# Patient Record
Sex: FEMALE | Race: WHITE | NOT HISPANIC OR LATINO | Employment: PART TIME | URBAN - METROPOLITAN AREA
[De-identification: names, ages, dates, MRNs, and addresses within clinical notes are randomized per-mention and may not be internally consistent; named-entity substitution may affect disease eponyms.]

---
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
## 2020-11-09 ENCOUNTER — HOSPITAL ENCOUNTER (EMERGENCY)
Facility: CLINIC | Age: 20
Discharge: HOME | End: 2020-11-09
Attending: EMERGENCY MEDICINE | Admitting: EMERGENCY MEDICINE
Payer: COMMERCIAL

## 2020-11-09 VITALS
TEMPERATURE: 98.9 F | OXYGEN SATURATION: 97 % | HEART RATE: 106 BPM | DIASTOLIC BLOOD PRESSURE: 85 MMHG | RESPIRATION RATE: 16 BRPM | BODY MASS INDEX: 39.64 KG/M2 | SYSTOLIC BLOOD PRESSURE: 124 MMHG | WEIGHT: 255 LBS

## 2020-11-09 DIAGNOSIS — S81.011A LACERATION OF RIGHT KNEE, INITIAL ENCOUNTER: ICD-10-CM

## 2020-11-09 DIAGNOSIS — Z23 REQUIRES A BOOSTER TETANUS: ICD-10-CM

## 2020-11-09 PROCEDURE — 90471 IMMUNIZATION ADMIN: CPT | Performed by: EMERGENCY MEDICINE

## 2020-11-09 PROCEDURE — 99282 EMERGENCY DEPT VISIT SF MDM: CPT | Mod: 25 | Performed by: EMERGENCY MEDICINE

## 2020-11-09 PROCEDURE — 99282 EMERGENCY DEPT VISIT SF MDM: CPT | Performed by: EMERGENCY MEDICINE

## 2020-11-09 PROCEDURE — 250N000011 HC RX IP 250 OP 636: Performed by: EMERGENCY MEDICINE

## 2020-11-09 PROCEDURE — 90715 TDAP VACCINE 7 YRS/> IM: CPT | Performed by: EMERGENCY MEDICINE

## 2020-11-09 RX ADMIN — CLOSTRIDIUM TETANI TOXOID ANTIGEN (FORMALDEHYDE INACTIVATED), CORYNEBACTERIUM DIPHTHERIAE TOXOID ANTIGEN (FORMALDEHYDE INACTIVATED), BORDETELLA PERTUSSIS TOXOID ANTIGEN (GLUTARALDEHYDE INACTIVATED), BORDETELLA PERTUSSIS FILAMENTOUS HEMAGGLUTININ ANTIGEN (FORMALDEHYDE INACTIVATED), BORDETELLA PERTUSSIS PERTACTIN ANTIGEN, AND BORDETELLA PERTUSSIS FIMBRIAE 2/3 ANTIGEN 0.5 ML: 5; 2; 2.5; 5; 3; 5 INJECTION, SUSPENSION INTRAMUSCULAR at 02:02

## 2020-11-09 ASSESSMENT — ENCOUNTER SYMPTOMS
WEAKNESS: 0
NUMBNESS: 0
COUGH: 0
FEVER: 0
NERVOUS/ANXIOUS: 1
WOUND: 1
SHORTNESS OF BREATH: 0

## 2020-11-09 NOTE — ED PROVIDER NOTES
History     Chief Complaint   Patient presents with     Laceration     fell on rocks- lac to right knee     HPI  Samantha Cody is a 19 year old female with history of obesity presenting for evaluation of injury to her right knee.  Patient reports she is slipped on some rocks for 3 hours before ED arrival landing on her knee.  She reports severe heavy bleeding from the wound.  Bleeding initially did stop but returned again so she came in for evaluation because she was not certain what to do next.  No significant pain at the time of the ED visit.  Patient uncertain when her last tetanus shot was.  Otherwise feeling well recently.    ==================================================================    CHART REVIEW:      Td/Tdap  12/13/2007         END CHART REVIEW  ==================================================================      Allergies:  No Known Allergies    Problem List:    Patient Active Problem List    Diagnosis Date Noted     Thyroid function test abnormal 02/18/2015     Priority: Medium     Multiple joint pain 02/18/2015     Priority: Medium        Past Medical History:    History reviewed. No pertinent past medical history.    Past Surgical History:    History reviewed. No pertinent surgical history.    Family History:    Family History   Problem Relation Age of Onset     Thyroid Disease Paternal Grandmother      Gynecology Other         ovarian cancer age:25 -paternal aunt       Social History:  Marital Status:  Single [1]  Social History     Tobacco Use     Smoking status: Never Smoker     Smokeless tobacco: Never Used   Substance Use Topics     Alcohol use: Not Currently     Drug use: No        Medications:         doxycycline monohydrate (MONODOX) 100 MG capsule          Review of Systems   Constitutional: Negative for fever.   Respiratory: Negative for cough and shortness of breath.    Cardiovascular: Negative for chest pain.   Musculoskeletal:        Mild pain over laceration of right knee    Skin: Positive for wound (Laceration right knee).   Neurological: Negative for weakness and numbness.   Psychiatric/Behavioral: The patient is nervous/anxious.    All other systems reviewed and are negative.      Physical Exam   BP: 119/74  Temp: 98.9  F (37.2  C)  Resp: 16  Weight: 115.7 kg (255 lb)  SpO2: 99 %      Physical Exam  Vitals signs and nursing note reviewed.   Constitutional:       Appearance: She is obese. She is not ill-appearing or diaphoretic.   Cardiovascular:      Rate and Rhythm: Normal rate.   Pulmonary:      Effort: Pulmonary effort is normal.   Musculoskeletal:      Right knee: She exhibits laceration (1 cm laceration just below the right knee overlying the tibial tuberosity). She exhibits normal range of motion and no swelling.   Skin:     General: Skin is warm and dry.      Capillary Refill: Capillary refill takes less than 2 seconds.   Neurological:      Mental Status: She is alert and oriented to person, place, and time.   Psychiatric:      Comments: anxious           Right knee      Right knee    ED [facility]     -Laceration Repair    Date/Time: 11/9/2020 2:04 AM  Performed by: Thomas Jim MD  Authorized by: Thomas Jim MD       ANESTHESIA (see MAR for exact dosages):     Anesthesia method:  None  LACERATION DETAILS     Location:  Leg    Leg location:  R knee    Length (cm):  1    Depth (mm):  2    REPAIR TYPE:     Repair type:  Simple        TREATMENT:     Amount of cleaning:  Standard    Irrigation solution:  Tap water    SKIN REPAIR     Repair method:  Tissue adhesive and Steri-Strips    Number of Steri-Strips:  3    APPROXIMATION     Approximation:  Close    POST-PROCEDURE DETAILS     Dressing:  Open (no dressing)      PROCEDURE   Patient Tolerance:  Patient tolerated the procedure well with no immediate complications                       No results found for this or any previous visit (from the past 24  hour(s)).    Medications   Tdap (tetanus-diphtheria-acell pertussis) (ADACEL) injection 0.5 mL (has no administration in time range)       Assessments & Plan (with Medical Decision Making)  19-year-old female presented for evaluation of injury to her right knee.  Fell directly onto the knee and had trouble controlling bleeding so came in for evaluation.  Bleeding was stopped in the ED and wound cleaned and closed with Steri-Strips and tissue adhesive.  Tetanus updated.     I have reviewed the nursing notes.    I have reviewed the findings, diagnosis, plan and need for follow up with the patient.    New Prescriptions    No medications on file       Final diagnoses:   Laceration of right knee, initial encounter   Requires a booster tetanus - given today       11/9/2020   [facility] EMERGENCY DEPT     Jim, Thomas Quesada MD  11/09/20 [id number]

## 2020-11-09 NOTE — ED AVS SNAPSHOT
[facility] Emergency Dept  [address] [address]  Phone: [phone]  Fax: [phone]                                    Samantha Cody   MRN: [id number]    Department: [facility] Emergency Dept   Date of Visit: 11/9/2020           After Visit Summary Signature Page    I have received my discharge instructions, and my questions have been answered. I have discussed any challenges I see with this plan with the nurse or doctor.    ..........................................................................................................................................  Patient/Patient Representative Signature      ..........................................................................................................................................  Patient Representative Print Name and Relationship to Patient    ..................................................               ................................................  Date                                   Time    ..........................................................................................................................................  Reviewed by Signature/Title    ...................................................              ..............................................  Date                                               Time          22EPIC Rev 08/18

## 2021-11-02 ENCOUNTER — OFFICE VISIT (OUTPATIENT)
Dept: FAMILY MEDICINE | Facility: CLINIC | Age: 21
End: 2021-11-02
Payer: COMMERCIAL

## 2021-11-02 VITALS
BODY MASS INDEX: 43.47 KG/M2 | WEIGHT: 277 LBS | DIASTOLIC BLOOD PRESSURE: 80 MMHG | SYSTOLIC BLOOD PRESSURE: 126 MMHG | TEMPERATURE: 98.3 F | HEIGHT: 67 IN | HEART RATE: 80 BPM

## 2021-11-02 DIAGNOSIS — Z32.00 POSSIBLE PREGNANCY, NOT YET CONFIRMED: Primary | ICD-10-CM

## 2021-11-02 LAB
HCG SERPL QL: NEGATIVE
HCG UR QL: NEGATIVE

## 2021-11-02 PROCEDURE — 36415 COLL VENOUS BLD VENIPUNCTURE: CPT | Performed by: NURSE PRACTITIONER

## 2021-11-02 PROCEDURE — 99213 OFFICE O/P EST LOW 20 MIN: CPT | Performed by: NURSE PRACTITIONER

## 2021-11-02 PROCEDURE — 81025 URINE PREGNANCY TEST: CPT | Performed by: NURSE PRACTITIONER

## 2021-11-02 PROCEDURE — 84703 CHORIONIC GONADOTROPIN ASSAY: CPT | Performed by: NURSE PRACTITIONER

## 2021-11-02 ASSESSMENT — MIFFLIN-ST. JEOR: SCORE: 2063.05

## 2021-11-02 NOTE — PROGRESS NOTES
"  Assessment & Plan     (Z32.00) Possible pregnancy, not yet confirmed  (primary encounter diagnosis)  Comment: negative pregnancy test   Plan: HCG Qual, Urine (VWD0183), HCG qualitative,         Blood ([id number])           BMI:   Estimated body mass index is 43.06 kg/m  as calculated from the following:    Height as of this encounter: 1.708 m (5' 7.25\").    Weight as of this encounter: 125.6 kg (277 lb).   Weight management plan: Discussed healthy diet and exercise guidelines    See Patient Instructions    No follow-ups on file.    Chantale Marquez, APRN CNP  [facility]    Gemma Singh is a 20 year old who presents for the following health issues     HPI     Patient is here for a pregnancy confirmation. She had one positive pregnancy test at home. LMP 10/14/21. She states that her last period was very light.        Review of Systems   Constitutional, HEENT, cardiovascular, pulmonary, gi and gu systems are negative, except as otherwise noted.      Objective    /80 (BP Location: Right arm, Cuff Size: Adult Large)   Pulse 80   Temp 98.3  F (36.8  C) (Tympanic)   Ht 1.708 m (5' 7.25\")   Wt 125.6 kg (277 lb)   LMP 10/14/2021   BMI 43.06 kg/m    There is no height or weight on file to calculate BMI.  Physical Exam   GENERAL: healthy, alert and no distress  EYES: Eyes grossly normal to inspection, PERRL and conjunctivae and sclerae normal  HENT: ear canals and TM's normal, nose and mouth without ulcers or lesions  RESP: lungs clear to auscultation - no rales, rhonchi or wheezes  CV: regular rate and rhythm, normal S1 S2, no S3 or S4, no murmur, click or rub, no peripheral edema and peripheral pulses strong  ABDOMEN: soft, nontender, no hepatosplenomegaly, no masses and bowel sounds normal  MS: no gross musculoskeletal defects noted, no edema  SKIN: no suspicious lesions or rashes  NEURO: Normal strength and tone, mentation intact and speech normal  PSYCH: mentation appears normal, " affect normal/bright    Results for orders placed or performed in visit on 11/02/21   HCG Qual, Urine ([id number])     Status: Normal   Result Value Ref Range    hCG Urine Qualitative Negative Negative   HCG qualitative, Blood ([id number])     Status: Normal   Result Value Ref Range    hCG Serum Qualitative Negative Negative

## 2021-11-02 NOTE — RESULT ENCOUNTER NOTE
Please Notify Samantha  of test results pregnancy serum test was negative    Chantale Marquez CNP

## 2022-06-09 ENCOUNTER — OFFICE VISIT (OUTPATIENT)
Dept: FAMILY MEDICINE | Facility: CLINIC | Age: 22
End: 2022-06-09
Payer: COMMERCIAL

## 2022-06-09 VITALS
SYSTOLIC BLOOD PRESSURE: 128 MMHG | DIASTOLIC BLOOD PRESSURE: 89 MMHG | BODY MASS INDEX: 44.41 KG/M2 | HEART RATE: 98 BPM | OXYGEN SATURATION: 97 % | HEIGHT: 68 IN | WEIGHT: 293 LBS | TEMPERATURE: 97.4 F | RESPIRATION RATE: 16 BRPM

## 2022-06-09 DIAGNOSIS — Z86.39 HISTORY OF HYPOTHYROIDISM: ICD-10-CM

## 2022-06-09 DIAGNOSIS — E03.9 ACQUIRED HYPOTHYROIDISM: Primary | ICD-10-CM

## 2022-06-09 DIAGNOSIS — Z11.4 SCREENING FOR HIV (HUMAN IMMUNODEFICIENCY VIRUS): ICD-10-CM

## 2022-06-09 DIAGNOSIS — Z00.01 ENCOUNTER FOR ROUTINE ADULT PHYSICAL EXAM WITH ABNORMAL FINDINGS: Primary | ICD-10-CM

## 2022-06-09 DIAGNOSIS — Z12.4 CERVICAL CANCER SCREENING: ICD-10-CM

## 2022-06-09 DIAGNOSIS — Z13.220 SCREENING CHOLESTEROL LEVEL: ICD-10-CM

## 2022-06-09 DIAGNOSIS — E66.01 CLASS 3 SEVERE OBESITY DUE TO EXCESS CALORIES WITHOUT SERIOUS COMORBIDITY WITH BODY MASS INDEX (BMI) OF 40.0 TO 44.9 IN ADULT (H): ICD-10-CM

## 2022-06-09 DIAGNOSIS — Z23 NEED FOR HPV VACCINE: ICD-10-CM

## 2022-06-09 DIAGNOSIS — E66.813 CLASS 3 SEVERE OBESITY DUE TO EXCESS CALORIES WITHOUT SERIOUS COMORBIDITY WITH BODY MASS INDEX (BMI) OF 40.0 TO 44.9 IN ADULT (H): ICD-10-CM

## 2022-06-09 DIAGNOSIS — N63.20 LEFT BREAST MASS: ICD-10-CM

## 2022-06-09 DIAGNOSIS — Z11.59 NEED FOR HEPATITIS C SCREENING TEST: ICD-10-CM

## 2022-06-09 LAB
ANION GAP SERPL CALCULATED.3IONS-SCNC: 4 MMOL/L (ref 3–14)
BASOPHILS # BLD AUTO: 0 10E3/UL (ref 0–0.2)
BASOPHILS NFR BLD AUTO: 0 %
BUN SERPL-MCNC: 11 MG/DL (ref 7–30)
CALCIUM SERPL-MCNC: 8.7 MG/DL (ref 8.5–10.1)
CHLORIDE BLD-SCNC: 105 MMOL/L (ref 94–109)
CHOLEST SERPL-MCNC: 177 MG/DL
CO2 SERPL-SCNC: 28 MMOL/L (ref 20–32)
CREAT SERPL-MCNC: 0.79 MG/DL (ref 0.52–1.04)
EOSINOPHIL # BLD AUTO: 0.1 10E3/UL (ref 0–0.7)
EOSINOPHIL NFR BLD AUTO: 3 %
ERYTHROCYTE [DISTWIDTH] IN BLOOD BY AUTOMATED COUNT: 15.8 % (ref 10–15)
FASTING STATUS PATIENT QL REPORTED: YES
GFR SERPL CREATININE-BSD FRML MDRD: >90 ML/MIN/1.73M2
GLUCOSE BLD-MCNC: 111 MG/DL (ref 70–99)
HCT VFR BLD AUTO: 36.1 % (ref 35–47)
HDLC SERPL-MCNC: 42 MG/DL
HGB BLD-MCNC: 11.3 G/DL (ref 11.7–15.7)
IMM GRANULOCYTES # BLD: 0 10E3/UL
IMM GRANULOCYTES NFR BLD: 0 %
LDLC SERPL CALC-MCNC: 94 MG/DL
LYMPHOCYTES # BLD AUTO: 1.7 10E3/UL (ref 0.8–5.3)
LYMPHOCYTES NFR BLD AUTO: 33 %
MCH RBC QN AUTO: 25.2 PG (ref 26.5–33)
MCHC RBC AUTO-ENTMCNC: 31.3 G/DL (ref 31.5–36.5)
MCV RBC AUTO: 81 FL (ref 78–100)
MONOCYTES # BLD AUTO: 0.4 10E3/UL (ref 0–1.3)
MONOCYTES NFR BLD AUTO: 7 %
NEUTROPHILS # BLD AUTO: 2.9 10E3/UL (ref 1.6–8.3)
NEUTROPHILS NFR BLD AUTO: 56 %
NONHDLC SERPL-MCNC: 135 MG/DL
PLATELET # BLD AUTO: 200 10E3/UL (ref 150–450)
POTASSIUM BLD-SCNC: 3.7 MMOL/L (ref 3.4–5.3)
RBC # BLD AUTO: 4.48 10E6/UL (ref 3.8–5.2)
SODIUM SERPL-SCNC: 137 MMOL/L (ref 133–144)
T4 FREE SERPL-MCNC: 0.93 NG/DL (ref 0.76–1.46)
TRIGL SERPL-MCNC: 203 MG/DL
TSH SERPL DL<=0.005 MIU/L-ACNC: 9.55 MU/L (ref 0.4–4)
WBC # BLD AUTO: 5.1 10E3/UL (ref 4–11)

## 2022-06-09 PROCEDURE — 80048 BASIC METABOLIC PNL TOTAL CA: CPT | Performed by: NURSE PRACTITIONER

## 2022-06-09 PROCEDURE — 80061 LIPID PANEL: CPT | Performed by: NURSE PRACTITIONER

## 2022-06-09 PROCEDURE — 99214 OFFICE O/P EST MOD 30 MIN: CPT | Mod: 25 | Performed by: NURSE PRACTITIONER

## 2022-06-09 PROCEDURE — 84443 ASSAY THYROID STIM HORMONE: CPT | Performed by: NURSE PRACTITIONER

## 2022-06-09 PROCEDURE — 36415 COLL VENOUS BLD VENIPUNCTURE: CPT | Performed by: NURSE PRACTITIONER

## 2022-06-09 PROCEDURE — 85025 COMPLETE CBC W/AUTO DIFF WBC: CPT | Performed by: NURSE PRACTITIONER

## 2022-06-09 PROCEDURE — 84439 ASSAY OF FREE THYROXINE: CPT | Performed by: NURSE PRACTITIONER

## 2022-06-09 PROCEDURE — 90471 IMMUNIZATION ADMIN: CPT | Performed by: NURSE PRACTITIONER

## 2022-06-09 PROCEDURE — 99395 PREV VISIT EST AGE 18-39: CPT | Mod: 25 | Performed by: NURSE PRACTITIONER

## 2022-06-09 PROCEDURE — 90651 9VHPV VACCINE 2/3 DOSE IM: CPT | Performed by: NURSE PRACTITIONER

## 2022-06-09 RX ORDER — LEVOTHYROXINE SODIUM 50 UG/1
50 TABLET ORAL DAILY
Qty: 90 TABLET | Refills: 0 | Status: SHIPPED | OUTPATIENT
Start: 2022-06-09 | End: 2022-08-25

## 2022-06-09 ASSESSMENT — ENCOUNTER SYMPTOMS
HEMATURIA: 0
CHILLS: 0
DIZZINESS: 1
SORE THROAT: 0
EYE PAIN: 0
FREQUENCY: 0
PARESTHESIAS: 0
COUGH: 0
DIARRHEA: 0
DYSURIA: 0
HEADACHES: 1
MYALGIAS: 0
ABDOMINAL PAIN: 0
PALPITATIONS: 0
FEVER: 0
JOINT SWELLING: 0
NERVOUS/ANXIOUS: 0
ARTHRALGIAS: 0
WEAKNESS: 0
HEARTBURN: 0
HEMATOCHEZIA: 0
BREAST MASS: 1
CONSTIPATION: 0
SHORTNESS OF BREATH: 1
NAUSEA: 1

## 2022-06-09 ASSESSMENT — PAIN SCALES - GENERAL: PAINLEVEL: NO PAIN (0)

## 2022-06-09 NOTE — PROGRESS NOTES
"ph   SUBJECTIVE:   CC: Samantha Cody is an 21 year old woman who presents for preventive health visit.       Patient has been advised of split billing requirements and indicates understanding: Yes  Healthy Habits:     Getting at least 3 servings of Calcium per day:  NO    Bi-annual eye exam:  NO    Dental care twice a year:  NO    Sleep apnea or symptoms of sleep apnea:  None    Diet:  Breakfast skipped    Frequency of exercise:  None    Taking medications regularly:  Yes    Medication side effects:  None    PHQ-2 Total Score: 0    Additional concerns today:  No      Left breast lump x 3 months - BF noticed 12 oclock 1 inch from nipple  Patient's last menstrual period was 06/06/2022.      HPV vaccine today  On period first Pap declined today will come back for same.   Same partner for > 1 yr  No vaginal symptoms.   Declined hep c and HIV screening    Estimated body mass index is 44.85 kg/m  as calculated from the following:    Height as of this encounter: 1.727 m (5' 8\").    Weight as of this encounter: 133.8 kg (295 lb).      Today's PHQ-2 Score:   PHQ-2 ( 1999 [employer]) 6/9/2022   Q1: Little interest or pleasure in doing things 0   Q2: Feeling down, depressed or hopeless 0   PHQ-2 Score 0   Q1: Little interest or pleasure in doing things Not at all   Q2: Feeling down, depressed or hopeless Not at all   PHQ-2 Score 0       Abuse: Current or Past (Physical, Sexual or Emotional) - No  Do you feel safe in your environment? Yes    Have you ever done Advance Care Planning? (For example, a Health Directive, POLST, or a discussion with a medical provider or your loved ones about your wishes): No, advance care planning information given to patient to review.  Advanced care planning was discussed at today's visit.    Social History     Tobacco Use     Smoking status: Never Smoker     Smokeless tobacco: Never Used   Substance Use Topics     Alcohol use: Not Currently         Alcohol Use 6/9/2022   Prescreen: >3 drinks/day " or >7 drinks/week? Yes   AUDIT SCORE  10   Patient's last menstrual period was 06/06/2022.      Reviewed orders with patient.  Reviewed health maintenance and updated orders accordingly - Yes  BP Readings from Last 3 Encounters:   06/09/22 128/89   11/02/21 126/80   11/09/20 124/85    Wt Readings from Last 3 Encounters:   06/09/22 133.8 kg (295 lb)   11/02/21 125.6 kg (277 lb)   11/09/20 115.7 kg (255 lb) (>99 %, Z= 2.56)*     * Growth percentiles are based on [facility] (Girls, 2-20 Years) data.                  Patient Active Problem List   Diagnosis     Thyroid function test abnormal     Multiple joint pain     Morbid obesity (H)     History reviewed. No pertinent surgical history.    Social History     Tobacco Use     Smoking status: Never Smoker     Smokeless tobacco: Never Used   Substance Use Topics     Alcohol use: Not Currently     Family History   Problem Relation Age of Onset     Thyroid Disease Paternal Grandmother      Gynecology Other         ovarian cancer age:25 -paternal aunt         No current outpatient medications on file.     No Known Allergies    Breast Cancer Screening:        History of abnormal Pap smear: NA never had done. Needs pap. If normal pap - age 21-29 PAP every 3 years recommended     Reviewed and updated as needed this visit by clinical staff   Tobacco  Allergies  Meds   Med Hx  Surg Hx  Fam Hx  Soc Hx          Reviewed and updated as needed this visit by Provider                   History reviewed. No pertinent past medical history.   History reviewed. No pertinent surgical history.  OB History   No obstetric history on file.       Review of Systems   Constitutional: Negative for chills and fever.   HENT: Positive for ear pain. Negative for congestion, hearing loss and sore throat.    Eyes: Negative for pain and visual disturbance.   Respiratory: Positive for shortness of breath. Negative for cough.    Cardiovascular: Negative for chest pain, palpitations and peripheral edema.  "  Gastrointestinal: Positive for nausea. Negative for abdominal pain, constipation, diarrhea, heartburn and hematochezia.   Breasts:  Positive for tenderness and breast mass. Negative for discharge.   Genitourinary: Positive for vaginal discharge. Negative for dysuria, frequency, genital sores, hematuria, pelvic pain, urgency and vaginal bleeding.   Musculoskeletal: Negative for arthralgias, joint swelling and myalgias.   Skin: Negative for rash.   Neurological: Positive for dizziness and headaches. Negative for weakness and paresthesias.   Psychiatric/Behavioral: Negative for mood changes. The patient is not nervous/anxious.           OBJECTIVE:   /89 (BP Location: Right arm)   Pulse 98   Temp 97.4  F (36.3  C) (Tympanic)   Resp 16   Ht 1.727 m (5' 8\")   Wt 133.8 kg (295 lb)   LMP 06/06/2022   SpO2 97%   BMI 44.85 kg/m    Physical Exam  GENERAL: healthy, alert and no distress  EYES: Eyes grossly normal to inspection, PERRL and conjunctivae and sclerae normal  HENT: ear canals and TM's normal, nose and mouth without ulcers or lesions  NECK: no adenopathy, no asymmetry, masses, or scars and thyroid normal to palpation  RESP: lungs clear to auscultation - no rales, rhonchi or wheezes  BREAST: pea size lump left breast 12 oclock non tender. Breast otherwise unremarkable.  CV: regular rate and rhythm, normal S1 S2, no S3 or S4, no murmur, click or rub, no peripheral edema and peripheral pulses strong  ABDOMEN: soft, nontender, no hepatosplenomegaly, no masses and bowel sounds normal  MS: no gross musculoskeletal defects noted, no edema  SKIN: no suspicious lesions or rashes  NEURO: Normal strength and tone, mentation intact and speech normal  PSYCH: mentation appears normal, affect normal/bright  LYMPH: no cervical, supraclavicular, axillary, or inguinal adenopathy    Diagnostic Test Results:  Labs reviewed in Epic  Results for orders placed or performed in visit on 06/09/22   Basic metabolic panel  (Ca, " Cl, CO2, Creat, Gluc, K, Na, BUN)     Status: None (Preliminary result)   Result Value Ref Range    Sodium 137 133 - 144 mmol/L    Potassium 3.7 3.4 - 5.3 mmol/L    Chloride 105 94 - 109 mmol/L    Carbon Dioxide (CO2)      Anion Gap      Urea Nitrogen      Creatinine      Calcium      Glucose      GFR Estimate     CBC with platelets and differential     Status: Abnormal   Result Value Ref Range    WBC Count 5.1 4.0 - 11.0 10e3/uL    RBC Count 4.48 3.80 - 5.20 10e6/uL    Hemoglobin 11.3 (L) 11.7 - 15.7 g/dL    Hematocrit 36.1 35.0 - 47.0 %    MCV 81 78 - 100 fL    MCH 25.2 (L) 26.5 - 33.0 pg    MCHC 31.3 (L) 31.5 - 36.5 g/dL    RDW 15.8 (H) 10.0 - 15.0 %    Platelet Count 200 150 - 450 10e3/uL    % Neutrophils 56 %    % Lymphocytes 33 %    % Monocytes 7 %    % Eosinophils 3 %    % Basophils 0 %    % Immature Granulocytes 0 %    Absolute Neutrophils 2.9 1.6 - 8.3 10e3/uL    Absolute Lymphocytes 1.7 0.8 - 5.3 10e3/uL    Absolute Monocytes 0.4 0.0 - 1.3 10e3/uL    Absolute Eosinophils 0.1 0.0 - 0.7 10e3/uL    Absolute Basophils 0.0 0.0 - 0.2 10e3/uL    Absolute Immature Granulocytes 0.0 <=0.4 10e3/uL   CBC with platelets and differential     Status: Abnormal    Narrative    The following orders were created for panel order CBC with platelets and differential.  Procedure                               Abnormality         Status                     ---------                               -----------         ------                     CBC with platelets and d...[[id number]]  Abnormal            Final result                 Please view results for these tests on the individual orders.       ASSESSMENT/PLAN:   Samantha was seen today for physical.    Diagnoses and all orders for this visit:    Encounter for routine adult physical exam with abnormal findings  -     REVIEW OF HEALTH MAINTENANCE PROTOCOL ORDERS    Left breast mass  Diagnostic mammogram and ultrasound ordered    -     MA Diagnostic Digital Bilateral; Future    Class  "3 severe obesity due to excess calories without serious comorbidity with body mass index (BMI) of 40.0 to 44.9 in adult (H)  Increase physical activity.  Improve nutrition.  Labs done today for monitoring    -     CBC with platelets and differential  -     Basic metabolic panel  (Ca, Cl, CO2, Creat, Gluc, K, Na, BUN)    Cervical cancer screening  Declined today.  Will make follow-up appointment for first When not on her menstrual cycle    History of hypothyroidism  Previous history of hypothyroidism.  Previous use of medications levothyroxine.  Off meds for several years.  Significant weight gain.  Fatigue.  Constipation.  Dry skin  Repeat labs    -     TSH with free T4 reflex    Need for HPV vaccine  -     Human Papilloma Virus Vaccine (Gardasil 9) 3 Dose IM    Screening for HIV (human immunodeficiency virus)  Declined    Need for hepatitis C screening test  Declined    Screening cholesterol level    -     Lipid panel reflex to direct LDL Fasting        Patient has been advised of split billing requirements and indicates understanding: Yes    COUNSELING:  Reviewed preventive health counseling, as reflected in patient instructions    Estimated body mass index is 44.85 kg/m  as calculated from the following:    Height as of this encounter: 1.727 m (5' 8\").    Weight as of this encounter: 133.8 kg (295 lb).    Weight management plan: Discussed healthy diet and exercise guidelines    She reports that she has never smoked. She has never used smokeless tobacco.      Call or return to the clinic with any worsening of symptoms or no resolution. Patient/Parent verbalized understanding and is in agreement. Medication side effects reviewed.   No current outpatient medications on file.     Chart documentation with Dragon Voice recognition Software. Although reviewed after completion, some words and grammatical errors may remain.      RAFY [facility]  "

## 2022-07-07 ENCOUNTER — HOSPITAL ENCOUNTER (OUTPATIENT)
Dept: ULTRASOUND IMAGING | Facility: CLINIC | Age: 22
Discharge: HOME | End: 2022-07-07
Attending: NURSE PRACTITIONER
Payer: COMMERCIAL

## 2022-07-07 DIAGNOSIS — N63.20 LEFT BREAST MASS: ICD-10-CM

## 2022-07-07 PROCEDURE — 76642 ULTRASOUND BREAST LIMITED: CPT | Mod: LT

## 2022-07-12 DIAGNOSIS — N63.0 BREAST MASS IN FEMALE: Primary | ICD-10-CM

## 2022-07-13 ENCOUNTER — PATIENT OUTREACH (OUTPATIENT)
Dept: ONCOLOGY | Facility: CLINIC | Age: 22
End: 2022-07-13

## 2022-07-13 NOTE — PROGRESS NOTES
"Referral reviewed and sent to [employer]PS (new patient scheduling) for scheduling:  \"SCHEDULE - TRELL LEWIS at , 60 min in person, with imaging to follow.\"    "

## 2022-07-29 ENCOUNTER — TELEPHONE (OUTPATIENT)
Dept: FAMILY MEDICINE | Facility: CLINIC | Age: 22
End: 2022-07-29

## 2022-07-29 NOTE — TELEPHONE ENCOUNTER
Patient Quality Outreach    Patient is due for the following:   Cervical Cancer Screening - PAP Needed    NEXT STEPS:   Schedule a office visit for PAP    Type of outreach:    Sent [employer] message.      Questions for provider review:    None     Bailee Kahler

## 2022-07-29 NOTE — LETTER
July 29, 2022      Samantha Cody  [address]  [place] [address]      Your healthcare team cares about your health. To provide you with the best care, we have reviewed your chart and based on our findings, we see that you are due to:     - CERVICAL CANCER SCREENING: Schedule a Cervical Cancer Screening, with Pap and wellness exam.     If you have already completed these items, please contact the clinic via phone or Mychart so your care team can review and update your records.  Thank you for choosing [facility] for your healthcare needs. For any questions, concerns, or to schedule an appointment please contact the clinic.       Healthy Regards,      Your [facility] Care Team

## 2022-08-11 ENCOUNTER — PRENATAL OFFICE VISIT (OUTPATIENT)
Dept: OBGYN | Facility: CLINIC | Age: 22
End: 2022-08-11
Payer: COMMERCIAL

## 2022-08-11 DIAGNOSIS — Z34.00 PRENATAL CARE, FIRST PREGNANCY: ICD-10-CM

## 2022-08-11 PROCEDURE — 99207 PR NO CHARGE NURSE ONLY: CPT | Performed by: OBSTETRICS & GYNECOLOGY

## 2022-08-11 RX ORDER — PRENATAL VIT/IRON FUM/FOLIC AC 27MG-0.8MG
1 TABLET ORAL DAILY
COMMUNITY
Start: 2022-08-03 | End: 2023-04-12

## 2022-08-11 NOTE — PROGRESS NOTES
Lifestyle and nutrition teaching completed   Marleny Thompson OB Intake Nurse    Patient supplied answers from flow sheet for:  Prenatal OB Questionnaire.

## 2022-08-24 LAB
ABO/RH(D): NORMAL
ANTIBODY SCREEN: NEGATIVE
SPECIMEN EXPIRATION DATE: NORMAL

## 2022-08-25 ENCOUNTER — PRENATAL OFFICE VISIT (OUTPATIENT)
Dept: OBGYN | Facility: CLINIC | Age: 22
End: 2022-08-25
Payer: COMMERCIAL

## 2022-08-25 VITALS
RESPIRATION RATE: 18 BRPM | TEMPERATURE: 98.9 F | SYSTOLIC BLOOD PRESSURE: 134 MMHG | WEIGHT: 293 LBS | HEIGHT: 67 IN | BODY MASS INDEX: 45.99 KG/M2 | HEART RATE: 85 BPM | DIASTOLIC BLOOD PRESSURE: 90 MMHG

## 2022-08-25 DIAGNOSIS — Z34.01 ENCOUNTER FOR PRENATAL CARE IN FIRST TRIMESTER OF FIRST PREGNANCY: Primary | ICD-10-CM

## 2022-08-25 DIAGNOSIS — E03.9 ACQUIRED HYPOTHYROIDISM: ICD-10-CM

## 2022-08-25 LAB
ALBUMIN UR-MCNC: NEGATIVE MG/DL
APPEARANCE UR: CLEAR
BACTERIA #/AREA URNS HPF: ABNORMAL /HPF
BILIRUB UR QL STRIP: NEGATIVE
COLOR UR AUTO: YELLOW
ERYTHROCYTE [DISTWIDTH] IN BLOOD BY AUTOMATED COUNT: 15.1 % (ref 10–15)
GLUCOSE 1H P 50 G GLC PO SERPL-MCNC: 92 MG/DL (ref 70–129)
GLUCOSE UR STRIP-MCNC: NEGATIVE MG/DL
HCT VFR BLD AUTO: 37.4 % (ref 35–47)
HGB BLD-MCNC: 11.6 G/DL (ref 11.7–15.7)
HGB UR QL STRIP: NEGATIVE
KETONES UR STRIP-MCNC: NEGATIVE MG/DL
LEUKOCYTE ESTERASE UR QL STRIP: ABNORMAL
MCH RBC QN AUTO: 25.3 PG (ref 26.5–33)
MCHC RBC AUTO-ENTMCNC: 31 G/DL (ref 31.5–36.5)
MCV RBC AUTO: 82 FL (ref 78–100)
NITRATE UR QL: NEGATIVE
PH UR STRIP: 6 [PH] (ref 5–7)
PLATELET # BLD AUTO: 228 10E3/UL (ref 150–450)
RBC # BLD AUTO: 4.58 10E6/UL (ref 3.8–5.2)
RBC #/AREA URNS AUTO: ABNORMAL /HPF
SP GR UR STRIP: <=1.005 (ref 1–1.03)
SQUAMOUS #/AREA URNS AUTO: ABNORMAL /LPF
TSH SERPL DL<=0.005 MIU/L-ACNC: 3.58 MU/L (ref 0.4–4)
UROBILINOGEN UR STRIP-ACNC: 0.2 E.U./DL
WBC # BLD AUTO: 5.8 10E3/UL (ref 4–11)
WBC #/AREA URNS AUTO: ABNORMAL /HPF

## 2022-08-25 PROCEDURE — 85027 COMPLETE CBC AUTOMATED: CPT | Performed by: OBSTETRICS & GYNECOLOGY

## 2022-08-25 PROCEDURE — 86800 THYROGLOBULIN ANTIBODY: CPT | Performed by: NURSE PRACTITIONER

## 2022-08-25 PROCEDURE — 86762 RUBELLA ANTIBODY: CPT | Performed by: OBSTETRICS & GYNECOLOGY

## 2022-08-25 PROCEDURE — 86803 HEPATITIS C AB TEST: CPT | Performed by: OBSTETRICS & GYNECOLOGY

## 2022-08-25 PROCEDURE — 86901 BLOOD TYPING SEROLOGIC RH(D): CPT | Performed by: OBSTETRICS & GYNECOLOGY

## 2022-08-25 PROCEDURE — 84443 ASSAY THYROID STIM HORMONE: CPT | Performed by: OBSTETRICS & GYNECOLOGY

## 2022-08-25 PROCEDURE — 86780 TREPONEMA PALLIDUM: CPT | Performed by: OBSTETRICS & GYNECOLOGY

## 2022-08-25 PROCEDURE — 82950 GLUCOSE TEST: CPT | Performed by: OBSTETRICS & GYNECOLOGY

## 2022-08-25 PROCEDURE — 36415 COLL VENOUS BLD VENIPUNCTURE: CPT | Performed by: NURSE PRACTITIONER

## 2022-08-25 PROCEDURE — 87340 HEPATITIS B SURFACE AG IA: CPT | Performed by: OBSTETRICS & GYNECOLOGY

## 2022-08-25 PROCEDURE — 99207 PR FIRST OB VISIT: CPT | Performed by: OBSTETRICS & GYNECOLOGY

## 2022-08-25 PROCEDURE — 81001 URINALYSIS AUTO W/SCOPE: CPT | Performed by: OBSTETRICS & GYNECOLOGY

## 2022-08-25 PROCEDURE — 87086 URINE CULTURE/COLONY COUNT: CPT | Performed by: OBSTETRICS & GYNECOLOGY

## 2022-08-25 PROCEDURE — 87591 N.GONORRHOEAE DNA AMP PROB: CPT | Performed by: OBSTETRICS & GYNECOLOGY

## 2022-08-25 PROCEDURE — G0145 SCR C/V CYTO,THINLAYER,RESCR: HCPCS | Performed by: OBSTETRICS & GYNECOLOGY

## 2022-08-25 PROCEDURE — 87389 HIV-1 AG W/HIV-1&-2 AB AG IA: CPT | Performed by: OBSTETRICS & GYNECOLOGY

## 2022-08-25 PROCEDURE — 86900 BLOOD TYPING SEROLOGIC ABO: CPT | Performed by: OBSTETRICS & GYNECOLOGY

## 2022-08-25 PROCEDURE — 86850 RBC ANTIBODY SCREEN: CPT | Performed by: OBSTETRICS & GYNECOLOGY

## 2022-08-25 PROCEDURE — 87491 CHLMYD TRACH DNA AMP PROBE: CPT | Performed by: OBSTETRICS & GYNECOLOGY

## 2022-08-25 PROCEDURE — 86376 MICROSOMAL ANTIBODY EACH: CPT | Performed by: NURSE PRACTITIONER

## 2022-08-25 RX ORDER — LEVOTHYROXINE SODIUM 75 UG/1
75 TABLET ORAL DAILY
Qty: 60 TABLET | Refills: 1 | Status: SHIPPED | OUTPATIENT
Start: 2022-08-25 | End: 2023-01-03

## 2022-08-25 NOTE — PROGRESS NOTES
"[facility]    New OB Visit Note    CC: New OB     Subjective:    Samantha is a 21 year old  at 11w3d by LMP of 2022.  She presents for her initial OB visit. This is a unplanned pregnancy and her and her partner Prashant  are excited. She reports feeling well. Denies any uterine cramping, abdominal pain or vaginal bleeding. Denies nausea and vomiting.       Pt reports that she feels safe at home and her mood is good.   Concerns: none    Past OB History: none       OB History    Para Term  AB Living   1 0 0 0 0 0   SAB IAB Ectopic Multiple Live Births   0 0 0 0 0      # Outcome Date GA Lbr Joshua/2nd Weight Sex Delivery Anes PTL Lv   1 Current                Past Medical History:   Diagnosis Date     Hypothyroidism        Past Surgical History:   Procedure Laterality Date     NO HISTORY OF SURGERY         Current Outpatient Medications   Medication Sig Dispense Refill     levothyroxine (SYNTHROID/LEVOTHROID) 50 MCG tablet Take 1 tablet (50 mcg) by mouth daily 90 tablet 0     Prenatal Vit-Fe Fumarate-FA (PRENATAL MULTIVITAMIN W/IRON) 27-0.8 MG tablet Take 1 tablet by mouth daily         Family History   Problem Relation Age of Onset     Hypertension Mother      Hypertension Father      Thyroid Disease Paternal Grandmother      Gynecology Other         ovarian cancer age:25 -paternal aunt       Social History     Tobacco Use     Smoking status: Current Every Day Smoker     Types: Vaping Device     Smokeless tobacco: Never Used     Tobacco comment: cutting down with pregnancy   Substance Use Topics     Alcohol use: Not Currently       ROS: A 10 pt ROS was completed and found to be negative unless mentioned in the HPI.     Objective:    BP (!) 134/90 (BP Location: Right arm, Patient Position: Chair, Cuff Size: Adult Large)   Pulse 85   Temp 98.9  F (37.2  C) (Tympanic)   Resp 18   Ht 1.702 m (5' 7\")   Wt 134.3 kg (296 lb)   LMP 2022   BMI 46.36 kg/m      Estimated " "body mass index is 46.36 kg/m  as calculated from the following:    Height as of this encounter: 1.702 m (5' 7\").    Weight as of this encounter: 134.3 kg (296 lb).    General appearance: well-hydrated, A&O x 3, no apparent distress  Lungs: Equal expansion bilaterally, no accessory muscle use  Heart: No heaves or thrills. No peripheral varicosities  Constitutional: See vitals  Abdomen: Soft, non-tender, non-distended. No rebound, rigidity, or guarding.  Extremities: no edema  Genitourinary:  External genitalia: no erythema, no lesions.   Urethral meatus appropriate location without lesions or prolapse  Urethra: No masses, tenderness, or scarring  Bladder no fullness, masses, or tenderness.  Anus and Perineum: Unremarkable, no visible lesions  Vagina: Normal, healthy pink mucosa without any lesions. Physiologic vaginal discharge.    Cervix: normal appearance, no cervical motion tenderness.   Uterus: gravid  Adnexa: no masses or tenderness bilaterally.    Bedside Ultrasound    Transvaginal ultrasound was performed. A viable intrauterine pregnancy was seen.      CRL= 1.17 cm   FHT= 148 bpm  EGA= 7 weeks 2 days  EDC based on US = 23  EDC by LMP= 3/13/23  FINAL EDC= 23            Assessment and Plan:     Encounter Diagnosis   Name Primary?     Encounter for prenatal care in first trimester of first pregnancy Yes       21 year old  at 7 weeks 2 days by ultrasound today who presents for her initial OB visit.     1) Plan to draw new OB lab today   2) Discussed routine prenatal care, group practice model at [facility], tertiary support and frequency of visits. Also discussed options for genetic screening tests. Patient desires NIPT for genetic testing.   3) Dating/viability US performed today.  4) Concerns: HTN and obesity  5) PMH/OBHx problems: hypothyroid - TSH drawn today and will check q trimester.  Recommend TSH around 1 during pregnancy.  6) Medication review: no changes, continue prenatal vitamin "   7) Reviewed miscarriage precautions (present to ED or call clinic with abdominal pain, vaginal bleeding or fever)   8) Weight gain: discussed weight gain expectations (BMI <18.5: 28-40lbs, BMI 18.5-25: 25-35lbs, BMI 25-30: 15-25lbs, BMI >30: 11-20lbs)   9) PAP smear: done otday  10) Delivery plan: continue to discuss route of delivery, breastfeeding, pp contraception, pain management in labor  11) Immunizations: Tdap at 28wks   12) POS increased risk for gestational diabetes, plan for screen at 28wks (The ADA and ACOG define patients at increased risk of type 2 diabetes based on: body mass index (BMI) ?25 kg/m2 (?23 kg/m2 in  Americans) plus one or more of the following [2,28]:   GDM in a previous pregnancy.   Glycated hemoglobin ?5.7 percent (39 mmol/mol), impaired glucose tolerance, or impaired fasting glucose on previous testing.   First-degree relative with diabetes.   High-risk race/ethnicity (eg, , , ,  American, ).   History of cardiovascular disease.   Hypertension (?140/90 mmHg) or on therapy for hypertension.   High-density lipoprotein cholesterol level <35 mg/dL (0.90 mmol/L) and/or a triglyceride level >250 mg/dL (2.82 mmol/L).   Polycystic ovary syndrome (PCOS).   Physical inactivity.   Other clinical condition associated with insulin resistance (eg, severe obesity, acanthosis nigricans).  -In addition, we would include older age and use age ?35 years as the threshold.  13) POS risk for preeclampsia.  Will start baby ASA between 12-16 weeks and stop between 36-delivery.(The incidence of preeclampsia is estimated to be at least 8 percent for pregnant women with any one of these high risk factors:  ?Previous pregnancy with preeclampsia, especially early onset and with an adverse outcome.  ?Type 1 or 2 diabetes mellitus.  ?Chronic hypertension.  ?Multifetal gestation.  ?Kidney disease.  ?Autoimmune disease with potential vascular  complications (antiphospholipid syndrome, systemic lupus erythematosus).     or two or more of the following moderate risk factors [22]:  ?Nulliparity.  ?Obesity (body mass index >30 kg/m2).  ?Family history of preeclampsia in mother or sister.  ?Age ?35 years.  ?Sociodemographic characteristics (Black persons, lower income level [recognizing that these are not biological factors]).  ?Personal risk factors (eg, previous pregnancy with low birth weight or small for gestational age infant, previous adverse pregnancy outcome [eg, stillbirth], interval >10 years between pregnancies).  ?In vitro conception    Return to clinic in 4 weeks or prn.

## 2022-08-26 LAB
BACTERIA UR CULT: NORMAL
C TRACH DNA SPEC QL PROBE+SIG AMP: NEGATIVE
HBV SURFACE AG SERPL QL IA: NONREACTIVE
HCV AB SERPL QL IA: NONREACTIVE
HIV 1+2 AB+HIV1 P24 AG SERPL QL IA: NONREACTIVE
N GONORRHOEA DNA SPEC QL NAA+PROBE: NEGATIVE
RUBV IGG SERPL QL IA: 3.26 INDEX
RUBV IGG SERPL QL IA: POSITIVE
T PALLIDUM AB SER QL: NONREACTIVE
THYROGLOB AB SERPL IA-ACNC: <20 IU/ML
THYROPEROXIDASE AB SERPL-ACNC: <10 IU/ML

## 2022-08-29 LAB
BKR LAB AP GYN ADEQUACY: NORMAL
BKR LAB AP GYN INTERPRETATION: NORMAL
BKR LAB AP HPV REFLEX: NO
BKR LAB AP PREVIOUS ABNORMAL: NORMAL
PATH REPORT.COMMENTS IMP SPEC: NORMAL
PATH REPORT.COMMENTS IMP SPEC: NORMAL
PATH REPORT.RELEVANT HX SPEC: NORMAL

## 2022-08-29 NOTE — RESULT ENCOUNTER NOTE
Robinson Singh,  I am reviewing test reports for Jane Julien today who is out of the office.     There were 2 tests to check for thyroid antibodies in your system that can cause low thyroid.  This is from a condition called Hashimoto's thyroiditis.  You did not have either of these antibodies at this time.  ELOISA POLLACK MD

## 2022-09-22 ENCOUNTER — PRENATAL OFFICE VISIT (OUTPATIENT)
Dept: OBGYN | Facility: CLINIC | Age: 22
End: 2022-09-22
Payer: COMMERCIAL

## 2022-09-22 VITALS
SYSTOLIC BLOOD PRESSURE: 127 MMHG | TEMPERATURE: 97.5 F | WEIGHT: 284 LBS | DIASTOLIC BLOOD PRESSURE: 80 MMHG | BODY MASS INDEX: 44.48 KG/M2 | HEART RATE: 93 BPM

## 2022-09-22 DIAGNOSIS — Z34.01 ENCOUNTER FOR PRENATAL CARE IN FIRST TRIMESTER OF FIRST PREGNANCY: Primary | ICD-10-CM

## 2022-09-22 PROCEDURE — 99207 PR PRENATAL VISIT: CPT | Performed by: OBSTETRICS & GYNECOLOGY

## 2022-09-22 PROCEDURE — 36415 COLL VENOUS BLD VENIPUNCTURE: CPT | Performed by: OBSTETRICS & GYNECOLOGY

## 2022-09-22 NOTE — NURSING NOTE
"Initial /80 (BP Location: Right arm, Patient Position: Chair, Cuff Size: Adult Large)   Pulse 93   Temp 97.5  F (36.4  C) (Tympanic)   Wt 128.8 kg (284 lb)   LMP 06/06/2022   Breastfeeding No   BMI 44.48 kg/m   Estimated body mass index is 44.48 kg/m  as calculated from the following:    Height as of 8/25/22: 1.702 m (5' 7\").    Weight as of this encounter: 128.8 kg (284 lb). .    Naima Gastelum MA      NIPT with Gender!  Naima Gastelum MA    "

## 2022-09-22 NOTE — PROGRESS NOTES
"[facility]   OB/GYN Clinic    CC: Return OB     Subjective:    Samantha is a 21 year old  at 11w2d who presents for return OB visit. She reports feeling well. Denies uterine cramping, vaginal bleeding or leaking, dysuria.     Objective:  /80 (BP Location: Right arm, Patient Position: Chair, Cuff Size: Adult Large)   Pulse 93   Temp 97.5  F (36.4  C) (Tympanic)   Wt 128.8 kg (284 lb)   LMP 2022   Breastfeeding No   BMI 44.48 kg/m      Estimated body mass index is 44.48 kg/m  as calculated from the following:    Height as of 22: 1.702 m (5' 7\").    Weight as of this encounter: 128.8 kg (284 lb).    Physical Exam:  Gen: Pleasant, talkative female in no apparent distress   Respiratory: breathing comfortably on room air   Cardiac: Warm and well-perfused.   GI: Abd soft and non-tender, gravid  MSK: Grossly normal movement of all four extremities  Psych: mood and affect bright   Lower extremity: edema not present     Fetal dop tones: 150s bpm    Assessment/Plan:   21 year old  at 11w2d who presents for follow-up OB visit.   1) New OB lab; T&S, CBC, HIV, RPR, HepBsAg, Hep B antibody, rubella, GC/Chlam WNL . Plan for 3rd tri lab at 28wks.   2) Genetics testing: NIPT today  3) anatomy scan at 20w  4 PMH/OBHx problems:    ?cHTN- elevated at new OB, will cont to monitor, rec ASA   BMI 46- early GCT WNL   Hypothyroid, WNL at new OB, recheck q trimester  5) Medication review: no changes, continue prenatal vitamin   6) Immunizations: flu shot and covid- declinesTdap at 28wks    Return to clinic in 4 weeks.     Cortney Fonseca MD   2022 1:59 PM     "

## 2022-10-05 LAB — SCANNED LAB RESULT: NORMAL

## 2022-10-19 ENCOUNTER — PRENATAL OFFICE VISIT (OUTPATIENT)
Dept: OBGYN | Facility: CLINIC | Age: 22
End: 2022-10-19
Payer: COMMERCIAL

## 2022-10-19 ENCOUNTER — TRANSCRIBE ORDERS (OUTPATIENT)
Dept: MATERNAL FETAL MEDICINE | Facility: HOSPITAL | Age: 22
End: 2022-10-19

## 2022-10-19 VITALS
BODY MASS INDEX: 41.98 KG/M2 | DIASTOLIC BLOOD PRESSURE: 77 MMHG | RESPIRATION RATE: 18 BRPM | TEMPERATURE: 97.7 F | SYSTOLIC BLOOD PRESSURE: 119 MMHG | HEART RATE: 99 BPM | HEIGHT: 68 IN | WEIGHT: 277 LBS

## 2022-10-19 DIAGNOSIS — E03.9 HYPOTHYROIDISM, UNSPECIFIED TYPE: ICD-10-CM

## 2022-10-19 DIAGNOSIS — Z34.02 ENCOUNTER FOR PRENATAL CARE IN SECOND TRIMESTER OF FIRST PREGNANCY: Primary | ICD-10-CM

## 2022-10-19 DIAGNOSIS — O26.90 PREGNANCY RELATED CONDITION, ANTEPARTUM: Primary | ICD-10-CM

## 2022-10-19 DIAGNOSIS — E66.01 CLASS 3 SEVERE OBESITY WITH SERIOUS COMORBIDITY AND BODY MASS INDEX (BMI) OF 40.0 TO 44.9 IN ADULT, UNSPECIFIED OBESITY TYPE (H): ICD-10-CM

## 2022-10-19 DIAGNOSIS — O10.919 CHRONIC HYPERTENSION AFFECTING PREGNANCY: ICD-10-CM

## 2022-10-19 DIAGNOSIS — E66.813 CLASS 3 SEVERE OBESITY WITH SERIOUS COMORBIDITY AND BODY MASS INDEX (BMI) OF 40.0 TO 44.9 IN ADULT, UNSPECIFIED OBESITY TYPE (H): ICD-10-CM

## 2022-10-19 LAB
ALBUMIN MFR UR ELPH: 8.4 MG/DL
ALT SERPL W P-5'-P-CCNC: 15 U/L (ref 10–35)
AST SERPL W P-5'-P-CCNC: 16 U/L (ref 10–35)
CREAT SERPL-MCNC: 0.57 MG/DL (ref 0.51–0.95)
CREAT UR-MCNC: 104.3 MG/DL
ERYTHROCYTE [DISTWIDTH] IN BLOOD BY AUTOMATED COUNT: 15.4 % (ref 10–15)
GFR SERPL CREATININE-BSD FRML MDRD: >90 ML/MIN/1.73M2
HCT VFR BLD AUTO: 35.7 % (ref 35–47)
HGB BLD-MCNC: 11.5 G/DL (ref 11.7–15.7)
MCH RBC QN AUTO: 25.3 PG (ref 26.5–33)
MCHC RBC AUTO-ENTMCNC: 32.2 G/DL (ref 31.5–36.5)
MCV RBC AUTO: 79 FL (ref 78–100)
PLATELET # BLD AUTO: 181 10E3/UL (ref 150–450)
PROT/CREAT 24H UR: 0.08 MG/MG CR (ref 0–0.2)
RBC # BLD AUTO: 4.54 10E6/UL (ref 3.8–5.2)
TSH SERPL DL<=0.005 MIU/L-ACNC: 3.08 UIU/ML (ref 0.3–4.2)
WBC # BLD AUTO: 7.4 10E3/UL (ref 4–11)

## 2022-10-19 PROCEDURE — 85027 COMPLETE CBC AUTOMATED: CPT | Performed by: STUDENT IN AN ORGANIZED HEALTH CARE EDUCATION/TRAINING PROGRAM

## 2022-10-19 PROCEDURE — 36415 COLL VENOUS BLD VENIPUNCTURE: CPT | Performed by: STUDENT IN AN ORGANIZED HEALTH CARE EDUCATION/TRAINING PROGRAM

## 2022-10-19 PROCEDURE — 84443 ASSAY THYROID STIM HORMONE: CPT | Performed by: STUDENT IN AN ORGANIZED HEALTH CARE EDUCATION/TRAINING PROGRAM

## 2022-10-19 PROCEDURE — 84460 ALANINE AMINO (ALT) (SGPT): CPT | Performed by: STUDENT IN AN ORGANIZED HEALTH CARE EDUCATION/TRAINING PROGRAM

## 2022-10-19 PROCEDURE — 99207 PR PRENATAL VISIT: CPT | Performed by: STUDENT IN AN ORGANIZED HEALTH CARE EDUCATION/TRAINING PROGRAM

## 2022-10-19 PROCEDURE — 84450 TRANSFERASE (AST) (SGOT): CPT | Performed by: STUDENT IN AN ORGANIZED HEALTH CARE EDUCATION/TRAINING PROGRAM

## 2022-10-19 PROCEDURE — 84156 ASSAY OF PROTEIN URINE: CPT | Performed by: STUDENT IN AN ORGANIZED HEALTH CARE EDUCATION/TRAINING PROGRAM

## 2022-10-19 PROCEDURE — 82565 ASSAY OF CREATININE: CPT | Performed by: STUDENT IN AN ORGANIZED HEALTH CARE EDUCATION/TRAINING PROGRAM

## 2022-10-19 NOTE — PROGRESS NOTES
"[facility] Clinic  Return OB Note    Subjective:  Denied cramping abdominal pain or vaginal bleeding.  Has not noted fetal movement yet.  No complaints today.    Objective:  /77 (BP Location: Left arm, Patient Position: Chair, Cuff Size: Adult Large)   Pulse 99   Temp 97.7  F (36.5  C) (Tympanic)   Resp 18   Ht 1.727 m (5' 8\")   Wt 125.6 kg (277 lb)   LMP 2022   BMI 42.12 kg/m      FHT: 130bpm by bedside ultrasound    Assessment/Plan:   Samantha Cody is a 21 year old  at 15w1d by 7w2d ultrasound, here for return OB visit.    Routine prenatal care:  - New OB labs on 2022: O+, antibody negative; Hgb 11.6, plt 228, RPR/HIV/HepB/HepC NR, Rubella immune, chlamydia/gonorrhea neg; PAP smear NILM  - Dating US on 2022 at 7w2d  - Anatomy US is ordered today  - Genetic screen: NIPT -low risk  - Immunizations: Declined influenza and COVID-vaccine.      Chronic hypertension  -After reviewing EMR, patient was found to have blood pressures of 134/84 on 2017 and 134/98 on 2022.  Patient likely have chronic hypertension diagnosis.  -baseline preeclampsia labs are ordered for today: EKG, AST, ALT, creatinine, hemoglobin, platelet, UPC.  -Low dose aspirin for preeclampsia prevention, ideally started between 12-16 weeks  -[facility] referral placed for comprehensive (level II) anatomic ultrasound and ultrasounds for growth monthly after comprehensive ultrasound  - fetal surveillance with weekly BPP starting at 32 weeks     Pregravida BMI 43.84  -First trimester GCT passed.  -Patient will be getting growth ultrasounds and  surveillance for chronic hypertension.    Hypothyroidism  -On levothyroxine 75 mcg daily, which was started during this pregnancy.  -TSH on 2022 was normal.  Repeat TSH is ordered for today.  -Maintain euthyroidism via treatment with levothyroxine, aimed at keeping the TSH at the desired range by trimester.   TSH should be checked q4-6 " weeks during pregnancy and adjusted to trimester specific norms.      First trimester: 0.1 to 2.5 mIU/L    Second trimester: 0.2 to 3 mIU/L    Third trimester: 0.3 to 3 mIU/L    RTC 4 weeks    Erica Maldonado MD  Office phone: [phone]  Obstetrics and Gynecology  [facility]   10/19/2022

## 2022-10-19 NOTE — NURSING NOTE
"Initial /77 (BP Location: Left arm, Patient Position: Chair, Cuff Size: Adult Large)   Pulse 99   Temp 97.7  F (36.5  C) (Tympanic)   Resp 18   Ht 1.727 m (5' 8\")   Wt 125.6 kg (277 lb)   LMP 06/06/2022   BMI 42.12 kg/m   Estimated body mass index is 42.12 kg/m  as calculated from the following:    Height as of this encounter: 1.727 m (5' 8\").    Weight as of this encounter: 125.6 kg (277 lb). .      "

## 2022-11-02 ENCOUNTER — PRE VISIT (OUTPATIENT)
Dept: MATERNAL FETAL MEDICINE | Facility: HOSPITAL | Age: 22
End: 2022-11-02

## 2022-11-09 ENCOUNTER — ANCILLARY PROCEDURE (OUTPATIENT)
Dept: ULTRASOUND IMAGING | Facility: HOSPITAL | Age: 22
End: 2022-11-09
Attending: STUDENT IN AN ORGANIZED HEALTH CARE EDUCATION/TRAINING PROGRAM
Payer: COMMERCIAL

## 2022-11-09 ENCOUNTER — OFFICE VISIT (OUTPATIENT)
Dept: MATERNAL FETAL MEDICINE | Facility: HOSPITAL | Age: 22
End: 2022-11-09
Attending: STUDENT IN AN ORGANIZED HEALTH CARE EDUCATION/TRAINING PROGRAM
Payer: COMMERCIAL

## 2022-11-09 DIAGNOSIS — O99.280 HYPOTHYROID IN PREGNANCY, ANTEPARTUM: ICD-10-CM

## 2022-11-09 DIAGNOSIS — O10.912 MATERNAL CHRONIC HYPERTENSION IN SECOND TRIMESTER: ICD-10-CM

## 2022-11-09 DIAGNOSIS — O99.212 MATERNAL OBESITY SYNDROME IN SECOND TRIMESTER: Primary | ICD-10-CM

## 2022-11-09 DIAGNOSIS — O26.90 PREGNANCY RELATED CONDITION, ANTEPARTUM: ICD-10-CM

## 2022-11-09 DIAGNOSIS — E03.9 HYPOTHYROID IN PREGNANCY, ANTEPARTUM: ICD-10-CM

## 2022-11-09 PROCEDURE — 99203 OFFICE O/P NEW LOW 30 MIN: CPT | Mod: 25 | Performed by: OBSTETRICS & GYNECOLOGY

## 2022-11-09 PROCEDURE — 76811 OB US DETAILED SNGL FETUS: CPT

## 2022-11-09 PROCEDURE — 76811 OB US DETAILED SNGL FETUS: CPT | Mod: 26 | Performed by: OBSTETRICS & GYNECOLOGY

## 2022-11-09 NOTE — PROGRESS NOTES
Samantha presents to [facility] clinic for ultrasound and recommendations due to maternal chronic hypertension and BMI > 40. Pregnancy is also complicated by hypothyroidism. Labs and prenatal records were reviewed.    Impression:  1) Castillo intrauterine pregnancy at 18w 1d gestational age.   2) None of the anomalies commonly detected by ultrasound were evident in the detailed fetal anatomic survey, however some anatomy was seen suboptimally as outlined above.   3) Growth parameters and estimated fetal weight were consistent with established dates.  4) The amniotic fluid volume appeared normal.  5) Normal fetal activity for gestational age.  6) On transabdominal imaging the cervix appears long and closed.    Recommendations  Thank-you for referring your patient for a comprehensive ultrasound.  She had cell-free DNA screening showing the expected amounts of chromosomes 21, 18 & 13.    I discussed the findings on today's ultrasound with the patient. Samantha was diagnosed with chronic hypertension at the beginning of pregnancy. She is not on any antihypertensives, however she is on low dose aspirin which is appropriate. HELLP labs were reviewed and they are within normal limits. Antihypertensives are indicated to keep blood pressures <140 SBP or <90 DBP. Labetalol and nifedipine are first line. Currently, Samantha's blood pressures are well controlled on no medications.    Serial ultrasounds to assess fetal growth are recommended following completion of the detailed anatomic survey starting at 28 weeks. I anticipate these will be done in your office, but can be done here if you prefer. Weekly  testing is recommended to begin at 34 weeks (due to maternal BMI > 40) unless antihypertensives are started, at which point I would begin  testing at 32 weeks. Delivery is recommended at 38-39 weeks.    Follow-up is scheduled here in 4 weeks to reassess anatomy that was suboptimally seen today.     Return to primary  provider for continued prenatal care.    If you have questions regarding today's evaluation or if we can be of further service, please contact the [facility].    **Fetal anomalies may be present but not detected**

## 2022-11-11 ENCOUNTER — PRENATAL OFFICE VISIT (OUTPATIENT)
Dept: OBGYN | Facility: CLINIC | Age: 22
End: 2022-11-11
Payer: COMMERCIAL

## 2022-11-11 VITALS
DIASTOLIC BLOOD PRESSURE: 71 MMHG | BODY MASS INDEX: 41.81 KG/M2 | WEIGHT: 275 LBS | SYSTOLIC BLOOD PRESSURE: 130 MMHG | HEART RATE: 81 BPM | TEMPERATURE: 97.8 F

## 2022-11-11 DIAGNOSIS — Z34.92 PRENATAL CARE, SECOND TRIMESTER: Primary | ICD-10-CM

## 2022-11-11 PROCEDURE — 99207 PR PRENATAL VISIT: CPT | Performed by: OBSTETRICS & GYNECOLOGY

## 2022-11-11 RX ORDER — ASPIRIN 81 MG/1
81 TABLET ORAL DAILY
Status: ON HOLD | COMMUNITY
End: 2023-04-08

## 2022-11-11 NOTE — NURSING NOTE
"Initial /71 (BP Location: Right arm, Patient Position: Chair, Cuff Size: Adult Large)   Pulse 81   Temp 97.8  F (36.6  C) (Tympanic)   Wt 124.7 kg (275 lb)   LMP 06/06/2022   BMI 41.81 kg/m   Estimated body mass index is 41.81 kg/m  as calculated from the following:    Height as of 10/19/22: 1.727 m (5' 8\").    Weight as of this encounter: 124.7 kg (275 lb). .    Naima Gastelum MA    "

## 2022-11-11 NOTE — PROGRESS NOTES
[facility] OB/[facility]N [facility]  Return OB Note     Subjective:  Denied cramping abdominal pain or vaginal bleeding.  +FM. Has had some sciatic back pain.      Objective:/71 (BP Location: Right arm, Patient Position: Chair, Cuff Size: Adult Large)   Pulse 81   Temp 97.8  F (36.6  C) (Tympanic)   Wt 124.7 kg (275 lb)   LMP 2022   BMI 41.81 kg/m        FHT: 150s     Assessment/Plan:   Samantha Cody is a 21 year old  at 18w3d by 7w2d ultrasound, here for return OB visit.     Routine prenatal care:  - New OB labs on 2022: O+, antibody negative; Hgb 11.6, plt 228, RPR/HIV/HepB/HepC NR, Rubella immune, chlamydia/gonorrhea neg; PAP smear NILM  - Dating US on 2022 at 7w2d  - Anatomy US WNL, repeat for missing views pending  - Genetic screen: NIPT -low risk  - Immunizations: Declined influenza and COVID-vaccine.       ?Chronic hypertension  -After reviewing EMR, patient was found to have blood pressures of 134/84 on 2017 and 134/98 on 2022.  Patient likely have chronic hypertension diagnosis.  -baseline preeclampsia labs are ordered for today: EKG, AST, ALT, creatinine, hemoglobin, platelet, UPC.  -Low dose aspirin for preeclampsia prevention, ideally started between 12-16 weeks  -s/p level 2  -Likely  fetal surveillance with weekly BPP starting at 32 weeks      Pregravida BMI 43.84  -First trimester GCT passed.  -Patient will be getting growth ultrasounds and  surveillance for chronic hypertension.     Hypothyroidism  -On levothyroxine 75 mcg daily, which was started during this pregnancy.  -TSH on 2022 was normal.  Repeat TSH with third tri labs  -Maintain euthyroidism via treatment with levothyroxine, aimed at keeping the TSH at the desired range by trimester.   TSH should be checked q4-6 weeks during pregnancy and adjusted to trimester specific norms.      First trimester: 0.1 to 2.5 mIU/L    Second trimester: 0.2 to 3 mIU/L    Third trimester: 0.3  to 3 mIU/L     RTC 4 weeks    Cortney Fonseca MD   11/11/2022 11:10 AM

## 2022-11-20 ENCOUNTER — HEALTH MAINTENANCE LETTER (OUTPATIENT)
Age: 22
End: 2022-11-20

## 2022-11-29 ENCOUNTER — OFFICE VISIT (OUTPATIENT)
Dept: FAMILY MEDICINE | Facility: CLINIC | Age: 22
End: 2022-11-29
Payer: COMMERCIAL

## 2022-11-29 VITALS
HEART RATE: 114 BPM | DIASTOLIC BLOOD PRESSURE: 60 MMHG | TEMPERATURE: 99.8 F | RESPIRATION RATE: 12 BRPM | OXYGEN SATURATION: 97 % | SYSTOLIC BLOOD PRESSURE: 110 MMHG | WEIGHT: 272 LBS | BODY MASS INDEX: 41.36 KG/M2

## 2022-11-29 DIAGNOSIS — E03.9 HYPOTHYROIDISM, UNSPECIFIED TYPE: ICD-10-CM

## 2022-11-29 DIAGNOSIS — O10.919 CHRONIC HYPERTENSION AFFECTING PREGNANCY: Primary | ICD-10-CM

## 2022-11-29 LAB
ERYTHROCYTE [DISTWIDTH] IN BLOOD BY AUTOMATED COUNT: 15.8 % (ref 10–15)
HCT VFR BLD AUTO: 32.6 % (ref 35–47)
HGB BLD-MCNC: 10.5 G/DL (ref 11.7–15.7)
MCH RBC QN AUTO: 25.9 PG (ref 26.5–33)
MCHC RBC AUTO-ENTMCNC: 32.2 G/DL (ref 31.5–36.5)
MCV RBC AUTO: 81 FL (ref 78–100)
PLATELET # BLD AUTO: 148 10E3/UL (ref 150–450)
RBC # BLD AUTO: 4.05 10E6/UL (ref 3.8–5.2)
TSH SERPL DL<=0.005 MIU/L-ACNC: 0.74 UIU/ML (ref 0.3–4.2)
WBC # BLD AUTO: 4.2 10E3/UL (ref 4–11)

## 2022-11-29 PROCEDURE — 99214 OFFICE O/P EST MOD 30 MIN: CPT | Performed by: FAMILY MEDICINE

## 2022-11-29 PROCEDURE — 84443 ASSAY THYROID STIM HORMONE: CPT | Performed by: FAMILY MEDICINE

## 2022-11-29 PROCEDURE — 85027 COMPLETE CBC AUTOMATED: CPT | Performed by: FAMILY MEDICINE

## 2022-11-29 PROCEDURE — 93000 ELECTROCARDIOGRAM COMPLETE: CPT | Performed by: FAMILY MEDICINE

## 2022-11-29 PROCEDURE — 36415 COLL VENOUS BLD VENIPUNCTURE: CPT | Performed by: FAMILY MEDICINE

## 2022-11-29 PROCEDURE — 84439 ASSAY OF FREE THYROXINE: CPT | Performed by: FAMILY MEDICINE

## 2022-11-29 ASSESSMENT — PAIN SCALES - GENERAL: PAINLEVEL: NO PAIN (0)

## 2022-11-29 NOTE — PROGRESS NOTES
"  Assessment & Plan     Chronic hypertension affecting pregnancy  EKG is normal   BP is normal   Tachy heart rate   Will get labs and recheck this after hydration   She did come back to the clinic the following day and had a normal heartrate   - EKG 12-lead complete with read (future)  - CBC with platelets; Future  - CBC with platelets    Hypothyroidism, unspecified type  Adjust meds as indicated by above labs.   - TSH with free T4 reflex; Future  - TSH with free T4 reflex  - T4, free; Future  - T4, free             Nicotine/Tobacco Cessation:  She reports that she has been smoking vaping device. She has never used smokeless tobacco.  Nicotine/Tobacco Cessation Plan:   Information offered: Patient not interested at this time      BMI:   Estimated body mass index is 41.36 kg/m  as calculated from the following:    Height as of 10/19/22: 1.727 m (5' 8\").    Weight as of this encounter: 123.4 kg (272 lb).       Patient Instructions   Hydrate at home     Check thyroid and hemoglobin today     Will let you know lab results and next steps      Return in about 2 weeks (around 12/13/2022).    Ivana West MD  [facility]    Gemma Singh is a 21 year old, presenting for the following health issues:  Heart Problem (Per OB/Gyn)      History of Present Illness       Reason for visit:  Ekg    She eats 0-1 servings of fruits and vegetables daily.She consumes 1 sweetened beverage(s) daily.She exercises with enough effort to increase her heart rate 30 to 60 minutes per day.  She exercises with enough effort to increase her heart rate 3 or less days per week.   She is taking medications regularly.     Pt is 22w5d and here for EKG  Had elevated BP at OB visit and work up for htn in preg started   Has had normal labs and has felt well   She is not checking BP at home but since her first elevated pressure she has had normal readings    No headaches no CP no abd pain and no swelling   Eating " well   She has not however today had anything to eat or drink prior to this visit just woke up efore coming here             Review of Systems   Constitutional, HEENT, cardiovascular, pulmonary, gi and gu systems are negative, except as otherwise noted.      Objective    /60   Pulse 114   Temp 99.8  F (37.7  C) (Tympanic)   Resp 12   Wt 123.4 kg (272 lb)   LMP 06/06/2022   SpO2 97%   BMI 41.36 kg/m    Body mass index is 41.36 kg/m .  Physical Exam   GENERAL APPEARANCE: healthy, alert and no distress  NECK: no adenopathy, no asymmetry, masses, or scars and thyroid normal to palpation  RESP: lungs clear to auscultation - no rales, rhonchi or wheezes  CV: regular  rhythm, normal S1 S2, no S3 or S4 and no murmur, click or rub tachy at 100s  MS: extremities normal- no gross deformities noted  PSYCH: mentation appears normal and affect normal/bright    EKG - Reviewed and interpreted by me appears normal, NSR, normal axis, normal intervals, no acute ST/T changes c/w ischemia, no LVH by voltage criteria, unchanged from previous tracings  Tachy

## 2022-11-30 LAB — T4 FREE SERPL-MCNC: 1.08 NG/DL (ref 0.9–1.7)

## 2022-12-02 ENCOUNTER — ALLIED HEALTH/NURSE VISIT (OUTPATIENT)
Dept: FAMILY MEDICINE | Facility: CLINIC | Age: 22
End: 2022-12-02
Payer: COMMERCIAL

## 2022-12-02 VITALS — SYSTOLIC BLOOD PRESSURE: 112 MMHG | HEART RATE: 88 BPM | DIASTOLIC BLOOD PRESSURE: 80 MMHG

## 2022-12-02 DIAGNOSIS — Z34.01 ENCOUNTER FOR PRENATAL CARE IN FIRST TRIMESTER OF FIRST PREGNANCY: Primary | ICD-10-CM

## 2022-12-02 LAB
ERYTHROCYTE [DISTWIDTH] IN BLOOD BY AUTOMATED COUNT: 15.9 % (ref 10–15)
HCT VFR BLD AUTO: 34.3 % (ref 35–47)
HGB BLD-MCNC: 11.2 G/DL (ref 11.7–15.7)
MCH RBC QN AUTO: 26.1 PG (ref 26.5–33)
MCHC RBC AUTO-ENTMCNC: 32.7 G/DL (ref 31.5–36.5)
MCV RBC AUTO: 80 FL (ref 78–100)
PLATELET # BLD AUTO: 164 10E3/UL (ref 150–450)
RBC # BLD AUTO: 4.29 10E6/UL (ref 3.8–5.2)
WBC # BLD AUTO: 5.6 10E3/UL (ref 4–11)

## 2022-12-02 PROCEDURE — 85027 COMPLETE CBC AUTOMATED: CPT

## 2022-12-02 PROCEDURE — 99207 PR NO CHARGE NURSE ONLY: CPT

## 2022-12-02 PROCEDURE — 36415 COLL VENOUS BLD VENIPUNCTURE: CPT

## 2022-12-02 NOTE — PROGRESS NOTES
Samantha Cody is a 21 year old year old patient who comes in today for a Blood Pressure check because of pulse rate was elevated at last office visit 11/29/22  BP today 112/80 P 88  Patient is taking medication as prescribed  Patient is tolerating medications well.  Patient is not monitoring Blood Pressure at home on a regular basis.Did check yesterday: 113/77 P 76    Patient states she is feeling pretty good  Disposition:  Sent to lab to have repeat CBC drawn.Dr West updated, patient to wait for results.  Linnette COLLAZO RN

## 2022-12-07 ENCOUNTER — ANCILLARY PROCEDURE (OUTPATIENT)
Dept: ULTRASOUND IMAGING | Facility: HOSPITAL | Age: 22
End: 2022-12-07
Attending: OBSTETRICS & GYNECOLOGY
Payer: COMMERCIAL

## 2022-12-07 ENCOUNTER — OFFICE VISIT (OUTPATIENT)
Dept: MATERNAL FETAL MEDICINE | Facility: HOSPITAL | Age: 22
End: 2022-12-07
Attending: OBSTETRICS & GYNECOLOGY
Payer: COMMERCIAL

## 2022-12-07 DIAGNOSIS — O99.212 MATERNAL OBESITY SYNDROME IN SECOND TRIMESTER: ICD-10-CM

## 2022-12-07 DIAGNOSIS — O10.912 MATERNAL CHRONIC HYPERTENSION IN SECOND TRIMESTER: ICD-10-CM

## 2022-12-07 DIAGNOSIS — O99.280 HYPOTHYROID IN PREGNANCY, ANTEPARTUM: ICD-10-CM

## 2022-12-07 DIAGNOSIS — O99.212 MATERNAL OBESITY SYNDROME IN SECOND TRIMESTER: Primary | ICD-10-CM

## 2022-12-07 DIAGNOSIS — E03.9 HYPOTHYROID IN PREGNANCY, ANTEPARTUM: ICD-10-CM

## 2022-12-07 PROCEDURE — 76816 OB US FOLLOW-UP PER FETUS: CPT

## 2022-12-07 PROCEDURE — 76816 OB US FOLLOW-UP PER FETUS: CPT | Mod: 26 | Performed by: OBSTETRICS & GYNECOLOGY

## 2022-12-07 PROCEDURE — 99207 PR NO CHARGE LOS: CPT | Performed by: OBSTETRICS & GYNECOLOGY

## 2022-12-07 NOTE — PROGRESS NOTES
Please see the imaging tab for details of the ultrasound performed today.    Milly Mcgee MD  Specialist in Maternal-Fetal Medicine

## 2022-12-09 ENCOUNTER — PRENATAL OFFICE VISIT (OUTPATIENT)
Dept: OBGYN | Facility: CLINIC | Age: 22
End: 2022-12-09
Payer: COMMERCIAL

## 2022-12-09 VITALS
DIASTOLIC BLOOD PRESSURE: 72 MMHG | WEIGHT: 270 LBS | HEART RATE: 96 BPM | RESPIRATION RATE: 18 BRPM | TEMPERATURE: 97.2 F | SYSTOLIC BLOOD PRESSURE: 117 MMHG | HEIGHT: 67 IN | BODY MASS INDEX: 42.38 KG/M2

## 2022-12-09 DIAGNOSIS — E03.9 HYPOTHYROIDISM, UNSPECIFIED TYPE: ICD-10-CM

## 2022-12-09 DIAGNOSIS — O09.92 HIGH-RISK PREGNANCY IN SECOND TRIMESTER: Primary | ICD-10-CM

## 2022-12-09 DIAGNOSIS — O99.212 OBESITY DURING PREGNANCY IN SECOND TRIMESTER: ICD-10-CM

## 2022-12-09 PROCEDURE — 99207 PR PRENATAL VISIT: CPT | Performed by: ADVANCED PRACTICE MIDWIFE

## 2022-12-09 NOTE — PROGRESS NOTES
"Feeling well.  Baby is active. Denies any leaking of fluid, vaginal bleeding, regular uterine contractions, or headaches or other concerns.  Reviewed wt.  She feels well and is eating \"whatever I want.\"  She thinks that she is losing weight due to restarting the thyroid medication.  Normal TSH and T4 on 11/29. Discussed GCT at next appt.    Reviewed to call for contractions, loss of fluid, vaginal bleeding, decreased fetal movement or any other questions or concerns.    RTC in 4 weeks.  Beckie Moralez, CA, APRN, CNM               "

## 2022-12-09 NOTE — NURSING NOTE
"Initial /72 (BP Location: Left arm, Patient Position: Chair, Cuff Size: Adult Large)   Pulse 96   Temp 97.2  F (36.2  C) (Tympanic)   Resp 18   Ht 1.702 m (5' 7\")   Wt 122.5 kg (270 lb)   LMP 06/06/2022   BMI 42.29 kg/m   Estimated body mass index is 42.29 kg/m  as calculated from the following:    Height as of this encounter: 1.702 m (5' 7\").    Weight as of this encounter: 122.5 kg (270 lb). .      "

## 2022-12-30 DIAGNOSIS — E03.9 ACQUIRED HYPOTHYROIDISM: ICD-10-CM

## 2023-01-03 RX ORDER — LEVOTHYROXINE SODIUM 75 UG/1
75 TABLET ORAL DAILY
Qty: 60 TABLET | Refills: 1 | Status: ON HOLD | OUTPATIENT
Start: 2023-01-03 | End: 2023-04-07

## 2023-01-03 NOTE — TELEPHONE ENCOUNTER
"Routing refill request to provider for review/approval because: patient pregnant.    Requested Prescriptions   Pending Prescriptions Disp Refills    levothyroxine (SYNTHROID/LEVOTHROID) 75 MCG tablet 60 tablet 1     Sig: Take 1 tablet (75 mcg) by mouth daily       Thyroid Protocol Failed - 1/3/2023  1:32 PM        Failed - No active pregnancy on record     If patient is pregnant or has had a positive pregnancy test, please check TSH.          Passed - Patient is 12 years or older        Passed - Recent (12 mo) or future (30 days) visit within the authorizing provider's specialty     Patient has had an office visit with the authorizing provider or a provider within the authorizing providers department within the previous 12 mos or has a future within next 30 days. See \"Patient Info\" tab in inbasket, or \"Choose Columns\" in Meds & Orders section of the refill encounter.              Passed - Medication is active on med list        Passed - Normal TSH on file in past 12 months     Recent Labs   Lab Test 11/29/22  1328   TSH 0.74              Passed - No positive pregnancy test in past 12 months     If patient is pregnant or has had a positive pregnancy test, please check TSH.            Julie Behrendt RN       "

## 2023-01-04 ENCOUNTER — E-VISIT (OUTPATIENT)
Dept: FAMILY MEDICINE | Facility: CLINIC | Age: 23
End: 2023-01-04
Payer: COMMERCIAL

## 2023-01-04 DIAGNOSIS — B35.4 TINEA CORPORIS: Primary | ICD-10-CM

## 2023-01-04 PROCEDURE — 99421 OL DIG E/M SVC 5-10 MIN: CPT | Performed by: FAMILY MEDICINE

## 2023-01-04 RX ORDER — PRENATAL VIT 91/IRON/FOLIC/DHA 28-975-200
COMBINATION PACKAGE (EA) ORAL 2 TIMES DAILY
Qty: 30 G | Refills: 1 | Status: SHIPPED | OUTPATIENT
Start: 2023-01-04 | End: 2023-04-12

## 2023-01-06 ENCOUNTER — PRENATAL OFFICE VISIT (OUTPATIENT)
Dept: OBGYN | Facility: CLINIC | Age: 23
End: 2023-01-06
Payer: COMMERCIAL

## 2023-01-06 VITALS
WEIGHT: 272.5 LBS | RESPIRATION RATE: 14 BRPM | SYSTOLIC BLOOD PRESSURE: 121 MMHG | BODY MASS INDEX: 42.77 KG/M2 | TEMPERATURE: 97.6 F | HEART RATE: 98 BPM | DIASTOLIC BLOOD PRESSURE: 71 MMHG | HEIGHT: 67 IN

## 2023-01-06 DIAGNOSIS — Z34.02 ENCOUNTER FOR PRENATAL CARE IN SECOND TRIMESTER OF FIRST PREGNANCY: Primary | ICD-10-CM

## 2023-01-06 DIAGNOSIS — E03.9 HYPOTHYROIDISM DURING PREGNANCY IN SECOND TRIMESTER: ICD-10-CM

## 2023-01-06 DIAGNOSIS — E66.01 MORBID OBESITY (H): ICD-10-CM

## 2023-01-06 DIAGNOSIS — O99.282 HYPOTHYROIDISM DURING PREGNANCY IN SECOND TRIMESTER: ICD-10-CM

## 2023-01-06 DIAGNOSIS — E66.01 MORBID OBESITY WITH BODY MASS INDEX (BMI) GREATER THAN OR EQUAL TO 50 (H): ICD-10-CM

## 2023-01-06 LAB
ERYTHROCYTE [DISTWIDTH] IN BLOOD BY AUTOMATED COUNT: 14.9 % (ref 10–15)
GLUCOSE 1H P 50 G GLC PO SERPL-MCNC: 78 MG/DL (ref 70–129)
HCT VFR BLD AUTO: 34.3 % (ref 35–47)
HGB BLD-MCNC: 10.9 G/DL (ref 11.7–15.7)
MCH RBC QN AUTO: 26.3 PG (ref 26.5–33)
MCHC RBC AUTO-ENTMCNC: 31.8 G/DL (ref 31.5–36.5)
MCV RBC AUTO: 83 FL (ref 78–100)
PLATELET # BLD AUTO: 197 10E3/UL (ref 150–450)
RBC # BLD AUTO: 4.14 10E6/UL (ref 3.8–5.2)
T4 FREE SERPL-MCNC: 1.17 NG/DL (ref 0.9–1.7)
TSH SERPL DL<=0.005 MIU/L-ACNC: 3.13 UIU/ML (ref 0.3–4.2)
WBC # BLD AUTO: 6.5 10E3/UL (ref 4–11)

## 2023-01-06 PROCEDURE — 86780 TREPONEMA PALLIDUM: CPT | Performed by: OBSTETRICS & GYNECOLOGY

## 2023-01-06 PROCEDURE — 82950 GLUCOSE TEST: CPT | Performed by: OBSTETRICS & GYNECOLOGY

## 2023-01-06 PROCEDURE — 99207 PR PRENATAL VISIT: CPT | Performed by: OBSTETRICS & GYNECOLOGY

## 2023-01-06 PROCEDURE — 84439 ASSAY OF FREE THYROXINE: CPT | Performed by: OBSTETRICS & GYNECOLOGY

## 2023-01-06 PROCEDURE — 36415 COLL VENOUS BLD VENIPUNCTURE: CPT | Performed by: OBSTETRICS & GYNECOLOGY

## 2023-01-06 PROCEDURE — 84443 ASSAY THYROID STIM HORMONE: CPT | Performed by: OBSTETRICS & GYNECOLOGY

## 2023-01-06 PROCEDURE — 85027 COMPLETE CBC AUTOMATED: CPT | Performed by: OBSTETRICS & GYNECOLOGY

## 2023-01-06 NOTE — PROGRESS NOTES
"[facility]  Return OB Note     Subjective:  Denied cramping abdominal pain or vaginal bleeding.  +FM. No VB, LOF or ctx.      Objective: /71 (BP Location: Left arm, Patient Position: Chair, Cuff Size: Adult Large)   Pulse 98   Temp 97.6  F (36.4  C) (Tympanic)   Resp 14   Ht 1.702 m (5' 7\")   Wt 123.6 kg (272 lb 8 oz)   LMP 2022   BMI 42.68 kg/m      See OB flowsheet     Assessment/Plan:   Samantha Cody is a 21 year old  at 26w3d by 7w2d ultrasound, here for return OB visit.     Routine prenatal care:  - New OB labs on 2022: O+, antibody negative; Hgb 11.6, plt 228, RPR/HIV/HepB/HepC NR, Rubella immune, chlamydia/gonorrhea neg; PAP smear NILM  - Dating US on 2022 at 7w2d  - Anatomy US WNL, repeat for missing views pending  - Genetic screen: NIPT -low risk  - Immunizations: Declined influenza and COVID-vaccine.       ?Chronic hypertension  -After reviewing EMR, patient was found to have blood pressures of 134/84 on 2017 and 134/98 on 2022.  Patient likely have chronic hypertension diagnosis.  -baseline preeclampsia labs are ordered for today: EKG, AST, ALT, creatinine, hemoglobin, platelet, UPC.  -Low dose aspirin for preeclampsia prevention, ideally started between 12-16 weeks  -s/p level 2  -Likely  fetal surveillance with weekly BPP starting at 32 weeks      Pregravida BMI 43.84  -First trimester GCT passed.  -Patient will be getting growth ultrasounds and  surveillance for chronic hypertension.   oidism  -On levothyroxine 75 mcg daily, which was started during this pregnancy.  -TSH on 2022 was normal.  Repeat TSH with third tri labs  -Maintain euthyroidism via treatment with levothyroxine, aimed at keeping the TSH at the desired range by trimester.   TSH should be checked q4-6 weeks during pregnancy and adjusted to trimester specific norms.      First trimester: 0.1 to 2.5 mIU/L    Second trimester: 0.2 to 3 " mIU/L    Third trimester: 0.3 to 3 mIU/L     RTC 2 wks, labor and FM precautions reviewed  Angela Steele MD  OB/GYN   [place]

## 2023-01-06 NOTE — PROGRESS NOTES
"Initial /71 (BP Location: Left arm, Patient Position: Chair, Cuff Size: Adult Large)   Pulse 98   Temp 97.6  F (36.4  C) (Tympanic)   Resp 14   Ht 1.702 m (5' 7\")   Wt 123.6 kg (272 lb 8 oz)   LMP 06/06/2022   BMI 42.68 kg/m   Estimated body mass index is 42.68 kg/m  as calculated from the following:    Height as of this encounter: 1.702 m (5' 7\").    Weight as of this encounter: 123.6 kg (272 lb 8 oz). .    Gtt draw at 1140  "

## 2023-01-07 LAB — T PALLIDUM AB SER QL: NONREACTIVE

## 2023-01-16 ENCOUNTER — HOSPITAL ENCOUNTER (EMERGENCY)
Facility: CLINIC | Age: 23
Discharge: HOME | End: 2023-01-16
Attending: FAMILY MEDICINE | Admitting: FAMILY MEDICINE
Payer: COMMERCIAL

## 2023-01-16 VITALS
OXYGEN SATURATION: 99 % | DIASTOLIC BLOOD PRESSURE: 50 MMHG | WEIGHT: 269 LBS | BODY MASS INDEX: 42.22 KG/M2 | HEIGHT: 67 IN | SYSTOLIC BLOOD PRESSURE: 128 MMHG | HEART RATE: 80 BPM

## 2023-01-16 DIAGNOSIS — R07.89 CHEST PAIN, NON-CARDIAC: ICD-10-CM

## 2023-01-16 DIAGNOSIS — Z33.1 PREGNANT STATE, INCIDENTAL: ICD-10-CM

## 2023-01-16 LAB
ALBUMIN SERPL BCG-MCNC: 3.4 G/DL (ref 3.5–5.2)
ALP SERPL-CCNC: 105 U/L (ref 35–104)
ALT SERPL W P-5'-P-CCNC: 12 U/L (ref 10–35)
ANION GAP SERPL CALCULATED.3IONS-SCNC: 10 MMOL/L (ref 7–15)
AST SERPL W P-5'-P-CCNC: 14 U/L (ref 10–35)
BASOPHILS # BLD AUTO: 0 10E3/UL (ref 0–0.2)
BASOPHILS NFR BLD AUTO: 0 %
BILIRUB SERPL-MCNC: 0.2 MG/DL
BUN SERPL-MCNC: 6.8 MG/DL (ref 6–20)
CALCIUM SERPL-MCNC: 9.1 MG/DL (ref 8.6–10)
CHLORIDE SERPL-SCNC: 104 MMOL/L (ref 98–107)
CREAT SERPL-MCNC: 0.59 MG/DL (ref 0.51–0.95)
D DIMER PPP FEU-MCNC: 0.73 UG/ML FEU (ref 0–0.5)
DEPRECATED HCO3 PLAS-SCNC: 24 MMOL/L (ref 22–29)
EOSINOPHIL # BLD AUTO: 0 10E3/UL (ref 0–0.7)
EOSINOPHIL NFR BLD AUTO: 1 %
ERYTHROCYTE [DISTWIDTH] IN BLOOD BY AUTOMATED COUNT: 14.8 % (ref 10–15)
GFR SERPL CREATININE-BSD FRML MDRD: >90 ML/MIN/1.73M2
GLUCOSE SERPL-MCNC: 83 MG/DL (ref 70–99)
HCT VFR BLD AUTO: 33.2 % (ref 35–47)
HGB BLD-MCNC: 10.8 G/DL (ref 11.7–15.7)
IMM GRANULOCYTES # BLD: 0 10E3/UL
IMM GRANULOCYTES NFR BLD: 0 %
LIPASE SERPL-CCNC: 17 U/L (ref 13–60)
LYMPHOCYTES # BLD AUTO: 1.4 10E3/UL (ref 0.8–5.3)
LYMPHOCYTES NFR BLD AUTO: 16 %
MCH RBC QN AUTO: 26.2 PG (ref 26.5–33)
MCHC RBC AUTO-ENTMCNC: 32.5 G/DL (ref 31.5–36.5)
MCV RBC AUTO: 80 FL (ref 78–100)
MONOCYTES # BLD AUTO: 0.4 10E3/UL (ref 0–1.3)
MONOCYTES NFR BLD AUTO: 4 %
NEUTROPHILS # BLD AUTO: 6.5 10E3/UL (ref 1.6–8.3)
NEUTROPHILS NFR BLD AUTO: 79 %
NRBC # BLD AUTO: 0 10E3/UL
NRBC BLD AUTO-RTO: 0 /100
PLATELET # BLD AUTO: 199 10E3/UL (ref 150–450)
POTASSIUM SERPL-SCNC: 4.2 MMOL/L (ref 3.4–5.3)
PROT SERPL-MCNC: 6.5 G/DL (ref 6.4–8.3)
RBC # BLD AUTO: 4.13 10E6/UL (ref 3.8–5.2)
SODIUM SERPL-SCNC: 138 MMOL/L (ref 136–145)
TROPONIN T SERPL HS-MCNC: <6 NG/L
WBC # BLD AUTO: 8.3 10E3/UL (ref 4–11)

## 2023-01-16 PROCEDURE — 76604 US EXAM CHEST: CPT | Mod: 26 | Performed by: FAMILY MEDICINE

## 2023-01-16 PROCEDURE — 99284 EMERGENCY DEPT VISIT MOD MDM: CPT | Mod: 25 | Performed by: FAMILY MEDICINE

## 2023-01-16 PROCEDURE — 93010 ELECTROCARDIOGRAM REPORT: CPT | Mod: 59 | Performed by: FAMILY MEDICINE

## 2023-01-16 PROCEDURE — 76604 US EXAM CHEST: CPT | Performed by: FAMILY MEDICINE

## 2023-01-16 PROCEDURE — 93308 TTE F-UP OR LMTD: CPT | Performed by: FAMILY MEDICINE

## 2023-01-16 PROCEDURE — 93005 ELECTROCARDIOGRAM TRACING: CPT | Mod: 59 | Performed by: FAMILY MEDICINE

## 2023-01-16 PROCEDURE — 84484 ASSAY OF TROPONIN QUANT: CPT | Performed by: FAMILY MEDICINE

## 2023-01-16 PROCEDURE — 85025 COMPLETE CBC W/AUTO DIFF WBC: CPT | Performed by: FAMILY MEDICINE

## 2023-01-16 PROCEDURE — 85379 FIBRIN DEGRADATION QUANT: CPT | Performed by: FAMILY MEDICINE

## 2023-01-16 PROCEDURE — 93308 TTE F-UP OR LMTD: CPT | Mod: 26 | Performed by: FAMILY MEDICINE

## 2023-01-16 PROCEDURE — 83690 ASSAY OF LIPASE: CPT | Performed by: FAMILY MEDICINE

## 2023-01-16 PROCEDURE — 36415 COLL VENOUS BLD VENIPUNCTURE: CPT | Performed by: FAMILY MEDICINE

## 2023-01-16 PROCEDURE — 80053 COMPREHEN METABOLIC PANEL: CPT | Performed by: FAMILY MEDICINE

## 2023-01-16 PROCEDURE — 99285 EMERGENCY DEPT VISIT HI MDM: CPT | Mod: 25 | Performed by: FAMILY MEDICINE

## 2023-01-16 ASSESSMENT — ENCOUNTER SYMPTOMS
FEVER: 0
DYSURIA: 0
SORE THROAT: 0
CHILLS: 0
VOMITING: 0
BLOOD IN STOOL: 0
WHEEZING: 0
COUGH: 0
DIAPHORESIS: 0
PALPITATIONS: 0
NAUSEA: 0
CONSTIPATION: 0
ABDOMINAL PAIN: 0
HEADACHES: 0
FREQUENCY: 0
DIARRHEA: 0
SHORTNESS OF BREATH: 0
SINUS PRESSURE: 0

## 2023-01-16 ASSESSMENT — ACTIVITIES OF DAILY LIVING (ADL): ADLS_ACUITY_SCORE: 35

## 2023-01-16 NOTE — ED TRIAGE NOTES
Pt reports chest pain 2 hours ago , worsening moving left arm with no known injury.  28 weeks pregnant . EKG taken in Nov.      Writer and provider spoke with pt about treatment and diagnostic options in ED vs. UC. Pt agreed to be evaluated in the ED.

## 2023-01-16 NOTE — ED TRIAGE NOTES
CP that started 2.5 hr ago.  everytime she takes a deep breath or leans over.  Ut came on suddenly when she bent over.  Never had this pain before.  It feels stabbing and 6/10 at rest.  Pt is no on hormones, no travel, pt is pregnant: 28 weeks tomorrow.  No issues with pregnancy and is being seen by OB.

## 2023-01-16 NOTE — ED PROVIDER NOTES
History     Chief Complaint   Patient presents with     Chest Pain     HPI  Samantha Cody is a 22 year old female who presents with 28-week pregnancy with anterior chest pain for last 2-1/2 hours onset as she leaned forward.  Possibly as she took a deep breath initially.  Stabbing sensation 6 out of 10 in intensity at rest although resolving now.  She is not on hormonal therapy is on aspirin since early in pregnancy due to elevated blood pressures.  No signs of preeclampsia no history of preeclampsia.  She has had an otherwise uncomplicated pregnancy.  No VTE risk factors otherwise.  No prolonged travel but she does sit for prolonged periods of time.  She is active at work.  No obvious overuse injury.  She denies any exertional chest pain or shortness of breath.  There is no nausea vomiting sweats.  There has been no leg edema.    Allergies:  No Known Allergies    Problem List:    Patient Active Problem List    Diagnosis Date Noted     Prenatal care, first pregnancy 08/11/2022     Priority: Medium     DESTINYB- Prashant Caicedo       Morbid obesity (H) 06/09/2022     Priority: Medium     Multiple joint pain 02/18/2015     Priority: Medium        Past Medical History:    Past Medical History:   Diagnosis Date     Hypothyroidism        Past Surgical History:    Past Surgical History:   Procedure Laterality Date     NO HISTORY OF SURGERY         Family History:    Family History   Problem Relation Age of Onset     Hypertension Mother      Hypertension Father      Thyroid Disease Paternal Grandmother      Gynecology Other         ovarian cancer age:25 -paternal aunt       Social History:  Marital Status:  Single [1]  Social History     Tobacco Use     Smoking status: Every Day     Types: Vaping Device     Smokeless tobacco: Never     Tobacco comments:     cutting down with pregnancy   Vaping Use     Vaping Use: Every day     Substances: Nicotine     Devices: Disposable   Substance Use Topics     Alcohol use: Not  "Currently     Drug use: No        Medications:    aspirin 81 MG EC tablet  levothyroxine (SYNTHROID/LEVOTHROID) 75 MCG tablet  Prenatal Vit-Fe Fumarate-FA (PRENATAL MULTIVITAMIN W/IRON) 27-0.8 MG tablet  terbinafine (LAMISIL) 1 % external cream          Review of Systems   Constitutional: Negative for chills, diaphoresis and fever.   HENT: Negative for ear pain, sinus pressure and sore throat.    Eyes: Negative for visual disturbance.   Respiratory: Negative for cough, shortness of breath and wheezing.    Cardiovascular: Positive for chest pain. Negative for palpitations.   Gastrointestinal: Negative for abdominal pain, blood in stool, constipation, diarrhea, nausea and vomiting.   Genitourinary: Negative for dysuria, frequency and urgency.   Skin: Negative for rash.   Neurological: Negative for headaches.   All other systems reviewed and are negative.      Physical Exam   BP: 127/83  Pulse: 107  Height: 170.2 cm (5' 7\")  Weight: 122 kg (269 lb)  SpO2: 98 %      Physical Exam  Constitutional:       General: She is in acute distress.   HENT:      Head: Atraumatic.   Eyes:      Conjunctiva/sclera: Conjunctivae normal.   Cardiovascular:      Rate and Rhythm: Normal rate and regular rhythm.      Heart sounds: No murmur heard.  Pulmonary:      Effort: Pulmonary effort is normal. No respiratory distress.      Breath sounds: Normal breath sounds. No stridor. No wheezing or rhonchi.   Abdominal:      General: Abdomen is flat. There is distension (gravid).      Palpations: Abdomen is soft. There is no mass.      Tenderness: There is no abdominal tenderness. There is no guarding.   Musculoskeletal:      Cervical back: Neck supple.      Right lower leg: No edema.      Left lower leg: No edema.   Skin:     Coloration: Skin is not pale.      Findings: No rash.   Neurological:      General: No focal deficit present.      Mental Status: She is alert and oriented to person, place, and time.      Cranial Nerves: No cranial nerve " deficit.      Sensory: No sensory deficit.      Motor: No weakness.      Coordination: Coordination normal.         ED Course                 Procedures                EKG Interpretation:      Interpreted by Carlos Islas MD  EKG done at 1558 hrs. demonstrates a sinus rhythm 92 bpm normal axis.  There is no ST change.  There is no T wave changes.  There is U waves present.  There is a normal R progression.  No Q waves.  Normal intervals.  Normal conduction.  No ectopy.  Impression sinus rhythm 92 bpm with possible U waves on EKG.  Await potassium      Critical Care time:  none               Results for orders placed or performed during the hospital encounter of 01/16/23 (from the past 24 hour(s))   CBC with platelets differential    Narrative    The following orders were created for panel order CBC with platelets differential.  Procedure                               Abnormality         Status                     ---------                               -----------         ------                     CBC with platelets and d...[[id number]]  Abnormal            Final result                 Please view results for these tests on the individual orders.   Comprehensive metabolic panel   Result Value Ref Range    Sodium 138 136 - 145 mmol/L    Potassium 4.2 3.4 - 5.3 mmol/L    Chloride 104 98 - 107 mmol/L    Carbon Dioxide (CO2) 24 22 - 29 mmol/L    Anion Gap 10 7 - 15 mmol/L    Urea Nitrogen 6.8 6.0 - 20.0 mg/dL    Creatinine 0.59 0.51 - 0.95 mg/dL    Calcium 9.1 8.6 - 10.0 mg/dL    Glucose 83 70 - 99 mg/dL    Alkaline Phosphatase 105 (H) 35 - 104 U/L    AST 14 10 - 35 U/L    ALT 12 10 - 35 U/L    Protein Total 6.5 6.4 - 8.3 g/dL    Albumin 3.4 (L) 3.5 - 5.2 g/dL    Bilirubin Total 0.2 <=1.2 mg/dL    GFR Estimate >90 >60 mL/min/1.73m2   Lipase   Result Value Ref Range    Lipase 17 13 - 60 U/L   Troponin T, High Sensitivity   Result Value Ref Range    Troponin T, High Sensitivity <6 <=14 ng/L   D dimer quantitative    Result Value Ref Range    D-Dimer Quantitative 0.73 (H) 0.00 - 0.50 ug/mL FEU    Narrative    This D-dimer assay is intended for use in conjunction with a clinical pretest probability assessment model to exclude pulmonary embolism (PE) and deep venous thrombosis (DVT) in outpatients suspected of PE or DVT. The cut-off value is 0.50 ug/mL FEU.   CBC with platelets and differential   Result Value Ref Range    WBC Count 8.3 4.0 - 11.0 10e3/uL    RBC Count 4.13 3.80 - 5.20 10e6/uL    Hemoglobin 10.8 (L) 11.7 - 15.7 g/dL    Hematocrit 33.2 (L) 35.0 - 47.0 %    MCV 80 78 - 100 fL    MCH 26.2 (L) 26.5 - 33.0 pg    MCHC 32.5 31.5 - 36.5 g/dL    RDW 14.8 10.0 - 15.0 %    Platelet Count 199 150 - 450 10e3/uL    % Neutrophils 79 %    % Lymphocytes 16 %    % Monocytes 4 %    % Eosinophils 1 %    % Basophils 0 %    % Immature Granulocytes 0 %    NRBCs per 100 WBC 0 <1 /100    Absolute Neutrophils 6.5 1.6 - 8.3 10e3/uL    Absolute Lymphocytes 1.4 0.8 - 5.3 10e3/uL    Absolute Monocytes 0.4 0.0 - 1.3 10e3/uL    Absolute Eosinophils 0.0 0.0 - 0.7 10e3/uL    Absolute Basophils 0.0 0.0 - 0.2 10e3/uL    Absolute Immature Granulocytes 0.0 <=0.4 10e3/uL    Absolute NRBCs 0.0 10e3/uL   POC US CHEST B-SCAN    Impression    [facility] Procedure Note      Limited Bedside ED Cardiac Ultrasound:    PROCEDURE: PERFORMED BY: Dr. Carlos Islas MD  INDICATIONS/SYMPTOM:  Chest Pain  PROBE: Cardiac phased array probe and High frequency linear probe  BODY LOCATION: Chest  FINDINGS:   The ultrasound was performed utilizing the parasternal long axis, parasternal short axis and apical 4 chamber views.  Cardiac contractility:  Present  Gross estimation of cardiac kinesis: normal  Pericardial Effusion:  None  RV:LV ratio: LV > RV  IVC: unable                                              Collapsibility:  IVC collapses < 50% with inspiration  INTERPRETATION:    Chamber size and motion were grossly normal with LV > RV, normal cardiac kinesis.  No  pericardial effusion was found. not visualized  IMAGE DOCUMENTATION: Images were archived to PACs system.        [facility] Procedure Note      Limited Bedside ED Ultrasound of Thorax:    PROCEDURE: PERFORMED BY: Dr. Carlos Islas MD  INDICATIONS/SYMPTOM:  Chest pain  PROBE: High frequency linear probe  BODY LOCATION: Chest  FINDINGS:  Images of both lung hemithoracies taken in 2D in multiple rib spaces        Right side:  Lung sliding artifact  Present     Comet tail artifacts  Absent   Left side:  Lung sliding artifact  Present     Comet tail artifacts  Absent   Hemothorax: Right side Absent     Left side Absent   Pleural effusion: Right side Absent      Left side Absent    INTERPRETATION: The exam was normal. There was no free fluid identified in the chest cavity. No evidence of pneumothorax, hemothorax or pleural effusion.  IMAGE DOCUMENTATION: Images were archived to PACs system.           Medications - No data to display    Assessments & Plan (with Medical Decision Making)     MDM: Samantha Cody is a 22 year old female who presents with pregnancy at 28 weeks and sits for prolonged periods of time now with anterior chest pain cute onset today leaning forward without associated dyspnea no exertional symptoms but worse with leaning forward and occasionally with taking deep breaths.  Mildly tachycardic although not out of the ordinary for pregnancy 107.  Plan for more broad-based evaluation EKG CBC comforts panel.  We discussed the D-dimer and the potential for CTA and we will go forward with this given pregnancy and some risk.  Also check troponin for pericarditis.  Recommend not pursuing chest x-ray as lungs are clear no cough congestion no symptoms suggestive of pneumonia.  Even that a chest x-ray was not performed I did check bedside ultrasound and demonstrated no obvious pneumothorax, no obvious lung consolidation at the bases and pleural effusions.  No pericardial effusion.  D-dimer was  reassuring for her time in pregnancy.  The cutoff in second trimester is 0.75 and the cutoff in third trimester is 1.0 she met both of these cutoffs.    I do suspect the episode of chest pain that was brought on by her leaning forward likely was from pain radiating into the chest from some diaphragm from the compression.  I see no obvious cardiopulmonary causes on today's evaluation.  We discussed management as below.  Precautions given for return.      I have reviewed the nursing notes.    I have reviewed the findings, diagnosis, plan and need for follow up with the patient.       Medical Decision Making  The patient presented with a problem that is an acute illness with systemic symptoms.    The patient's evaluation involved:  ordering and review of 3+ test(s) (see separate area of note for details)  strong consideration of a test (see separate area of note for details) that was ultimately deferred    The patient's management involved only simple and very low risk treatment.        New Prescriptions    No medications on file       Final diagnoses:   Chest pain, non-cardiac - d-dimer normal for second-third trimester of pregnancy.  no current serious findings on evaluation., normal ekg, bedside ultrasound.  no symptoms to suggest pneumonia.  plan follow-up clinic and return for worsening - or exertional chest pain, shortness of breath   Pregnant state, incidental       1/16/2023   [facility] EMERGENCY DEPT     Carlos Islas MD  01/17/23 0031     solids

## 2023-01-17 NOTE — DISCHARGE INSTRUCTIONS
ICD-10-CM    1. Chest pain, non-cardiac  R07.89     d-dimer normal for second-third trimester of pregnancy.  no current serious findings on evaluation., normal ekg, bedside ultrasound.  no symptoms to suggest pneumonia.  plan follow-up clinic and return for worsening - or exertional chest pain, shortness of breath      2. Pregnant state, incidental  Z33.1

## 2023-01-18 ENCOUNTER — HOSPITAL ENCOUNTER (OUTPATIENT)
Dept: ULTRASOUND IMAGING | Facility: CLINIC | Age: 23
Discharge: HOME | End: 2023-01-18
Attending: NURSE PRACTITIONER | Admitting: NURSE PRACTITIONER
Payer: COMMERCIAL

## 2023-01-18 ENCOUNTER — ANCILLARY PROCEDURE (OUTPATIENT)
Dept: ULTRASOUND IMAGING | Facility: HOSPITAL | Age: 23
End: 2023-01-18
Attending: OBSTETRICS & GYNECOLOGY
Payer: COMMERCIAL

## 2023-01-18 ENCOUNTER — OFFICE VISIT (OUTPATIENT)
Dept: MATERNAL FETAL MEDICINE | Facility: HOSPITAL | Age: 23
End: 2023-01-18
Attending: OBSTETRICS & GYNECOLOGY
Payer: COMMERCIAL

## 2023-01-18 DIAGNOSIS — E03.9 HYPOTHYROID IN PREGNANCY, ANTEPARTUM: ICD-10-CM

## 2023-01-18 DIAGNOSIS — O99.212 MATERNAL OBESITY SYNDROME IN SECOND TRIMESTER: ICD-10-CM

## 2023-01-18 DIAGNOSIS — O99.280 HYPOTHYROID IN PREGNANCY, ANTEPARTUM: ICD-10-CM

## 2023-01-18 DIAGNOSIS — O10.912 MATERNAL CHRONIC HYPERTENSION IN SECOND TRIMESTER: ICD-10-CM

## 2023-01-18 DIAGNOSIS — O10.913 MATERNAL CHRONIC HYPERTENSION IN THIRD TRIMESTER: ICD-10-CM

## 2023-01-18 DIAGNOSIS — R92.8 BI-RADS CATEGORY 3 MAMMOGRAM RESULT: ICD-10-CM

## 2023-01-18 DIAGNOSIS — E66.01 OBESITY, CLASS III, BMI 40-49.9 (MORBID OBESITY) (H): Primary | ICD-10-CM

## 2023-01-18 PROCEDURE — 99207 PR NO CHARGE LOS: CPT | Performed by: STUDENT IN AN ORGANIZED HEALTH CARE EDUCATION/TRAINING PROGRAM

## 2023-01-18 PROCEDURE — 76816 OB US FOLLOW-UP PER FETUS: CPT | Mod: 26 | Performed by: STUDENT IN AN ORGANIZED HEALTH CARE EDUCATION/TRAINING PROGRAM

## 2023-01-18 PROCEDURE — 76816 OB US FOLLOW-UP PER FETUS: CPT

## 2023-01-18 PROCEDURE — 76642 ULTRASOUND BREAST LIMITED: CPT | Mod: LT

## 2023-01-18 NOTE — NURSING NOTE
Samantha Cody is a  at 28w1d who presents to [facility] for a follow up growth ultrasound d/t CHTN and BMI >40. VSS. Pt reports positive fetal movement. Pt denies bldg/lof/change in discharge, contractions, headache, vision changes, chest pain/SOB or edema. SBAR given to Dr. Betancourt, see note in Epic.     Patient asked about recent blood pressure control including any home or clinic blood pressures worth noting. Pt reports no changes or new medications.

## 2023-01-18 NOTE — PROGRESS NOTES
Please see the full imaging report from the [employer] program under the imaging tab.    Margy Betancourt MD  Maternal Fetal Medicine

## 2023-01-20 ENCOUNTER — PRENATAL OFFICE VISIT (OUTPATIENT)
Dept: OBGYN | Facility: CLINIC | Age: 23
End: 2023-01-20
Payer: COMMERCIAL

## 2023-01-20 ENCOUNTER — TELEPHONE (OUTPATIENT)
Dept: OBGYN | Facility: CLINIC | Age: 23
End: 2023-01-20

## 2023-01-20 VITALS
BODY MASS INDEX: 42.22 KG/M2 | RESPIRATION RATE: 18 BRPM | HEIGHT: 67 IN | HEART RATE: 93 BPM | DIASTOLIC BLOOD PRESSURE: 70 MMHG | WEIGHT: 269 LBS | SYSTOLIC BLOOD PRESSURE: 124 MMHG | TEMPERATURE: 98 F

## 2023-01-20 DIAGNOSIS — E66.01 MORBID OBESITY (H): ICD-10-CM

## 2023-01-20 DIAGNOSIS — Z34.93 PRENATAL CARE IN THIRD TRIMESTER: Primary | ICD-10-CM

## 2023-01-20 PROCEDURE — 99207 PR PRENATAL VISIT: CPT | Performed by: ADVANCED PRACTICE MIDWIFE

## 2023-01-20 NOTE — NURSING NOTE
"Initial /70 (BP Location: Left arm, Patient Position: Chair, Cuff Size: Adult Large)   Pulse 93   Temp 98  F (36.7  C) (Tympanic)   Resp 18   Ht 1.702 m (5' 7\")   Wt 122 kg (269 lb)   LMP 06/06/2022   BMI 42.13 kg/m   Estimated body mass index is 42.13 kg/m  as calculated from the following:    Height as of this encounter: 1.702 m (5' 7\").    Weight as of this encounter: 122 kg (269 lb). .      "

## 2023-01-20 NOTE — TELEPHONE ENCOUNTER
[facility]  calling.  She is working with the [facility] to assist this patient through out her pregnancy to support her with education and resources as needed.  Please reach out to  Padmini Arellano (phone number [phone]) if you would like any assistance with resources for this patient.

## 2023-01-20 NOTE — PROGRESS NOTES
Feeling well.  Baby is active. Denies any leaking of fluid, vaginal bleeding, regular uterine contractions, or headaches or other concerns.  She continues to lose weight, but she feels well and is eating to hunger.  She think that it is due to restarting thyroid medication.  I encouraged her to keep taking it after the baby is born.  Discussed TDAP.  She will consider for next visit.    Reviewed to call for contractions, loss of fluid, vaginal bleeding, decreased fetal movement or any other questions or concerns.    RTC in 2 weeks.  Beckie Moralez, DNP, APRN, CNM

## 2023-02-03 ENCOUNTER — PRENATAL OFFICE VISIT (OUTPATIENT)
Dept: OBGYN | Facility: CLINIC | Age: 23
End: 2023-02-03
Payer: COMMERCIAL

## 2023-02-03 VITALS
DIASTOLIC BLOOD PRESSURE: 69 MMHG | RESPIRATION RATE: 18 BRPM | HEIGHT: 67 IN | WEIGHT: 273 LBS | SYSTOLIC BLOOD PRESSURE: 131 MMHG | BODY MASS INDEX: 42.85 KG/M2 | HEART RATE: 90 BPM | TEMPERATURE: 97.4 F

## 2023-02-03 DIAGNOSIS — Z34.03 ENCOUNTER FOR PRENATAL CARE IN THIRD TRIMESTER OF FIRST PREGNANCY: Primary | ICD-10-CM

## 2023-02-03 PROCEDURE — 99207 PR PRENATAL VISIT: CPT | Performed by: OBSTETRICS & GYNECOLOGY

## 2023-02-03 PROCEDURE — 90715 TDAP VACCINE 7 YRS/> IM: CPT | Performed by: OBSTETRICS & GYNECOLOGY

## 2023-02-03 PROCEDURE — 90471 IMMUNIZATION ADMIN: CPT | Performed by: OBSTETRICS & GYNECOLOGY

## 2023-02-03 NOTE — PROGRESS NOTES
"[facility]  Return OB Note     Subjective:  Denied cramping abdominal pain or vaginal bleeding.  +FM. No VB, LOF or ctx.     Pt traveling in three weeks to [place] by car     Objective: /69 (BP Location: Right arm, Patient Position: Chair, Cuff Size: Adult Large)   Pulse 90   Temp 97.4  F (36.3  C) (Tympanic)   Resp 18   Ht 1.702 m (5' 7\")   Wt 123.8 kg (273 lb)   LMP 2022   BMI 42.76 kg/m     FH 30  Doptones: 150s         Assessment/Plan:   Samantha Cody is a 21 year old  at 30w3d  by 7w2d ultrasound, here for return OB visit.     Routine prenatal care:  - New OB labs on 2022: O+, antibody negative; Hgb 11.6, plt 228, RPR/HIV/HepB/HepC NR, Rubella immune, chlamydia/gonorrhea neg; PAP smear NILM  - Dating US on 2022 at 7w2d  - Anatomy US WNL, repeat for missing views WNL  - Genetic screen: NIPT -low risk  - Immunizations: Declined influenza and COVID-vaccine.       ?Chronic hypertension  -After reviewing EMR, patient was found to have blood pressures of 134/84 on 2017 and 134/98 on 2022.  Patient likely have chronic hypertension diagnosis.  -baseline preeclampsia labs are ordered for today: EKG, AST, ALT, creatinine, hemoglobin, platelet, UPC.  -Low dose aspirin for preeclampsia prevention, ideally started between 12-16 weeks  -s/p level 2  -  fetal surveillance with weekly BPP starting at 32 weeks, ordered     Pregravida BMI 43.84  -First trimester GCT passed.  -Patient will be getting growth ultrasounds and  surveillance for chronic hypertension.  Hypothyroidism  -On levothyroxine 75 mcg daily, which was started during this pregnancy.  -TSH on 2022 was normal.  Repeat TSH with third tri labs    -Maintain euthyroidism via treatment with levothyroxine, aimed at keeping the TSH at the desired range by trimester.   TSH should be checked q4-6 - plan recheck next visit    First trimester: 0.1 to 2.5 mIU/L    Second trimester: " 0.2 to 3 mIU/L    Third trimester: 0.3 to 3 mIU/L    Upcoming travel- discussed completing BPP/visit right before she leaves again again right after she comes back as she will be gone for one week. Also discussed increased risk for VTE and recommendation for frequent stops and recommended she bring a copy of prenatal records with her on trip.     RTC 2 wks, labor and FM precautions reviewed  Cortney Fonseca MD

## 2023-02-03 NOTE — PROGRESS NOTES
Prior to immunization administration, verified patients identity using patient s name and date of birth. Please see Immunization Activity for additional information.     Screening Questionnaire for Adult Immunization    Are you sick today?   No   Do you have allergies to medications, food, a vaccine component or latex?   No   Have you ever had a serious reaction after receiving a vaccination?   No   Do you have a long-term health problem with heart, lung, kidney, or metabolic disease (e.g., diabetes), asthma, a blood disorder, no spleen, complement component deficiency, a cochlear implant, or a spinal fluid leak?  Are you on long-term aspirin therapy?   No   Do you have cancer, leukemia, HIV/AIDS, or any other immune system problem?   No   Do you have a parent, brother, or sister with an immune system problem?   No   In the past 3 months, have you taken medications that affect  your immune system, such as prednisone, other steroids, or anticancer drugs; drugs for the treatment of rheumatoid arthritis, Crohn s disease, or psoriasis; or have you had radiation treatments?   No   Have you had a seizure, or a brain or other nervous system problem?   No   During the past year, have you received a transfusion of blood or blood    products, or been given immune (gamma) globulin or antiviral drug?   No   For women: Are you pregnant or is there a chance you could become       pregnant during the next month?   yes   Have you received any vaccinations in the past 4 weeks?   No     Immunization questionnaire answers were all negative.        Per orders of Dr. Fonseca, injection of adacel given by Ebonie Lanier CMA. Patient instructed to remain in clinic for 15 minutes afterwards, and to report any adverse reaction to me immediately.       Screening performed by Eobnie Lanier CMA on 2/3/2023 at 10:41 AM.

## 2023-02-03 NOTE — PROGRESS NOTES
"/69 (BP Location: Right arm, Patient Position: Chair, Cuff Size: Adult Large)   Pulse 90   Temp 97.4  F (36.3  C) (Tympanic)   Resp 18   Ht 1.702 m (5' 7\")   Wt 123.8 kg (273 lb)   LMP 06/06/2022   BMI 42.76 kg/m     "

## 2023-02-03 NOTE — NURSING NOTE
"Initial /69 (BP Location: Right arm, Patient Position: Chair, Cuff Size: Adult Large)   Pulse 90   Temp 97.4  F (36.3  C) (Tympanic)   Resp 18   Ht 1.702 m (5' 7\")   Wt 123.8 kg (273 lb)   LMP 06/06/2022   BMI 42.76 kg/m   Estimated body mass index is 42.76 kg/m  as calculated from the following:    Height as of this encounter: 1.702 m (5' 7\").    Weight as of this encounter: 123.8 kg (273 lb). .      "

## 2023-02-15 ENCOUNTER — HOSPITAL ENCOUNTER (OUTPATIENT)
Dept: ULTRASOUND IMAGING | Facility: CLINIC | Age: 23
Discharge: HOME | End: 2023-02-15
Attending: OBSTETRICS & GYNECOLOGY | Admitting: OBSTETRICS & GYNECOLOGY
Payer: COMMERCIAL

## 2023-02-15 DIAGNOSIS — Z34.03 ENCOUNTER FOR PRENATAL CARE IN THIRD TRIMESTER OF FIRST PREGNANCY: ICD-10-CM

## 2023-02-15 PROCEDURE — 76819 FETAL BIOPHYS PROFIL W/O NST: CPT

## 2023-02-17 ENCOUNTER — PRENATAL OFFICE VISIT (OUTPATIENT)
Dept: OBGYN | Facility: CLINIC | Age: 23
End: 2023-02-17
Payer: COMMERCIAL

## 2023-02-17 VITALS
TEMPERATURE: 98.2 F | HEART RATE: 80 BPM | WEIGHT: 272 LBS | DIASTOLIC BLOOD PRESSURE: 69 MMHG | RESPIRATION RATE: 16 BRPM | SYSTOLIC BLOOD PRESSURE: 126 MMHG | BODY MASS INDEX: 42.69 KG/M2 | HEIGHT: 67 IN

## 2023-02-17 DIAGNOSIS — E03.9 HYPOTHYROIDISM, UNSPECIFIED TYPE: ICD-10-CM

## 2023-02-17 DIAGNOSIS — Z3A.32 PREGNANCY WITH 32 COMPLETED WEEKS GESTATION: Primary | ICD-10-CM

## 2023-02-17 DIAGNOSIS — O99.013 ANEMIA DURING PREGNANCY IN THIRD TRIMESTER: ICD-10-CM

## 2023-02-17 LAB
FERRITIN SERPL-MCNC: 6 NG/ML (ref 6–175)
IRON BINDING CAPACITY (ROCHE): 468 UG/DL (ref 240–430)
IRON SATN MFR SERPL: 9 % (ref 15–46)
IRON SERPL-MCNC: 41 UG/DL (ref 37–145)
TSH SERPL DL<=0.005 MIU/L-ACNC: 2.33 UIU/ML (ref 0.3–4.2)

## 2023-02-17 PROCEDURE — 84443 ASSAY THYROID STIM HORMONE: CPT | Performed by: OBSTETRICS & GYNECOLOGY

## 2023-02-17 PROCEDURE — 82728 ASSAY OF FERRITIN: CPT | Performed by: OBSTETRICS & GYNECOLOGY

## 2023-02-17 PROCEDURE — 36415 COLL VENOUS BLD VENIPUNCTURE: CPT | Performed by: OBSTETRICS & GYNECOLOGY

## 2023-02-17 PROCEDURE — 99207 PR PRENATAL VISIT: CPT | Performed by: OBSTETRICS & GYNECOLOGY

## 2023-02-17 PROCEDURE — 83550 IRON BINDING TEST: CPT | Performed by: OBSTETRICS & GYNECOLOGY

## 2023-02-17 PROCEDURE — 83540 ASSAY OF IRON: CPT | Performed by: OBSTETRICS & GYNECOLOGY

## 2023-02-17 RX ORDER — FERROUS SULFATE 325(65) MG
325 TABLET ORAL
Qty: 30 TABLET | Refills: 3 | Status: SHIPPED | OUTPATIENT
Start: 2023-02-17 | End: 2023-07-07

## 2023-02-17 RX ORDER — ASCORBIC ACID 500 MG
500 TABLET ORAL DAILY
Qty: 60 TABLET | Refills: 3 | Status: SHIPPED | OUTPATIENT
Start: 2023-02-17 | End: 2023-07-07

## 2023-02-17 RX ORDER — MAGNESIUM 200 MG
1000 TABLET ORAL DAILY
Qty: 90 TABLET | Refills: 1 | Status: SHIPPED | OUTPATIENT
Start: 2023-02-17 | End: 2023-03-22

## 2023-02-17 NOTE — PROGRESS NOTES
"[facility]    Return OB Note    CC: Return OB     Subjective:  Samantha is a 22 year old  at 32w3d   Denies vaginal bleeding, loss of fluid, or regular contractions. Pos fetal movement. No headache, visual disturbance or RUQ pain.  Complaints today: had difficulty scheduling weekly ultrasounds.      Objective:  /69 (BP Location: Right arm, Patient Position: Chair, Cuff Size: Adult Regular)   Pulse 80   Temp 98.2  F (36.8  C) (Tympanic)   Resp 16   Ht 1.702 m (5' 7\")   Wt 123.4 kg (272 lb)   LMP 2022   BMI 42.60 kg/m      See flowsheet      Assessment/Plan:       22 year old year old  female with IUP at 32w3d  Encounter Diagnoses   Name Primary?     Hypothyroidism, unspecified type      Pregnancy with 32 completed weeks gestation Yes     Anemia during pregnancy in third trimester        Routine prenatal care:  - New OB labs on 2022: O+, antibody negative; Hgb 11.6, plt 228, RPR/HIV/HepB/HepC NR, Rubella immune, chlamydia/gonorrhea neg; PAP smear NILM  - Dating US on 2022 at 7w2d  - Anatomy US WNL, repeat for missing views WNL  - Genetic screen: NIPT -low risk  - Immunizations: Declined influenza and COVID-vaccine.       ?Chronic hypertension  -After reviewing EMR, patient was found to have blood pressures of 134/84 on 2017 and 134/98 on 2022.  Patient likely have chronic hypertension diagnosis.  -baseline preeclampsia labs are ordered for today: EKG, AST, ALT, creatinine, hemoglobin, platelet, UPC.  -Low dose aspirin for preeclampsia prevention, ideally started between 12-16 weeks  -s/p level 2  -  fetal surveillance with weekly BPP starting at 32 weeks, ordered     Pregravida BMI 43.84  -First trimester GCT passed.  -Patient will be getting growth ultrasounds and  surveillance for chronic hypertension.  Hypothyroidism  -On levothyroxine 75 mcg daily, which was started during this pregnancy.  -TSH on 2022 was " normal.  Repeat TSH was slightly elevated. Repeat TSH today.    -Maintain euthyroidism via treatment with levothyroxine, aimed at keeping the TSH at the desired range by trimester.   TSH should be checked q4-6 - plan recheck next visit    First trimester: 0.1 to 2.5 mIU/L    Second trimester: 0.2 to 3 mIU/L    Third trimester: 0.3 to 3 mIU/L    Upcoming travel- discussed completing BPP/visit right before she leaves again again right after she comes back as she will be gone for one week. Also discussed increased risk for VTE and recommendation for frequent stops and recommended she bring a copy of prenatal records with her on trip.    -BPP- 2/15/23 normal growth, , reviewed and weekly ultrasound orders are in the system.  Her growth scan was moved to 4 weeks, but she will need to schedule weekly BPPs.       -Mild anemia, will start iron, B12 and vitamin C.  Recheck 4 weeks.    Return precautions given  Discussed  labor and preeclampsia precautions.  Discussed fetal movement precautions.    RTC 2 weeks    Nadege Floyd MD

## 2023-02-17 NOTE — NURSING NOTE
"Initial /69 (BP Location: Right arm, Patient Position: Chair, Cuff Size: Adult Regular)   Pulse 80   Temp 98.2  F (36.8  C) (Tympanic)   Resp 16   Ht 1.702 m (5' 7\")   Wt 123.4 kg (272 lb)   LMP 06/06/2022   BMI 42.60 kg/m   Estimated body mass index is 42.6 kg/m  as calculated from the following:    Height as of this encounter: 1.702 m (5' 7\").    Weight as of this encounter: 123.4 kg (272 lb). .    Jo Mendez, [facility]    "

## 2023-02-23 DIAGNOSIS — O99.019 ANEMIA DURING PREGNANCY: Primary | ICD-10-CM

## 2023-02-23 RX ORDER — LANOLIN ALCOHOL/MO/W.PET/CERES
1000 CREAM (GRAM) TOPICAL DAILY
Qty: 90 TABLET | Refills: 1 | Status: SHIPPED | OUTPATIENT
Start: 2023-02-23 | End: 2023-07-07

## 2023-02-23 NOTE — TELEPHONE ENCOUNTER
Pharmacy calling.  Insurance will not cover ODT Vitamin B12 prescription     They are requesting regular tablets that patient can swallow.  New med in order section if OK to change.    Please advise in Dr. Alonso abebe.    Thank you.    Chantale Walker   Ob/Gyn Clinic  RN

## 2023-02-24 ENCOUNTER — HOSPITAL ENCOUNTER (OUTPATIENT)
Dept: ULTRASOUND IMAGING | Facility: CLINIC | Age: 23
Discharge: HOME | End: 2023-02-24
Attending: OBSTETRICS & GYNECOLOGY | Admitting: OBSTETRICS & GYNECOLOGY
Payer: COMMERCIAL

## 2023-02-24 DIAGNOSIS — Z34.03 ENCOUNTER FOR PRENATAL CARE IN THIRD TRIMESTER OF FIRST PREGNANCY: ICD-10-CM

## 2023-02-24 PROCEDURE — 76819 FETAL BIOPHYS PROFIL W/O NST: CPT

## 2023-03-02 ENCOUNTER — ALLIED HEALTH/NURSE VISIT (OUTPATIENT)
Dept: OBGYN | Facility: CLINIC | Age: 23
End: 2023-03-02
Payer: COMMERCIAL

## 2023-03-02 ENCOUNTER — HOSPITAL ENCOUNTER (OUTPATIENT)
Dept: ULTRASOUND IMAGING | Facility: CLINIC | Age: 23
Discharge: HOME | End: 2023-03-02
Attending: OBSTETRICS & GYNECOLOGY | Admitting: OBSTETRICS & GYNECOLOGY
Payer: COMMERCIAL

## 2023-03-02 DIAGNOSIS — O10.919 HTN IN PREGNANCY, CHRONIC: Primary | ICD-10-CM

## 2023-03-02 DIAGNOSIS — Z34.03 ENCOUNTER FOR PRENATAL CARE IN THIRD TRIMESTER OF FIRST PREGNANCY: ICD-10-CM

## 2023-03-02 PROCEDURE — 76819 FETAL BIOPHYS PROFIL W/O NST: CPT

## 2023-03-02 PROCEDURE — 59025 FETAL NON-STRESS TEST: CPT

## 2023-03-02 NOTE — PROGRESS NOTES
Patient came into clinic for a Nurse Only NST appointment. Patient was placed on NST Monitor for 20 min.     Strip removed from machine and shown to      Verbal approval to take patient off NST and send patient home.     NST strip placed  in basket to be reviewed and documented. Routed epic chart to Dr. Davidson to document.     Ebonie Lanier cma

## 2023-03-03 ENCOUNTER — PRENATAL OFFICE VISIT (OUTPATIENT)
Dept: OBGYN | Facility: CLINIC | Age: 23
End: 2023-03-03
Payer: COMMERCIAL

## 2023-03-03 VITALS
DIASTOLIC BLOOD PRESSURE: 71 MMHG | BODY MASS INDEX: 43.95 KG/M2 | RESPIRATION RATE: 18 BRPM | TEMPERATURE: 97.1 F | WEIGHT: 280 LBS | SYSTOLIC BLOOD PRESSURE: 112 MMHG | HEART RATE: 105 BPM | HEIGHT: 67 IN

## 2023-03-03 DIAGNOSIS — O99.013 ANEMIA DURING PREGNANCY IN THIRD TRIMESTER: ICD-10-CM

## 2023-03-03 DIAGNOSIS — Z34.03 ENCOUNTER FOR PRENATAL CARE IN THIRD TRIMESTER OF FIRST PREGNANCY: Primary | ICD-10-CM

## 2023-03-03 DIAGNOSIS — O99.019 ANEMIA DURING PREGNANCY: ICD-10-CM

## 2023-03-03 DIAGNOSIS — O10.919 HTN IN PREGNANCY, CHRONIC: ICD-10-CM

## 2023-03-03 DIAGNOSIS — E03.9 HYPOTHYROIDISM, UNSPECIFIED TYPE: ICD-10-CM

## 2023-03-03 DIAGNOSIS — E66.01 MORBID OBESITY (H): ICD-10-CM

## 2023-03-03 PROCEDURE — 99207 PR PRENATAL VISIT: CPT | Performed by: STUDENT IN AN ORGANIZED HEALTH CARE EDUCATION/TRAINING PROGRAM

## 2023-03-03 NOTE — PROGRESS NOTES
NST:   Indication: Chronic hypertension, 6/8 BPP  Baseline: 130 bpm  Variability: moderate  Accelerations: present, up to 160, lasting at least 30 seconds  Decelerations: none  TOCO: none  Impression: Reactive NST    Plan routine follow up for scheduled BPPs and prenatal appointments.    Milka Davidson DO

## 2023-03-03 NOTE — NURSING NOTE
"Initial /71 (BP Location: Left arm, Patient Position: Chair, Cuff Size: Adult Large)   Pulse 105   Temp 97.1  F (36.2  C) (Tympanic)   Resp 18   Ht 1.702 m (5' 7\")   Wt 127 kg (280 lb)   LMP 06/06/2022   BMI 43.85 kg/m   Estimated body mass index is 43.85 kg/m  as calculated from the following:    Height as of this encounter: 1.702 m (5' 7\").    Weight as of this encounter: 127 kg (280 lb). .      "

## 2023-03-03 NOTE — PROGRESS NOTES
"[facility]  Return OB Note    Subjective:  Denies vaginal bleeding, loss of fluid, or regular contractions. Noted normal fetal movement.    Objective:  /71 (BP Location: Left arm, Patient Position: Chair, Cuff Size: Adult Large)   Pulse 105   Temp 97.1  F (36.2  C) (Tympanic)   Resp 18   Ht 1.702 m (5' 7\")   Wt 127 kg (280 lb)   LMP 2022   BMI 43.85 kg/m      Fundal height: 34cm  FHT: 120bpm    Assessment/Plan:   Samantha Cody is a 22 year old  at 34w3d by 7w2d US, here for return OB visit.    -NOB labs nl. Midtrimester labs noted for anemia.  -Genetic screen: NIPT -low risk  -Anatomy US WNL, repeat for missing views WNL  -S/p Tdap vaccine. Declined influenza and COVID-vaccine.    -cHTN: baseline preE labs nl. Continue ASA. L2 US nl. Continue Q4wk growth US and weekly BPP. IOL 76w3c-51p1m pending on BPs.  -Pregravid BMI 43: s/p nl L2 US.  -Hypothyroidism: continue levothyroxine 75 mcg daily. TSH on  nl. Plan to recheck with L&D admission labs.  -Mild iron deficiency anemia: continue iron supplement, vitamin C, and vitamin B12    RTC 2 weeks    Erica Maldonado MD  Obstetrics and Gynecology  [facility]   2023           "

## 2023-03-03 NOTE — PATIENT INSTRUCTIONS
36 weeks:   - 70-80g of Dates at 36 weeks  37 weeks  ? EVENING PRIMROSE  mg po TID until delivery  ? 38 weeks  ? Breast stimulation 15-20 minutes 3 times a day  ? Acupuncture with electro stim  ? Exercise 30 minutes, 3 times a week  ? Membrane sweep weekly /twice weekly/ q 48 hours  ? 39 weeks and beyond  ? Castor Oil 60ml in 200ml warm water (60 ml single dose in 200 ml warm water or orange juice)

## 2023-03-08 ENCOUNTER — HOSPITAL ENCOUNTER (OUTPATIENT)
Dept: ULTRASOUND IMAGING | Facility: CLINIC | Age: 23
Discharge: HOME | End: 2023-03-08
Attending: OBSTETRICS & GYNECOLOGY | Admitting: OBSTETRICS & GYNECOLOGY
Payer: COMMERCIAL

## 2023-03-08 DIAGNOSIS — Z34.03 ENCOUNTER FOR PRENATAL CARE IN THIRD TRIMESTER OF FIRST PREGNANCY: ICD-10-CM

## 2023-03-08 PROCEDURE — 76819 FETAL BIOPHYS PROFIL W/O NST: CPT

## 2023-03-13 ENCOUNTER — HOSPITAL ENCOUNTER (OUTPATIENT)
Facility: CLINIC | Age: 23
Discharge: HOME | End: 2023-03-14
Attending: EMERGENCY MEDICINE | Admitting: OBSTETRICS & GYNECOLOGY
Payer: COMMERCIAL
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
## 2023-03-13 DIAGNOSIS — Z3A.36 36 WEEKS GESTATION OF PREGNANCY: ICD-10-CM
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
## 2023-03-13 PROCEDURE — 59025 FETAL NON-STRESS TEST: CPT | Mod: 26 | Performed by: OBSTETRICS & GYNECOLOGY

## 2023-03-14 ENCOUNTER — ANCILLARY PROCEDURE (OUTPATIENT)
Dept: ULTRASOUND IMAGING | Facility: CLINIC | Age: 23
End: 2023-03-14
Attending: EMERGENCY MEDICINE
Payer: COMMERCIAL

## 2023-03-14 VITALS
HEART RATE: 90 BPM | DIASTOLIC BLOOD PRESSURE: 81 MMHG | TEMPERATURE: 97.5 F | HEIGHT: 67 IN | BODY MASS INDEX: 43.95 KG/M2 | RESPIRATION RATE: 16 BRPM | WEIGHT: 280 LBS | SYSTOLIC BLOOD PRESSURE: 126 MMHG | OXYGEN SATURATION: 99 %

## 2023-03-14 PROCEDURE — G0463 HOSPITAL OUTPT CLINIC VISIT: HCPCS | Mod: 25

## 2023-03-14 PROCEDURE — 59025 FETAL NON-STRESS TEST: CPT

## 2023-03-14 PROCEDURE — G0463 HOSPITAL OUTPT CLINIC VISIT: HCPCS

## 2023-03-14 ASSESSMENT — ENCOUNTER SYMPTOMS
ABDOMINAL PAIN: 0
SHORTNESS OF BREATH: 0
WEAKNESS: 0
BACK PAIN: 0
NUMBNESS: 0
LIGHT-HEADEDNESS: 0
APPETITE CHANGE: 0
HEADACHES: 0
WOUND: 0
NECK PAIN: 0

## 2023-03-14 ASSESSMENT — ACTIVITIES OF DAILY LIVING (ADL)
ADLS_ACUITY_SCORE: 35
ADLS_ACUITY_SCORE: 33

## 2023-03-14 NOTE — ED PROVIDER NOTES
History     Chief Complaint   Patient presents with     Fall     HPI  Samantha Cody is a 22 year old female who is approximately 36 weeks pregnant with her first pregnancy presenting for evaluation after a fall.  Patient reports she was walking on the sidewalk and slipped on the ice.  She slipped and fell onto her right knee and then onto her right sided abdomen.  Denies striking her head or loss of conscious.  Had some soreness initially but now reports no pain.  Fall occurred around 11 PM.  Denies any vaginal bleeding or discharge since the fall.  Has been feeling baby move per normal.  Denies any current abdominal pain or nausea.  Otherwise feels well but is anxious about the welfare of her child.    Allergies:  No Known Allergies    Problem List:    Patient Active Problem List    Diagnosis Date Noted     Prenatal care, first pregnancy 08/11/2022     Priority: Medium     FOB- Prash[facility]       Morbid obesity (H) 06/09/2022     Priority: Medium     Multiple joint pain 02/18/2015     Priority: Medium        Past Medical History:    Past Medical History:   Diagnosis Date     Hypothyroidism        Past Surgical History:    Past Surgical History:   Procedure Laterality Date     NO HISTORY OF SURGERY         Family History:    Family History   Problem Relation Age of Onset     Hypertension Mother      Hypertension Father      Thyroid Disease Paternal Grandmother      Gynecology Other         ovarian cancer age:25 -paternal aunt       Social History:  Marital Status:  Single [1]  Social History     Tobacco Use     Smoking status: Every Day     Types: Vaping Device     Smokeless tobacco: Never     Tobacco comments:     cutting down with pregnancy   Vaping Use     Vaping Use: Every day     Substances: Nicotine     Devices: Disposable   Substance Use Topics     Alcohol use: Not Currently     Drug use: No        Medications:    aspirin 81 MG EC tablet  cyanocobalamin (VITAMIN B-12) 1000 MCG tablet  cyanocobalamin  "1000 MCG sublingual tablet  ferrous sulfate (FEROSUL) 325 (65 Fe) MG tablet  levothyroxine (SYNTHROID/LEVOTHROID) 75 MCG tablet  Prenatal Vit-Fe Fumarate-FA (PRENATAL MULTIVITAMIN W/IRON) 27-0.8 MG tablet  terbinafine (LAMISIL) 1 % external cream  vitamin C (ASCORBIC ACID) 500 MG tablet          Review of Systems   Constitutional: Negative for appetite change.   Respiratory: Negative for shortness of breath.    Cardiovascular: Negative for chest pain.   Gastrointestinal: Negative for abdominal pain.   Musculoskeletal: Negative for back pain and neck pain.        Pain in right knee, mild   Skin: Negative for wound.   Neurological: Negative for weakness, light-headedness, numbness and headaches.   All other systems reviewed and are negative.      Physical Exam   BP: (!) 151/88  Pulse: 94  Temp: 97.7  F (36.5  C)  Height: 170.2 cm (5' 7\")  Weight: 127 kg (280 lb)  SpO2: 99 %      Physical Exam  Vitals and nursing note reviewed.   Constitutional:       Appearance: She is obese. She is not ill-appearing or diaphoretic.   HENT:      Head: Atraumatic.   Eyes:      Conjunctiva/sclera: Conjunctivae normal.   Cardiovascular:      Rate and Rhythm: Normal rate.      Pulses: Normal pulses.   Pulmonary:      Effort: Pulmonary effort is normal.   Abdominal:      Palpations: Abdomen is soft.      Tenderness: There is no abdominal tenderness. There is no guarding.      Comments: No visible bruising or outward signs of trauma   Musculoskeletal:         General: Normal range of motion.      Comments: Full range of motion of right knee without pain.   Skin:     General: Skin is warm and dry.      Capillary Refill: Capillary refill takes less than 2 seconds.   Neurological:      Mental Status: She is oriented to person, place, and time.   Psychiatric:         Mood and Affect: Mood normal.         ED Course                 Procedures             Results for orders placed or performed during the hospital encounter of 03/13/23 (from the " past 24 hour(s))   POC US ABDOMEN LIMITED    Impression    [facility] Procedure Note     Limited Bedside ED Pelvic Ultrasound (PREGNANT PATIENT):    PROCEDURE: PERFORMED BY: Dr. Thomas Jim MD  INDICATIONS/SYMPTOM: Fall  PROBE: Low frequency convex probe  Type of US Study: Transabdominal Exam  BODY LOCATION: Pelvis  FINDINGS: Fetal heart rate: Present and counted at 119 bpm.  INTERPRETATION: Normal pelvic ultrasound with intrauterine pregnancy present. FHR was 119 with noted fetal activity.  No pelvic free fluid was present. No adnexal abnormality noted.  IMAGE DOCUMENTATION: Images were archived to PACs system.         Medications - No data to display    Assessments & Plan (with Medical Decision Making)  22-year-old female who is 36 weeks pregnant presenting for evaluation of fall on ice.  Injury occurred about 11 PM.  She slipped onto her right sided knee and then onto her abdomen.  Not having any pain now other than some mild soreness of her right knee.  Has full range of motion the knee without difficulty.  Abdomen is soft and nontender on exam.  Bedside ultrasound showed slightly slowed fetal heart rate and fetal movement.  Patient not having any vaginal bleeding, discharge, or cramps.     I have reviewed the nursing notes.    I have reviewed the findings, diagnosis, plan and need for follow up with the patient.               New Prescriptions    No medications on file       Final diagnoses:   Fall, initial encounter   Fall due to slipping on ice or snow, initial encounter   36 weeks gestation of pregnancy       3/13/2023   [facility] EMERGENCY DEPT     Jim, Thomas Quesada MD  03/14/23 2613

## 2023-03-14 NOTE — PROGRESS NOTES
OB Triage Note  Samantha Cody  MRN: [id number]  Gestational Age: 36w0d      Samantha Cody presents for Fall at 2300 on the ice when walking into her house. She reports she landed on her right side and feels she hit the right side of her abdomen when she landed on the ice and snow.  (sign/symptom/concern).      Denies isabell.  Rates pain at 0/10.  Bleeding: No bleeding reported by pt.  Denies LOF.      Dr. Brian notified of arrival and condition.  Oriented patient to surroundings. Call light within reach.     FHT: 115  NST Start: 0220  NST Stop: 0250  NST: Reactive.  Uterine Assessment:Contractions: frequency q 2-3 minutes of not felt by patient and not palpable quality.    Plan:  -Initial NST, then fetal/uterine monitoring per MD/patient plan.    Verified with second RN: Stella Hernandez RN

## 2023-03-14 NOTE — DISCHARGE INSTRUCTIONS
Discharge Instructions for Undelivered Patients  [facility] Phone # [phone]    Diet:  * Drink 8 to 12 glasses of liquids (milk, juice, water) every day  * You may eat meals and snacks.    Activity:  * Count fetal kicks every day (see handout).  * Call your doctor or nurse midwife if your baby is moving less than usual.    Call your provider if you notice:  * Swelling in your face or increased swelling in your hands or legs.  * Headaches that are not relieved by Tylenol (acetaminophen).  * Changes in your vision (blurring; seeing spots or stars).  * Nausea (sick to your stomach) and vomiting (throwing up).  * Weight gain of 5 pounds per week.  * Heartburn that doesn't go away.  * Signs of bladder infection: Pain when you urinate (use the toilet), needing to go more often or more urgently.  * The bag of carranza (membrane) breaks, or you notice leaking in your underwear.  * Bright red blood in your underwear.  * Abdominal (lower belly) or stomach pain.  * For first baby: Contractions (tightenings) less than 5 minutes apart for one hour or more.  * Second (plus) baby: Contractions (tightenings) less than 10 minutes apart and getting stronger.  * Increase or change in vaginal discharge (note the color and amount).    Schedule follow up appointment with [facility] by calling [phone]

## 2023-03-14 NOTE — PROGRESS NOTES
"Samantha Cody is a 22 year old  and 36w0d patient came in complaining of Fall    Patient Active Problem List   Diagnosis     Multiple joint pain     Morbid obesity (H)     Prenatal care, first pregnancy       Pt discharged to home at 3:12 AM and encouraged to rest and drink plenty of fluids.  Pt told to call/return if bleeding more than spotting, water breaks, contractions 3-5 minutes apart that she has to breath through.     Nursing discharge education provided. Self-management instructions reviewed. AVS given and signed. All questions answered and patient verbalizes understanding.    BP (!) 151/88   Pulse 94   Temp 97.7  F (36.5  C) (Tympanic)   Ht 1.702 m (5' 7\")   Wt 127 kg (280 lb)   LMP 2022   SpO2 99%   BMI 43.85 kg/m      Cervical status: not examined  Fetal Assessment: Reactive    Discharge support:  Family/Friend Support    OB History    Para Term  AB Living   1 0 0 0 0 0   SAB IAB Ectopic Multiple Live Births   0 0 0 0 0      # Outcome Date GA Lbr Joshua/2nd Weight Sex Delivery Anes PTL Lv   1 Current                    "

## 2023-03-14 NOTE — ED TRIAGE NOTES
Patient reports she slipped and fell on ice ~ 30 minutes prior to arrival. Patient is 36 weeks pregnant. Feel onto right abdomen and knee. Feels baby moving. Wants baby checked out.      Triage Assessment     Row Name 03/13/23 2295       Triage Assessment (Adult)    Airway WDL WDL       Respiratory WDL    Respiratory WDL WDL       Skin Circulation/Temperature WDL    Skin Circulation/Temperature WDL WDL       Cardiac WDL    Cardiac WDL WDL       Peripheral/Neurovascular WDL    Peripheral Neurovascular WDL WDL       Cognitive/Neuro/Behavioral WDL    Cognitive/Neuro/Behavioral WDL WDL

## 2023-03-14 NOTE — ED NOTES
OB contacted. Patient needs to be monitored for ~ 4 hours. Request patient up to OB for monitoring after medical clearance.

## 2023-03-15 ENCOUNTER — HOSPITAL ENCOUNTER (OUTPATIENT)
Dept: ULTRASOUND IMAGING | Facility: CLINIC | Age: 23
Discharge: HOME | End: 2023-03-15
Attending: OBSTETRICS & GYNECOLOGY | Admitting: OBSTETRICS & GYNECOLOGY
Payer: COMMERCIAL

## 2023-03-15 ENCOUNTER — PRENATAL OFFICE VISIT (OUTPATIENT)
Dept: OBGYN | Facility: CLINIC | Age: 23
End: 2023-03-15
Payer: COMMERCIAL

## 2023-03-15 VITALS
HEART RATE: 108 BPM | RESPIRATION RATE: 20 BRPM | HEIGHT: 67 IN | DIASTOLIC BLOOD PRESSURE: 70 MMHG | TEMPERATURE: 96.9 F | BODY MASS INDEX: 43.62 KG/M2 | SYSTOLIC BLOOD PRESSURE: 121 MMHG | WEIGHT: 277.9 LBS

## 2023-03-15 DIAGNOSIS — Z34.03 ENCOUNTER FOR PRENATAL CARE IN THIRD TRIMESTER OF FIRST PREGNANCY: Primary | ICD-10-CM

## 2023-03-15 DIAGNOSIS — Z34.03 ENCOUNTER FOR PRENATAL CARE IN THIRD TRIMESTER OF FIRST PREGNANCY: ICD-10-CM

## 2023-03-15 PROCEDURE — 99207 PR PRENATAL VISIT: CPT | Performed by: OBSTETRICS & GYNECOLOGY

## 2023-03-15 PROCEDURE — 87653 STREP B DNA AMP PROBE: CPT | Performed by: OBSTETRICS & GYNECOLOGY

## 2023-03-15 PROCEDURE — 76819 FETAL BIOPHYS PROFIL W/O NST: CPT

## 2023-03-15 NOTE — PROGRESS NOTES
Concerns: BPP 8/8  Interval growth @ 51%ile today  .  Doing well.  No concerns today.  No vaginal bleeding, LOF, contractions.  No HA, RUQ pain, N/V, visual changes.  Cephalic position confirmed by Leopold maneuvers and ultrasound.  Discussed signs of labor and when to call or come in.  Reportable signs and symptoms discussed.  GBS done today.  Labor precautions discussed.  RTC 1 week.  Prenatal flowsheet information is reviewed.    Duke Brian MD

## 2023-03-16 LAB — GP B STREP DNA SPEC QL NAA+PROBE: NEGATIVE

## 2023-03-22 ENCOUNTER — PRENATAL OFFICE VISIT (OUTPATIENT)
Dept: OBGYN | Facility: CLINIC | Age: 23
End: 2023-03-22
Payer: COMMERCIAL

## 2023-03-22 ENCOUNTER — HOSPITAL ENCOUNTER (OUTPATIENT)
Dept: ULTRASOUND IMAGING | Facility: CLINIC | Age: 23
Discharge: HOME | End: 2023-03-22
Attending: OBSTETRICS & GYNECOLOGY | Admitting: OBSTETRICS & GYNECOLOGY
Payer: COMMERCIAL

## 2023-03-22 VITALS
WEIGHT: 281.3 LBS | HEART RATE: 104 BPM | SYSTOLIC BLOOD PRESSURE: 115 MMHG | DIASTOLIC BLOOD PRESSURE: 74 MMHG | RESPIRATION RATE: 16 BRPM | HEIGHT: 67 IN | BODY MASS INDEX: 44.15 KG/M2 | TEMPERATURE: 97 F

## 2023-03-22 DIAGNOSIS — Z34.03 ENCOUNTER FOR PRENATAL CARE IN THIRD TRIMESTER OF FIRST PREGNANCY: ICD-10-CM

## 2023-03-22 DIAGNOSIS — Z34.03 ENCOUNTER FOR PRENATAL CARE IN THIRD TRIMESTER OF FIRST PREGNANCY: Primary | ICD-10-CM

## 2023-03-22 PROCEDURE — 76819 FETAL BIOPHYS PROFIL W/O NST: CPT

## 2023-03-22 PROCEDURE — 99207 PR PRENATAL VISIT: CPT | Performed by: OBSTETRICS & GYNECOLOGY

## 2023-03-22 RX ORDER — MISOPROSTOL 100 UG/1
25 TABLET ORAL
Status: CANCELLED | OUTPATIENT
Start: 2023-03-22

## 2023-03-22 RX ORDER — CITRIC ACID/SODIUM CITRATE 334-500MG
30 SOLUTION, ORAL ORAL
Status: CANCELLED | OUTPATIENT
Start: 2023-03-22

## 2023-03-22 RX ORDER — NALOXONE HYDROCHLORIDE 0.4 MG/ML
0.4 INJECTION, SOLUTION INTRAMUSCULAR; INTRAVENOUS; SUBCUTANEOUS
Status: CANCELLED | OUTPATIENT
Start: 2023-03-22

## 2023-03-22 RX ORDER — TRANEXAMIC ACID 10 MG/ML
1 INJECTION, SOLUTION INTRAVENOUS EVERY 30 MIN PRN
Status: CANCELLED | OUTPATIENT
Start: 2023-03-22

## 2023-03-22 RX ORDER — CARBOPROST TROMETHAMINE 250 UG/ML
250 INJECTION, SOLUTION INTRAMUSCULAR
Status: CANCELLED | OUTPATIENT
Start: 2023-03-22

## 2023-03-22 RX ORDER — OXYTOCIN/0.9 % SODIUM CHLORIDE 30/500 ML
100-340 PLASTIC BAG, INJECTION (ML) INTRAVENOUS CONTINUOUS PRN
Status: CANCELLED | OUTPATIENT
Start: 2023-03-22

## 2023-03-22 RX ORDER — PROCHLORPERAZINE MALEATE 10 MG
10 TABLET ORAL EVERY 6 HOURS PRN
Status: CANCELLED | OUTPATIENT
Start: 2023-03-22

## 2023-03-22 RX ORDER — METOCLOPRAMIDE HYDROCHLORIDE 5 MG/ML
10 INJECTION INTRAMUSCULAR; INTRAVENOUS EVERY 6 HOURS PRN
Status: CANCELLED | OUTPATIENT
Start: 2023-03-22

## 2023-03-22 RX ORDER — PROCHLORPERAZINE 25 MG
25 SUPPOSITORY, RECTAL RECTAL EVERY 12 HOURS PRN
Status: CANCELLED | OUTPATIENT
Start: 2023-03-22

## 2023-03-22 RX ORDER — MISOPROSTOL 200 UG/1
400 TABLET ORAL
Status: CANCELLED | OUTPATIENT
Start: 2023-03-22

## 2023-03-22 RX ORDER — FENTANYL CITRATE 50 UG/ML
100 INJECTION, SOLUTION INTRAMUSCULAR; INTRAVENOUS
Status: CANCELLED | OUTPATIENT
Start: 2023-03-22

## 2023-03-22 RX ORDER — MISOPROSTOL 200 UG/1
800 TABLET ORAL
Status: CANCELLED | OUTPATIENT
Start: 2023-03-22

## 2023-03-22 RX ORDER — KETOROLAC TROMETHAMINE 30 MG/ML
30 INJECTION, SOLUTION INTRAMUSCULAR; INTRAVENOUS
Status: CANCELLED | OUTPATIENT
Start: 2023-03-22 | End: 2023-03-27

## 2023-03-22 RX ORDER — OXYTOCIN 10 [USP'U]/ML
10 INJECTION, SOLUTION INTRAMUSCULAR; INTRAVENOUS
Status: CANCELLED | OUTPATIENT
Start: 2023-03-22

## 2023-03-22 RX ORDER — IBUPROFEN 200 MG
800 TABLET ORAL
Status: CANCELLED | OUTPATIENT
Start: 2023-03-22 | End: 2023-03-27

## 2023-03-22 RX ORDER — ACETAMINOPHEN 325 MG/1
650 TABLET ORAL EVERY 4 HOURS PRN
Status: CANCELLED | OUTPATIENT
Start: 2023-03-22

## 2023-03-22 RX ORDER — METHYLERGONOVINE MALEATE 0.2 MG/ML
200 INJECTION INTRAVENOUS
Status: CANCELLED | OUTPATIENT
Start: 2023-03-22

## 2023-03-22 RX ORDER — ONDANSETRON 2 MG/ML
4 INJECTION INTRAMUSCULAR; INTRAVENOUS EVERY 6 HOURS PRN
Status: CANCELLED | OUTPATIENT
Start: 2023-03-22

## 2023-03-22 RX ORDER — METOCLOPRAMIDE 10 MG/1
10 TABLET ORAL EVERY 6 HOURS PRN
Status: CANCELLED | OUTPATIENT
Start: 2023-03-22

## 2023-03-22 RX ORDER — OXYCODONE HYDROCHLORIDE 5 MG/1
5 TABLET ORAL
Status: CANCELLED | OUTPATIENT
Start: 2023-03-22

## 2023-03-22 RX ORDER — NALOXONE HYDROCHLORIDE 0.4 MG/ML
0.2 INJECTION, SOLUTION INTRAMUSCULAR; INTRAVENOUS; SUBCUTANEOUS
Status: CANCELLED | OUTPATIENT
Start: 2023-03-22

## 2023-03-22 RX ORDER — HYDROXYZINE HYDROCHLORIDE 50 MG/1
50 TABLET, FILM COATED ORAL
Status: CANCELLED | OUTPATIENT
Start: 2023-03-22

## 2023-03-22 RX ORDER — MORPHINE SULFATE 10 MG/ML
10 INJECTION, SOLUTION INTRAMUSCULAR; INTRAVENOUS
Status: CANCELLED | OUTPATIENT
Start: 2023-03-22

## 2023-03-22 RX ORDER — ONDANSETRON 4 MG/1
4 TABLET, ORALLY DISINTEGRATING ORAL EVERY 6 HOURS PRN
Status: CANCELLED | OUTPATIENT
Start: 2023-03-22

## 2023-03-22 RX ORDER — OXYTOCIN/0.9 % SODIUM CHLORIDE 30/500 ML
340 PLASTIC BAG, INJECTION (ML) INTRAVENOUS CONTINUOUS PRN
Status: CANCELLED | OUTPATIENT
Start: 2023-03-22

## 2023-03-22 NOTE — PROGRESS NOTES
"[facility]  Return OB Note     Subjective:  Denies vaginal bleeding, loss of fluid, or regular contractions. Noted normal fetal movement.     Objective:  /74 (BP Location: Left arm, Patient Position: Chair, Cuff Size: Adult Large)   Pulse 104   Temp 97  F (36.1  C) (Tympanic)   Resp 16   Ht 1.702 m (5' 7\")   Wt 127.6 kg (281 lb 4.8 oz)   LMP 2022   BMI 44.06 kg/m      Fundal height: 37cm  FHT: 130bpm     Assessment/Plan:   Samantha Cody is a 22 year old  at 37w1d by 7w2d US, here for return OB visit.     -NOB labs nl. Midtrimester labs noted for anemia.  -Genetic screen: NIPT -low risk  -Anatomy US WNL, repeat for missing views WNL  -S/p Tdap vaccine. Declined influenza and COVID-vaccine.    -cHTN: baseline preE labs nl. Continue ASA. L2 US nl. Continue Q4wk growth US and weekly BPP. IOL [id number] pending on BPs. Wants IOL ASAP.   -Pregravid BMI 43: s/p nl L2 US.  -Hypothyroidism: continue levothyroxine 75 mcg daily. TSH on  nl.   -Mild iron deficiency anemia: continue iron supplement, vitamin C, and vitamin B12  -Discontinue ASA for cHTN. GBS negative. BPP today . Return precautions given. Induction scheduled for 38wks.      Return for induction 3/27 at 7AM. Instructions given. Discussed steps of cervical ripening and then pitocin once favorable.     Lesly Gordon NP Student on 3/22/2023 at 1:38 PM    I agree with the medical student's documentation above and have edited it for accuracy. I was present for and performed the physical exam and interview of the patient myself. All medical decision-making was performed by me. If a procedure was performed, that was performed by me. Additionally, if billing is based on time, I am only using the time that I personally spent with the patient in my time statement.     Angela Steele MD  OB/GYN     "

## 2023-03-22 NOTE — PROGRESS NOTES
"Initial /74 (BP Location: Left arm, Patient Position: Chair, Cuff Size: Adult Large)   Pulse 104   Temp 97  F (36.1  C) (Tympanic)   Resp 16   Ht 1.702 m (5' 7\")   Wt 127.6 kg (281 lb 4.8 oz)   LMP 06/06/2022   BMI 44.06 kg/m   Estimated body mass index is 44.06 kg/m  as calculated from the following:    Height as of this encounter: 1.702 m (5' 7\").    Weight as of this encounter: 127.6 kg (281 lb 4.8 oz). .      "

## 2023-03-27 ENCOUNTER — HOSPITAL ENCOUNTER (INPATIENT)
Facility: CLINIC | Age: 23
LOS: 1 days | Discharge: HOME | DRG: 833 | End: 2023-03-28
Attending: OBSTETRICS & GYNECOLOGY | Admitting: OBSTETRICS & GYNECOLOGY
Payer: COMMERCIAL

## 2023-03-27 PROBLEM — Z34.90 ENCOUNTER FOR PLANNED INDUCTION OF LABOR: Status: ACTIVE | Noted: 2023-03-27

## 2023-03-27 LAB
ABO/RH(D): NORMAL
ALBUMIN SERPL BCG-MCNC: 3.3 G/DL (ref 3.5–5.2)
ALP SERPL-CCNC: 135 U/L (ref 35–104)
ALT SERPL W P-5'-P-CCNC: 12 U/L (ref 10–35)
ANION GAP SERPL CALCULATED.3IONS-SCNC: 11 MMOL/L (ref 7–15)
ANTIBODY SCREEN: NEGATIVE
AST SERPL W P-5'-P-CCNC: 12 U/L (ref 10–35)
BILIRUB SERPL-MCNC: 0.2 MG/DL
BUN SERPL-MCNC: 5.8 MG/DL (ref 6–20)
CALCIUM SERPL-MCNC: 9.2 MG/DL (ref 8.6–10)
CHLORIDE SERPL-SCNC: 103 MMOL/L (ref 98–107)
CREAT SERPL-MCNC: 0.52 MG/DL (ref 0.51–0.95)
DEPRECATED HCO3 PLAS-SCNC: 21 MMOL/L (ref 22–29)
ERYTHROCYTE [DISTWIDTH] IN BLOOD BY AUTOMATED COUNT: 15.9 % (ref 10–15)
GFR SERPL CREATININE-BSD FRML MDRD: >90 ML/MIN/1.73M2
GLUCOSE SERPL-MCNC: 90 MG/DL (ref 70–99)
HCT VFR BLD AUTO: 32.8 % (ref 35–47)
HGB BLD-MCNC: 10.6 G/DL (ref 11.7–15.7)
MCH RBC QN AUTO: 25.7 PG (ref 26.5–33)
MCHC RBC AUTO-ENTMCNC: 32.3 G/DL (ref 31.5–36.5)
MCV RBC AUTO: 80 FL (ref 78–100)
PLATELET # BLD AUTO: 180 10E3/UL (ref 150–450)
POTASSIUM SERPL-SCNC: 3.9 MMOL/L (ref 3.4–5.3)
PROT SERPL-MCNC: 6.5 G/DL (ref 6.4–8.3)
RBC # BLD AUTO: 4.12 10E6/UL (ref 3.8–5.2)
SODIUM SERPL-SCNC: 135 MMOL/L (ref 136–145)
SPECIMEN EXPIRATION DATE: NORMAL
T PALLIDUM AB SER QL: NONREACTIVE
WBC # BLD AUTO: 8.7 10E3/UL (ref 4–11)

## 2023-03-27 PROCEDURE — 86780 TREPONEMA PALLIDUM: CPT | Performed by: OBSTETRICS & GYNECOLOGY

## 2023-03-27 PROCEDURE — 80053 COMPREHEN METABOLIC PANEL: CPT | Performed by: OBSTETRICS & GYNECOLOGY

## 2023-03-27 PROCEDURE — 120N000001 HC R&B MED SURG/OB

## 2023-03-27 PROCEDURE — 3E0P7VZ INTRODUCTION OF HORMONE INTO FEMALE REPRODUCTIVE, VIA NATURAL OR ARTIFICIAL OPENING: ICD-10-PCS | Performed by: OBSTETRICS & GYNECOLOGY

## 2023-03-27 PROCEDURE — 85027 COMPLETE CBC AUTOMATED: CPT | Performed by: OBSTETRICS & GYNECOLOGY

## 2023-03-27 PROCEDURE — 250N000013 HC RX MED GY IP 250 OP 250 PS 637: Performed by: OBSTETRICS & GYNECOLOGY

## 2023-03-27 PROCEDURE — 86850 RBC ANTIBODY SCREEN: CPT | Performed by: OBSTETRICS & GYNECOLOGY

## 2023-03-27 RX ORDER — METOCLOPRAMIDE HYDROCHLORIDE 5 MG/ML
10 INJECTION INTRAMUSCULAR; INTRAVENOUS EVERY 6 HOURS PRN
Status: DISCONTINUED | OUTPATIENT
Start: 2023-03-27 | End: 2023-03-29 | Stop reason: HOSPADM

## 2023-03-27 RX ORDER — NALOXONE HYDROCHLORIDE 0.4 MG/ML
0.2 INJECTION, SOLUTION INTRAMUSCULAR; INTRAVENOUS; SUBCUTANEOUS
Status: DISCONTINUED | OUTPATIENT
Start: 2023-03-27 | End: 2023-03-29 | Stop reason: HOSPADM

## 2023-03-27 RX ORDER — CITRIC ACID/SODIUM CITRATE 334-500MG
30 SOLUTION, ORAL ORAL
Status: DISCONTINUED | OUTPATIENT
Start: 2023-03-27 | End: 2023-03-29 | Stop reason: HOSPADM

## 2023-03-27 RX ORDER — PROCHLORPERAZINE 25 MG
25 SUPPOSITORY, RECTAL RECTAL EVERY 12 HOURS PRN
Status: DISCONTINUED | OUTPATIENT
Start: 2023-03-27 | End: 2023-03-29 | Stop reason: HOSPADM

## 2023-03-27 RX ORDER — ONDANSETRON 4 MG/1
4 TABLET, ORALLY DISINTEGRATING ORAL EVERY 6 HOURS PRN
Status: DISCONTINUED | OUTPATIENT
Start: 2023-03-27 | End: 2023-03-29 | Stop reason: HOSPADM

## 2023-03-27 RX ORDER — NALOXONE HYDROCHLORIDE 0.4 MG/ML
0.4 INJECTION, SOLUTION INTRAMUSCULAR; INTRAVENOUS; SUBCUTANEOUS
Status: DISCONTINUED | OUTPATIENT
Start: 2023-03-27 | End: 2023-03-29 | Stop reason: HOSPADM

## 2023-03-27 RX ORDER — TRANEXAMIC ACID 10 MG/ML
1 INJECTION, SOLUTION INTRAVENOUS EVERY 30 MIN PRN
Status: DISCONTINUED | OUTPATIENT
Start: 2023-03-27 | End: 2023-03-29 | Stop reason: HOSPADM

## 2023-03-27 RX ORDER — FENTANYL CITRATE 50 UG/ML
100 INJECTION, SOLUTION INTRAMUSCULAR; INTRAVENOUS
Status: DISCONTINUED | OUTPATIENT
Start: 2023-03-27 | End: 2023-03-29 | Stop reason: HOSPADM

## 2023-03-27 RX ORDER — HYDROXYZINE HYDROCHLORIDE 50 MG/1
50 TABLET, FILM COATED ORAL
Status: DISCONTINUED | OUTPATIENT
Start: 2023-03-27 | End: 2023-03-29 | Stop reason: HOSPADM

## 2023-03-27 RX ORDER — OXYTOCIN 10 [USP'U]/ML
10 INJECTION, SOLUTION INTRAMUSCULAR; INTRAVENOUS
Status: DISCONTINUED | OUTPATIENT
Start: 2023-03-27 | End: 2023-03-29 | Stop reason: HOSPADM

## 2023-03-27 RX ORDER — PROCHLORPERAZINE MALEATE 10 MG
10 TABLET ORAL EVERY 6 HOURS PRN
Status: DISCONTINUED | OUTPATIENT
Start: 2023-03-27 | End: 2023-03-29 | Stop reason: HOSPADM

## 2023-03-27 RX ORDER — METHYLERGONOVINE MALEATE 0.2 MG/ML
200 INJECTION INTRAVENOUS
Status: DISCONTINUED | OUTPATIENT
Start: 2023-03-27 | End: 2023-03-29 | Stop reason: HOSPADM

## 2023-03-27 RX ORDER — CARBOPROST TROMETHAMINE 250 UG/ML
250 INJECTION, SOLUTION INTRAMUSCULAR
Status: DISCONTINUED | OUTPATIENT
Start: 2023-03-27 | End: 2023-03-29 | Stop reason: HOSPADM

## 2023-03-27 RX ORDER — PRENATAL VIT/IRON FUM/FOLIC AC 27MG-0.8MG
1 TABLET ORAL DAILY
Status: DISCONTINUED | OUTPATIENT
Start: 2023-03-27 | End: 2023-03-29 | Stop reason: HOSPADM

## 2023-03-27 RX ORDER — ACETAMINOPHEN 325 MG/1
650 TABLET ORAL EVERY 4 HOURS PRN
Status: DISCONTINUED | OUTPATIENT
Start: 2023-03-27 | End: 2023-03-29 | Stop reason: HOSPADM

## 2023-03-27 RX ORDER — MISOPROSTOL 100 UG/1
25 TABLET ORAL
Status: COMPLETED | OUTPATIENT
Start: 2023-03-27 | End: 2023-03-28

## 2023-03-27 RX ORDER — ONDANSETRON 2 MG/ML
4 INJECTION INTRAMUSCULAR; INTRAVENOUS EVERY 6 HOURS PRN
Status: DISCONTINUED | OUTPATIENT
Start: 2023-03-27 | End: 2023-03-29 | Stop reason: HOSPADM

## 2023-03-27 RX ORDER — METOCLOPRAMIDE 10 MG/1
10 TABLET ORAL EVERY 6 HOURS PRN
Status: DISCONTINUED | OUTPATIENT
Start: 2023-03-27 | End: 2023-03-29 | Stop reason: HOSPADM

## 2023-03-27 RX ORDER — MISOPROSTOL 200 UG/1
400 TABLET ORAL
Status: DISCONTINUED | OUTPATIENT
Start: 2023-03-27 | End: 2023-03-29 | Stop reason: HOSPADM

## 2023-03-27 RX ORDER — MISOPROSTOL 200 UG/1
800 TABLET ORAL
Status: DISCONTINUED | OUTPATIENT
Start: 2023-03-27 | End: 2023-03-29 | Stop reason: HOSPADM

## 2023-03-27 RX ORDER — MORPHINE SULFATE 10 MG/ML
10 INJECTION, SOLUTION INTRAMUSCULAR; INTRAVENOUS
Status: DISCONTINUED | OUTPATIENT
Start: 2023-03-27 | End: 2023-03-29 | Stop reason: HOSPADM

## 2023-03-27 RX ORDER — LEVOTHYROXINE SODIUM 75 UG/1
75 TABLET ORAL DAILY
Status: DISCONTINUED | OUTPATIENT
Start: 2023-03-27 | End: 2023-03-29 | Stop reason: HOSPADM

## 2023-03-27 RX ORDER — OXYTOCIN/0.9 % SODIUM CHLORIDE 30/500 ML
340 PLASTIC BAG, INJECTION (ML) INTRAVENOUS CONTINUOUS PRN
Status: DISCONTINUED | OUTPATIENT
Start: 2023-03-27 | End: 2023-03-29 | Stop reason: HOSPADM

## 2023-03-27 RX ADMIN — MISOPROSTOL 25 MCG: 100 TABLET ORAL at 16:59

## 2023-03-27 RX ADMIN — MISOPROSTOL 25 MCG: 100 TABLET ORAL at 08:51

## 2023-03-27 RX ADMIN — MISOPROSTOL 25 MCG: 100 TABLET ORAL at 19:00

## 2023-03-27 RX ADMIN — MISOPROSTOL 25 MCG: 100 TABLET ORAL at 10:56

## 2023-03-27 RX ADMIN — MISOPROSTOL 25 MCG: 100 TABLET ORAL at 23:02

## 2023-03-27 RX ADMIN — MISOPROSTOL 25 MCG: 100 TABLET ORAL at 12:56

## 2023-03-27 RX ADMIN — MISOPROSTOL 25 MCG: 100 TABLET ORAL at 21:00

## 2023-03-27 RX ADMIN — MISOPROSTOL 25 MCG: 100 TABLET ORAL at 15:01

## 2023-03-27 ASSESSMENT — ACTIVITIES OF DAILY LIVING (ADL)
ADLS_ACUITY_SCORE: 18
DOING_ERRANDS_INDEPENDENTLY_DIFFICULTY: NO
WALKING_OR_CLIMBING_STAIRS_DIFFICULTY: NO
TOILETING_ISSUES: NO
ADLS_ACUITY_SCORE: 18
DIFFICULTY_EATING/SWALLOWING: NO
CONCENTRATING,_REMEMBERING_OR_MAKING_DECISIONS_DIFFICULTY: NO
FALL_HISTORY_WITHIN_LAST_SIX_MONTHS: YES
ADLS_ACUITY_SCORE: 18
DRESSING/BATHING_DIFFICULTY: NO
ADLS_ACUITY_SCORE: 18
WEAR_GLASSES_OR_BLIND: NO
CHANGE_IN_FUNCTIONAL_STATUS_SINCE_ONSET_OF_CURRENT_ILLNESS/INJURY: NO
NUMBER_OF_TIMES_PATIENT_HAS_FALLEN_WITHIN_LAST_SIX_MONTHS: 1
ADLS_ACUITY_SCORE: 18

## 2023-03-27 NOTE — PROGRESS NOTES
Pt arrived to OB unit for scheduled cervical ripening followed by induction d/t CHN.  Will follow orders per protocol.

## 2023-03-27 NOTE — H&P
[facility] OB/GYN Department    History and Physical Note    Samantha Cody  2000  [id number]    HPI: Samantha Cody is a 22 year old  at 37w6d by 7w2d US, who presents for scheduled induction of labor for chronic hyperension. Reports good fetal movement. No regular contractions, no loss of fluid, no vaginal bleeding.     Pregnancy notable for:  CHTN  Morbid obesity  Hypothyroidism    OBHX:   OB History    Para Term  AB Living   1 0 0 0 0 0   SAB IAB Ectopic Multiple Live Births   0 0 0 0 0      # Outcome Date GA Lbr Joshua/2nd Weight Sex Delivery Anes PTL Lv   1 Current                MedicalHX:   Past Medical History:   Diagnosis Date     Hypothyroidism        SurgicalHX:   Past Surgical History:   Procedure Laterality Date     NO HISTORY OF SURGERY         Medications:   No current facility-administered medications on file prior to encounter.  aspirin 81 MG EC tablet, Take 81 mg by mouth daily  cyanocobalamin (VITAMIN B-12) 1000 MCG tablet, Take 1 tablet (1,000 mcg) by mouth daily  ferrous sulfate (FEROSUL) 325 (65 Fe) MG tablet, Take 1 tablet (325 mg) by mouth daily (with breakfast)  levothyroxine (SYNTHROID/LEVOTHROID) 75 MCG tablet, Take 1 tablet (75 mcg) by mouth daily  Prenatal Vit-Fe Fumarate-FA (PRENATAL MULTIVITAMIN W/IRON) 27-0.8 MG tablet, Take 1 tablet by mouth daily  terbinafine (LAMISIL) 1 % external cream, Apply topically 2 times daily (Patient not taking: Reported on 2/3/2023)  vitamin C (ASCORBIC ACID) 500 MG tablet, Take 1 tablet (500 mg) by mouth daily        Allergies:  No Known Allergies    FamilyHX:  Family History   Problem Relation Age of Onset     Hypertension Mother      Hypertension Father      Thyroid Disease Paternal Grandmother      Gynecology Other         ovarian cancer age:25 -paternal aunt       SocialHX:   Social History     Socioeconomic History     Marital status: Single   Tobacco Use     Smoking status: Every Day     Types: Vaping  Device     Smokeless tobacco: Never     Tobacco comments:     cutting down with pregnancy   Vaping Use     Vaping Use: Every day     Substances: Nicotine     Devices: Disposable   Substance and Sexual Activity     Alcohol use: Not Currently     Drug use: No     Sexual activity: Yes     Partners: Male       ROS: 10-point ROS negative except as in HPI     Physical Exam:  Vitals:    23 0721 23 1100   BP: 128/80 134/76   BP Location: Right arm Right arm   Patient Position: Semi-Spain's Semi-Spain's   Cuff Size: Adult Regular    Pulse: 95 79   Resp: 16 16   Temp: 98.2  F (36.8  C) 98.1  F (36.7  C)   TempSrc: Oral Oral     GEN: AOA x3  CV: No heaves or thrills. No peripheral varicosities  PULM: Equal expansion bilaterally, no accessory muscle use  ABD: soft, gravid, non-tender, non-distended  EXT: minimal edema, non-tender to palpation  SVE: 1.5/30/-3 per RN  Presentation: cephalic by SVE  Membranes: intact    NST:  FHT: baseline 130 bpm, moderate variability, + accelerations, no decelerations   TOCO: rare contraction    Labs:        Lab Results   Component Value Date    AS Negative 2023    HEPBANG Nonreactive 2022    HGB 10.6 (L) 2023       GBS Status: negative        A/P: Samantha Cody is a 22 year old female  at 37w6d, here for IOL for chronic hypertension.    Admit to L&D. Place PIV. Admission labs.   Labor: Will need cervical ripening, orders for Misoprostol. Discussed induction process and cervical ripening as well as timeframe. She is being induced in early induction window due to patient request.   FHT: Category 1 FHT.  Continue EFM and toco.  Pain: Analgesia PRN  GBS:   Negative    Hypothyroid: continue home dose Synthroid  Morbid obesity: encourage ambulation, considered Lovenox while admitted post partum if need for C section  CHTN: no signs of superimposed preeclampsia, no home medications    Milka Davidson DO

## 2023-03-27 NOTE — PLAN OF CARE
Patient receiving miso every 2 hours.  Very mild discomfort with contractions.  Ambulating in room, encouraging ambulation and movement in hallway.  Yesica DYER.

## 2023-03-28 VITALS
DIASTOLIC BLOOD PRESSURE: 82 MMHG | TEMPERATURE: 97.5 F | SYSTOLIC BLOOD PRESSURE: 137 MMHG | HEART RATE: 73 BPM | OXYGEN SATURATION: 95 % | RESPIRATION RATE: 16 BRPM

## 2023-03-28 PROCEDURE — 250N000013 HC RX MED GY IP 250 OP 250 PS 637: Performed by: OBSTETRICS & GYNECOLOGY

## 2023-03-28 RX ADMIN — MISOPROSTOL 25 MCG: 100 TABLET ORAL at 03:03

## 2023-03-28 RX ADMIN — PRENATAL VITAMINS-IRON FUMARATE 27 MG IRON-FOLIC ACID 0.8 MG TABLET 1 TABLET: at 08:55

## 2023-03-28 RX ADMIN — MISOPROSTOL 25 MCG: 100 TABLET ORAL at 05:02

## 2023-03-28 RX ADMIN — MISOPROSTOL 25 MCG: 100 TABLET ORAL at 01:01

## 2023-03-28 RX ADMIN — DINOPROSTONE 10 MG: 10 INSERT VAGINAL at 10:46

## 2023-03-28 RX ADMIN — MISOPROSTOL 25 MCG: 100 TABLET ORAL at 07:14

## 2023-03-28 RX ADMIN — LEVOTHYROXINE SODIUM 75 MCG: 75 TABLET ORAL at 07:14

## 2023-03-28 ASSESSMENT — ACTIVITIES OF DAILY LIVING (ADL)
ADLS_ACUITY_SCORE: 18

## 2023-03-28 NOTE — PROGRESS NOTES
Cervidil placed around 11am.  Vitals stable. Patient c/o mild pain with contractions, 3/10.  Patient up independently.  C/o nausea earlier but declined anti-emetic.  Encouraging patient to ambulate frequently.  Tolerating regular diet without difficulty.    Cervidil to be removed around 2300 tonight.

## 2023-03-28 NOTE — PROGRESS NOTES
"  3/28/2023         S: Pt comfortable, feeling contractions with misoprostol, denies rupture of membranes, no bleeding, baby is active.  No headache, visual disturbance or RUQ pain.    O: Vital signs:  Temp: 97.9  F (36.6  C) Temp src: Oral BP: 122/67 Pulse: 73   Resp: 16 SpO2: 100 % O2 Device: None (Room air)        Estimated body mass index is 44.06 kg/m  as calculated from the following:    Height as of 3/22/23: 1.702 m (5' 7\").    Weight as of 3/22/23: 127.6 kg (281 lb 4.8 oz).         A&O    SVE:/-2    FHTs: 120s with moderate variability, positive accels  [place]: q 1- 3min    A/P 22 year old  at 38w0d  Chronic hypertension, controlled, not on medications.  No signs or symptoms of preeclampsia.    O POS  Category 1 tracing  Induction for chronic hypertension, controlled, not on medication.  S/p misoprostol.  Cardona score of 4.  Discussed options of vaginal misoprostol, cervidil, james ball ripening or holding induction and return in a few days to a week.  After discussion, she decided to try cervidil and if she has no change, will consider delaying induction.    Nadege Floyd MD ....................  3/28/2023    8:48 AM    "

## 2023-03-28 NOTE — PLAN OF CARE
Vaginal exam per physician - nicole score 4.  After discussing options - patient opted to try cervadil, will place at 11am.  Monitors removed so patient may shower/walk outside.  Questions answered.

## 2023-03-28 NOTE — PROGRESS NOTES
VSS, miso q2, rates cxt 3/10 pain, Cat 1 FHT. Pt able to rest well throughout the night.       Report given to RADHA Man. All questions answered at this time.

## 2023-03-29 ENCOUNTER — PATIENT OUTREACH (OUTPATIENT)
Dept: CARE COORDINATION | Facility: CLINIC | Age: 23
End: 2023-03-29
Payer: COMMERCIAL

## 2023-03-29 NOTE — PROGRESS NOTES
1900 - pt ambulating [place]  2000 - RN to room. edu on POC, pt in agreement.   2130 - RN called to room. Pt stated cervidil may have fallen out. SVE performed, 2/30/-2, no cervidil found. MD updated.   2140 - MD to room to discuss POC. Options given, pt decided to go home and reschedule induction.     IV removed, all belongings packed, all questions answered at this time. Unit and triage number given for any further questions.    Pt discharged home, via ambulatory.

## 2023-03-29 NOTE — DISCHARGE SUMMARY
Admit date: 3/27/2023     Discharge date: 3/28/2023     Admit Dx:   - 22 year old y/o   at 38w0d   - history of chronic hypertension, hypothyroid and BMI >40.    Discharge Dx:  - Same as above,       Procedures:  - induction of labor      Admit HPI:  Ms. Samantha Cody is a 22 year old  at 38w0d  who was admitted on 3/27/2023 for induction of labor.  Please see her admit H&P for full details of her PMH, PSH, Meds, Allergies and exam on admit.    Hospital summary:  She was given 24 hours of oral misoprostol followed by 10 hours of cervidil, which fell out after 10 hours.  On fetal monitoring, she had a category 1 strip.  Her Cardnoa score remained unchanged from admit with a score of 4.  She was given options to continue cervical ripening with a james ball, repeat cervidil or misoprostol.  She was also given the option to go home and return in 1 week.  After discussion, she decided to reschedule an induction for 39 weeks.      Physical exam on the day of discharge:  Vitals:    23 0711 23 1045 23 1500 23   BP: 122/67 129/87 139/83 137/82   BP Location: Right arm Right arm Right arm    Patient Position: Semi-Spain's Semi-Spain's Semi-Spain's    Cuff Size: Adult Regular Adult Regular Adult Regular    Pulse: 73      Resp: 16 16 16 16   Temp: 97.9  F (36.6  C) 98.1  F (36.7  C) 97.9  F (36.6  C) 97.5  F (36.4  C)   TempSrc: Oral Oral Oral Oral   SpO2: 100% 98% 95%      General: sitting up, alert and cooperative  Heart: reg rate, well perfused  Lungs: no increased work of breathing  Abd: soft, non-tender, gravid. Extremities: calves nontender, trace edema of lower extremities bilaterally    Lab Results   Component Value Date    HGB 10.6 2023    HGB 10.8 2023    HGB 12.1 02/10/2015    HGB 12.4 2015     Blood type: No results found for: RH    Discharge/Disposition:  Samantha Cody was discharged to home in stable condition with the following  instructions/medications:  1) labor and preeclampsia precautions reviewed.  2) She was instructed to follow-up with her primary OB within the  week for a routine prenatal visit and reschedule her ultrasound.  4) She was discharged home and induction was scheduled for 4/5/23.    Nadege Floyd MD   3/28/2023 10:01 PM

## 2023-03-29 NOTE — PROGRESS NOTES
Clinic Care Coordination Contact  [facility]: Post-Discharge Note  SITUATION                                                      Admission:    Admission Date: 23   Reason for Admission:   at 38w0d   - history of chronic hypertension, hypothyroid and BMI >40.  Discharge:   Discharge Date: 23  Discharge Diagnosis:   at 38w0d   - history of chronic hypertension, hypothyroid and BMI >40.    BACKGROUND                                                    She was given 24 hours of oral misoprostol followed by 10 hours of cervidil, which fell out after 10 hours.  On fetal monitoring, she had a category 1 strip.  Her Cardona score remained unchanged from admit with a score of 4.  She was given options to continue cervical ripening with a james ball, repeat cervidil or misoprostol.  She was also given the option to go home and return in 1 week.  After discussion, she decided to reschedule an induction for 39 weeks.        Physical exam on the day of discharge:  Vitals          Vitals:     23 0711 23 1045 23 1500 23   BP: 122/67 129/87 139/83 137/82   BP Location: Right arm Right arm Right arm     Patient Position: Semi-Spain's Semi-Spain's Semi-Spain's     Cuff Size: Adult Regular Adult Regular Adult Regular     Pulse: 73         Resp: 16 16 16 16   Temp: 97.9  F (36.6  C) 98.1  F (36.7  C) 97.9  F (36.6  C) 97.5  F (36.4  C)   TempSrc: Oral Oral Oral Oral   SpO2: 100% 98% 95%           General: sitting up, alert and cooperative  Heart: reg rate, well perfused  Lungs: no increased work of breathing  Abd: soft, non-tender, gravid. Extremities: calves nontender, trace edema of lower extremities bilaterally       ASSESSMENT           Discharge Assessment  How are you doing now that you are home?: Patient shares that she is doing well. Declines need for resources at this time sharing that they are well prepared for baby. Thanks writer for checking in  How are your  symptoms? (Red Flag symptoms escalate to triage hotline per guidelines): Improved  Do you feel your condition is stable enough to be safe at home until your provider visit?: Yes  Does the patient have questions regarding their discharge instructions? : No  Were you started on any new medications or were there changes to any of your previous medications? : Yes  Does the patient have all of their medications?: Yes  Do you have questions regarding any of your medications? : No  Do you have all of your needed medical supplies or equipment (DME)?  (i.e. oxygen tank, CPAP, cane, etc.): Yes  Discharge follow-up appointment scheduled within 14 calendar days? : Yes  Discharge Follow Up Appointment Date: 04/05/23  Discharge Follow Up Appointment Scheduled with?: Primary Care Provider    Post-op ([facility] Only)  If the patient had a surgery or procedure, do they have any questions for a nurse?: No    Post-op (Clinicians Only)  Did the patient have surgery or a procedure: No  Fever: No  Chills: No        PLAN                                                      Outpatient Plan:  Samantha Cody was discharged to home in stable condition with the following instructions/medications:  1) labor and preeclampsia precautions reviewed.  2) She was instructed to follow-up with her primary OB within the  week for a routine prenatal visit and reschedule her ultrasound.  4) She was discharged home and induction was scheduled for 4/5/23.    Future Appointments   Date Time Provider Department Center   4/5/2023  7:00 AM [facility]         For any urgent concerns, please contact our 24 hour nurse triage line: [phone] ([phone])         TRI Candelaria

## 2023-04-04 ENCOUNTER — TELEPHONE (OUTPATIENT)
Dept: OBGYN | Facility: CLINIC | Age: 23
End: 2023-04-04
Payer: COMMERCIAL

## 2023-04-04 NOTE — PATIENT INSTRUCTIONS
Patient pre-procedure instructions for pitocin induction reviewed, states understanding. Will call in am prior to arrival.

## 2023-04-05 ENCOUNTER — MYC MEDICAL ADVICE (OUTPATIENT)
Dept: OBGYN | Facility: CLINIC | Age: 23
End: 2023-04-05

## 2023-04-05 ENCOUNTER — HOSPITAL ENCOUNTER (OUTPATIENT)
Dept: ULTRASOUND IMAGING | Facility: CLINIC | Age: 23
Discharge: HOME | End: 2023-04-05
Attending: OBSTETRICS & GYNECOLOGY
Payer: COMMERCIAL

## 2023-04-05 ENCOUNTER — NURSE TRIAGE (OUTPATIENT)
Dept: NURSING | Facility: CLINIC | Age: 23
End: 2023-04-05

## 2023-04-05 ENCOUNTER — HOSPITAL ENCOUNTER (INPATIENT)
Facility: CLINIC | Age: 23
LOS: 3 days | Discharge: HOME | End: 2023-04-08
Attending: STUDENT IN AN ORGANIZED HEALTH CARE EDUCATION/TRAINING PROGRAM | Admitting: STUDENT IN AN ORGANIZED HEALTH CARE EDUCATION/TRAINING PROGRAM
Payer: COMMERCIAL

## 2023-04-05 DIAGNOSIS — E03.9 ACQUIRED HYPOTHYROIDISM: ICD-10-CM

## 2023-04-05 DIAGNOSIS — O10.919 CHRONIC HYPERTENSION IN PREGNANCY: ICD-10-CM

## 2023-04-05 DIAGNOSIS — Z34.03 ENCOUNTER FOR PRENATAL CARE IN THIRD TRIMESTER OF FIRST PREGNANCY: ICD-10-CM

## 2023-04-05 PROBLEM — Z34.00 PRENATAL CARE, FIRST PREGNANCY: Status: ACTIVE | Noted: 2022-08-11

## 2023-04-05 LAB
ABO/RH(D): NORMAL
ALBUMIN SERPL BCG-MCNC: 3.1 G/DL (ref 3.5–5.2)
ALP SERPL-CCNC: 151 U/L (ref 35–104)
ALT SERPL W P-5'-P-CCNC: 12 U/L (ref 10–35)
ANION GAP SERPL CALCULATED.3IONS-SCNC: 11 MMOL/L (ref 7–15)
ANTIBODY SCREEN: NEGATIVE
AST SERPL W P-5'-P-CCNC: 13 U/L (ref 10–35)
BILIRUB SERPL-MCNC: <0.2 MG/DL
BUN SERPL-MCNC: 7.3 MG/DL (ref 6–20)
CALCIUM SERPL-MCNC: 8.9 MG/DL (ref 8.6–10)
CHLORIDE SERPL-SCNC: 105 MMOL/L (ref 98–107)
CREAT SERPL-MCNC: 0.61 MG/DL (ref 0.51–0.95)
DEPRECATED HCO3 PLAS-SCNC: 21 MMOL/L (ref 22–29)
ERYTHROCYTE [DISTWIDTH] IN BLOOD BY AUTOMATED COUNT: 16.1 % (ref 10–15)
GFR SERPL CREATININE-BSD FRML MDRD: >90 ML/MIN/1.73M2
GLUCOSE SERPL-MCNC: 115 MG/DL (ref 70–99)
HCT VFR BLD AUTO: 33.4 % (ref 35–47)
HGB BLD-MCNC: 10.9 G/DL (ref 11.7–15.7)
MCH RBC QN AUTO: 26 PG (ref 26.5–33)
MCHC RBC AUTO-ENTMCNC: 32.6 G/DL (ref 31.5–36.5)
MCV RBC AUTO: 80 FL (ref 78–100)
PLATELET # BLD AUTO: 178 10E3/UL (ref 150–450)
POTASSIUM SERPL-SCNC: 4 MMOL/L (ref 3.4–5.3)
PROT SERPL-MCNC: 6.2 G/DL (ref 6.4–8.3)
RBC # BLD AUTO: 4.19 10E6/UL (ref 3.8–5.2)
SODIUM SERPL-SCNC: 137 MMOL/L (ref 136–145)
SPECIMEN EXPIRATION DATE: NORMAL
TSH SERPL DL<=0.005 MIU/L-ACNC: 2.08 UIU/ML (ref 0.3–4.2)
WBC # BLD AUTO: 9.3 10E3/UL (ref 4–11)

## 2023-04-05 PROCEDURE — 85027 COMPLETE CBC AUTOMATED: CPT | Performed by: STUDENT IN AN ORGANIZED HEALTH CARE EDUCATION/TRAINING PROGRAM

## 2023-04-05 PROCEDURE — 76819 FETAL BIOPHYS PROFIL W/O NST: CPT

## 2023-04-05 PROCEDURE — 250N000013 HC RX MED GY IP 250 OP 250 PS 637: Performed by: STUDENT IN AN ORGANIZED HEALTH CARE EDUCATION/TRAINING PROGRAM

## 2023-04-05 PROCEDURE — 120N000001 HC R&B MED SURG/OB

## 2023-04-05 PROCEDURE — 36415 COLL VENOUS BLD VENIPUNCTURE: CPT | Performed by: STUDENT IN AN ORGANIZED HEALTH CARE EDUCATION/TRAINING PROGRAM

## 2023-04-05 PROCEDURE — 86850 RBC ANTIBODY SCREEN: CPT | Performed by: STUDENT IN AN ORGANIZED HEALTH CARE EDUCATION/TRAINING PROGRAM

## 2023-04-05 PROCEDURE — 80053 COMPREHEN METABOLIC PANEL: CPT | Performed by: STUDENT IN AN ORGANIZED HEALTH CARE EDUCATION/TRAINING PROGRAM

## 2023-04-05 PROCEDURE — 86901 BLOOD TYPING SEROLOGIC RH(D): CPT | Performed by: STUDENT IN AN ORGANIZED HEALTH CARE EDUCATION/TRAINING PROGRAM

## 2023-04-05 PROCEDURE — 84443 ASSAY THYROID STIM HORMONE: CPT | Performed by: STUDENT IN AN ORGANIZED HEALTH CARE EDUCATION/TRAINING PROGRAM

## 2023-04-05 PROCEDURE — 86780 TREPONEMA PALLIDUM: CPT | Performed by: STUDENT IN AN ORGANIZED HEALTH CARE EDUCATION/TRAINING PROGRAM

## 2023-04-05 RX ORDER — METHYLERGONOVINE MALEATE 0.2 MG/ML
200 INJECTION INTRAVENOUS
Status: DISCONTINUED | OUTPATIENT
Start: 2023-04-05 | End: 2023-04-06

## 2023-04-05 RX ORDER — OXYTOCIN/0.9 % SODIUM CHLORIDE 30/500 ML
100-340 PLASTIC BAG, INJECTION (ML) INTRAVENOUS CONTINUOUS PRN
Status: DISCONTINUED | OUTPATIENT
Start: 2023-04-05 | End: 2023-04-06

## 2023-04-05 RX ORDER — HYDROXYZINE HYDROCHLORIDE 50 MG/1
50 TABLET, FILM COATED ORAL
Status: DISCONTINUED | OUTPATIENT
Start: 2023-04-05 | End: 2023-04-06

## 2023-04-05 RX ORDER — PROCHLORPERAZINE 25 MG
25 SUPPOSITORY, RECTAL RECTAL EVERY 12 HOURS PRN
Status: DISCONTINUED | OUTPATIENT
Start: 2023-04-05 | End: 2023-04-06

## 2023-04-05 RX ORDER — METOCLOPRAMIDE HYDROCHLORIDE 5 MG/ML
10 INJECTION INTRAMUSCULAR; INTRAVENOUS EVERY 6 HOURS PRN
Status: DISCONTINUED | OUTPATIENT
Start: 2023-04-05 | End: 2023-04-06

## 2023-04-05 RX ORDER — LEVOTHYROXINE SODIUM 75 UG/1
75 TABLET ORAL EVERY MORNING
Status: DISCONTINUED | OUTPATIENT
Start: 2023-04-06 | End: 2023-04-06

## 2023-04-05 RX ORDER — NALOXONE HYDROCHLORIDE 0.4 MG/ML
0.2 INJECTION, SOLUTION INTRAMUSCULAR; INTRAVENOUS; SUBCUTANEOUS
Status: DISCONTINUED | OUTPATIENT
Start: 2023-04-05 | End: 2023-04-06

## 2023-04-05 RX ORDER — NALOXONE HYDROCHLORIDE 0.4 MG/ML
0.4 INJECTION, SOLUTION INTRAMUSCULAR; INTRAVENOUS; SUBCUTANEOUS
Status: DISCONTINUED | OUTPATIENT
Start: 2023-04-05 | End: 2023-04-06

## 2023-04-05 RX ORDER — OXYTOCIN/0.9 % SODIUM CHLORIDE 30/500 ML
340 PLASTIC BAG, INJECTION (ML) INTRAVENOUS CONTINUOUS PRN
Status: DISCONTINUED | OUTPATIENT
Start: 2023-04-05 | End: 2023-04-06

## 2023-04-05 RX ORDER — MISOPROSTOL 100 UG/1
25 TABLET ORAL
Status: DISCONTINUED | OUTPATIENT
Start: 2023-04-05 | End: 2023-04-06

## 2023-04-05 RX ORDER — MISOPROSTOL 200 UG/1
800 TABLET ORAL
Status: DISCONTINUED | OUTPATIENT
Start: 2023-04-05 | End: 2023-04-06

## 2023-04-05 RX ORDER — TERBUTALINE SULFATE 1 MG/ML
0.25 INJECTION, SOLUTION SUBCUTANEOUS
Status: DISCONTINUED | OUTPATIENT
Start: 2023-04-05 | End: 2023-04-06

## 2023-04-05 RX ORDER — CITRIC ACID/SODIUM CITRATE 334-500MG
30 SOLUTION, ORAL ORAL
Status: DISCONTINUED | OUTPATIENT
Start: 2023-04-05 | End: 2023-04-06

## 2023-04-05 RX ORDER — KETOROLAC TROMETHAMINE 30 MG/ML
30 INJECTION, SOLUTION INTRAMUSCULAR; INTRAVENOUS
Status: DISCONTINUED | OUTPATIENT
Start: 2023-04-05 | End: 2023-04-06

## 2023-04-05 RX ORDER — CARBOPROST TROMETHAMINE 250 UG/ML
250 INJECTION, SOLUTION INTRAMUSCULAR
Status: DISCONTINUED | OUTPATIENT
Start: 2023-04-05 | End: 2023-04-06

## 2023-04-05 RX ORDER — ONDANSETRON 2 MG/ML
4 INJECTION INTRAMUSCULAR; INTRAVENOUS EVERY 6 HOURS PRN
Status: DISCONTINUED | OUTPATIENT
Start: 2023-04-05 | End: 2023-04-06

## 2023-04-05 RX ORDER — ONDANSETRON 4 MG/1
4 TABLET, ORALLY DISINTEGRATING ORAL EVERY 6 HOURS PRN
Status: DISCONTINUED | OUTPATIENT
Start: 2023-04-05 | End: 2023-04-06

## 2023-04-05 RX ORDER — METOCLOPRAMIDE 10 MG/1
10 TABLET ORAL EVERY 6 HOURS PRN
Status: DISCONTINUED | OUTPATIENT
Start: 2023-04-05 | End: 2023-04-06

## 2023-04-05 RX ORDER — MISOPROSTOL 200 UG/1
400 TABLET ORAL
Status: DISCONTINUED | OUTPATIENT
Start: 2023-04-05 | End: 2023-04-06

## 2023-04-05 RX ORDER — OXYTOCIN 10 [USP'U]/ML
10 INJECTION, SOLUTION INTRAMUSCULAR; INTRAVENOUS
Status: DISCONTINUED | OUTPATIENT
Start: 2023-04-05 | End: 2023-04-06

## 2023-04-05 RX ORDER — MORPHINE SULFATE 10 MG/ML
10 INJECTION, SOLUTION INTRAMUSCULAR; INTRAVENOUS
Status: DISCONTINUED | OUTPATIENT
Start: 2023-04-05 | End: 2023-04-06

## 2023-04-05 RX ORDER — IBUPROFEN 800 MG/1
800 TABLET, FILM COATED ORAL
Status: DISCONTINUED | OUTPATIENT
Start: 2023-04-05 | End: 2023-04-06

## 2023-04-05 RX ORDER — PROCHLORPERAZINE MALEATE 10 MG
10 TABLET ORAL EVERY 6 HOURS PRN
Status: DISCONTINUED | OUTPATIENT
Start: 2023-04-05 | End: 2023-04-06

## 2023-04-05 RX ORDER — TRANEXAMIC ACID 10 MG/ML
1 INJECTION, SOLUTION INTRAVENOUS EVERY 30 MIN PRN
Status: DISCONTINUED | OUTPATIENT
Start: 2023-04-05 | End: 2023-04-06

## 2023-04-05 RX ORDER — SODIUM CHLORIDE, SODIUM LACTATE, POTASSIUM CHLORIDE, CALCIUM CHLORIDE 600; 310; 30; 20 MG/100ML; MG/100ML; MG/100ML; MG/100ML
INJECTION, SOLUTION INTRAVENOUS CONTINUOUS
Status: DISCONTINUED | OUTPATIENT
Start: 2023-04-05 | End: 2023-04-06

## 2023-04-05 RX ORDER — ACETAMINOPHEN 325 MG/1
650 TABLET ORAL EVERY 4 HOURS PRN
Status: DISCONTINUED | OUTPATIENT
Start: 2023-04-05 | End: 2023-04-06

## 2023-04-05 RX ADMIN — MISOPROSTOL 25 MCG: 100 TABLET ORAL at 22:45

## 2023-04-05 RX ADMIN — MISOPROSTOL 25 MCG: 100 TABLET ORAL at 20:45

## 2023-04-05 ASSESSMENT — ACTIVITIES OF DAILY LIVING (ADL)
NUMBER_OF_TIMES_PATIENT_HAS_FALLEN_WITHIN_LAST_SIX_MONTHS: 0
DRESSING/BATHING_DIFFICULTY: NO
DIFFICULTY_EATING/SWALLOWING: NO
DOING_ERRANDS_INDEPENDENTLY_DIFFICULTY: NO
CHANGE_IN_FUNCTIONAL_STATUS_SINCE_ONSET_OF_CURRENT_ILLNESS/INJURY: NO
HEARING_DIFFICULTY_OR_DEAF: NO
ADLS_ACUITY_SCORE: 35
CONCENTRATING,_REMEMBERING_OR_MAKING_DECISIONS_DIFFICULTY: NO
FALL_HISTORY_WITHIN_LAST_SIX_MONTHS: NO
WEAR_GLASSES_OR_BLIND: NO
TOILETING_ISSUES: NO
ADLS_ACUITY_SCORE: 18
ADLS_ACUITY_SCORE: 18
WALKING_OR_CLIMBING_STAIRS_DIFFICULTY: NO
DIFFICULTY_COMMUNICATING: NO

## 2023-04-05 NOTE — TELEPHONE ENCOUNTER
Patient Samantha is 39 weeks pregnant and due date is 04/11/2023.  First pregnancy.  PCP [facility] Steele.  Hospital is [place].for delivery.  Patient is calling to see if her induction is still on for today at 7:00 am.  FNA transferred Marine through to [facility].        Reason for Disposition    Health Information question, no triage required and triager able to answer question    Additional Information    Negative: RN needs further essential information from caller in order to complete triage    Negative: Requesting regular office appointment    Negative: [1] Caller requesting NON-URGENT health information AND [2] PCP's office is the best resource    Protocols used: INFORMATION ONLY CALL - NO TRIAGE-A-

## 2023-04-05 NOTE — TELEPHONE ENCOUNTER
Called patient and informed her we would like to have her get a BPP today. I was able to schedule patient an appointment with Diagnostic imaging today 4/5/23 @ 11am. Patient stated that she will be able to come in today to do the BPP.    Jo Mendez [facility]

## 2023-04-05 NOTE — TELEPHONE ENCOUNTER
Huddled with Dr. Maldonado   Patient should have BPP today.  Calling US for appointment.    Chantale GARCIA   [facility]

## 2023-04-06 ENCOUNTER — ANESTHESIA EVENT (OUTPATIENT)
Dept: OBGYN | Facility: CLINIC | Age: 23
End: 2023-04-06
Payer: COMMERCIAL

## 2023-04-06 ENCOUNTER — ANESTHESIA (OUTPATIENT)
Dept: OBGYN | Facility: CLINIC | Age: 23
End: 2023-04-06
Payer: COMMERCIAL

## 2023-04-06 PROBLEM — O10.919 CHRONIC HYPERTENSION IN PREGNANCY: Status: ACTIVE | Noted: 2023-04-06

## 2023-04-06 PROBLEM — Z34.00 PRENATAL CARE, FIRST PREGNANCY: Status: RESOLVED | Noted: 2022-08-11 | Resolved: 2023-04-06

## 2023-04-06 PROBLEM — E03.9 ACQUIRED HYPOTHYROIDISM: Status: ACTIVE | Noted: 2023-04-06

## 2023-04-06 PROBLEM — Z34.90 ENCOUNTER FOR PLANNED INDUCTION OF LABOR: Status: RESOLVED | Noted: 2023-03-27 | Resolved: 2023-04-06

## 2023-04-06 PROBLEM — E66.01 MORBID OBESITY (H): Status: ACTIVE | Noted: 2022-06-09

## 2023-04-06 LAB — T PALLIDUM AB SER QL: NONREACTIVE

## 2023-04-06 PROCEDURE — 250N000013 HC RX MED GY IP 250 OP 250 PS 637: Performed by: STUDENT IN AN ORGANIZED HEALTH CARE EDUCATION/TRAINING PROGRAM

## 2023-04-06 PROCEDURE — 250N000009 HC RX 250: Performed by: NURSE ANESTHETIST, CERTIFIED REGISTERED

## 2023-04-06 PROCEDURE — 3E0R3BZ INTRODUCTION OF ANESTHETIC AGENT INTO SPINAL CANAL, PERCUTANEOUS APPROACH: ICD-10-PCS | Performed by: OBSTETRICS & GYNECOLOGY

## 2023-04-06 PROCEDURE — 258N000003 HC RX IP 258 OP 636: Performed by: STUDENT IN AN ORGANIZED HEALTH CARE EDUCATION/TRAINING PROGRAM

## 2023-04-06 PROCEDURE — 59400 OBSTETRICAL CARE: CPT | Performed by: OBSTETRICS & GYNECOLOGY

## 2023-04-06 PROCEDURE — 120N000001 HC R&B MED SURG/OB

## 2023-04-06 PROCEDURE — 722N000001 HC LABOR CARE VAGINAL DELIVERY SINGLE

## 2023-04-06 PROCEDURE — 250N000011 HC RX IP 250 OP 636: Performed by: NURSE ANESTHETIST, CERTIFIED REGISTERED

## 2023-04-06 PROCEDURE — 250N000009 HC RX 250: Performed by: STUDENT IN AN ORGANIZED HEALTH CARE EDUCATION/TRAINING PROGRAM

## 2023-04-06 PROCEDURE — 250N000013 HC RX MED GY IP 250 OP 250 PS 637: Performed by: OBSTETRICS & GYNECOLOGY

## 2023-04-06 PROCEDURE — 250N000011 HC RX IP 250 OP 636: Performed by: OBSTETRICS & GYNECOLOGY

## 2023-04-06 PROCEDURE — 370N000003 HC ANESTHESIA WARD SERVICE: Performed by: NURSE ANESTHETIST, CERTIFIED REGISTERED

## 2023-04-06 PROCEDURE — 0UQMXZZ REPAIR VULVA, EXTERNAL APPROACH: ICD-10-PCS | Performed by: OBSTETRICS & GYNECOLOGY

## 2023-04-06 PROCEDURE — 00HU33Z INSERTION OF INFUSION DEVICE INTO SPINAL CANAL, PERCUTANEOUS APPROACH: ICD-10-PCS | Performed by: OBSTETRICS & GYNECOLOGY

## 2023-04-06 PROCEDURE — 3E033VJ INTRODUCTION OF OTHER HORMONE INTO PERIPHERAL VEIN, PERCUTANEOUS APPROACH: ICD-10-PCS | Performed by: OBSTETRICS & GYNECOLOGY

## 2023-04-06 RX ORDER — CARBOPROST TROMETHAMINE 250 UG/ML
250 INJECTION, SOLUTION INTRAMUSCULAR
Status: DISCONTINUED | OUTPATIENT
Start: 2023-04-06 | End: 2023-04-08 | Stop reason: HOSPADM

## 2023-04-06 RX ORDER — FENTANYL CITRATE-0.9 % NACL/PF 10 MCG/ML
100 PLASTIC BAG, INJECTION (ML) INTRAVENOUS EVERY 5 MIN PRN
Status: DISCONTINUED | OUTPATIENT
Start: 2023-04-06 | End: 2023-04-06

## 2023-04-06 RX ORDER — MISOPROSTOL 200 UG/1
800 TABLET ORAL
Status: DISCONTINUED | OUTPATIENT
Start: 2023-04-06 | End: 2023-04-08 | Stop reason: HOSPADM

## 2023-04-06 RX ORDER — IBUPROFEN 800 MG/1
800 TABLET, FILM COATED ORAL
Status: DISCONTINUED | OUTPATIENT
Start: 2023-04-06 | End: 2023-04-08 | Stop reason: HOSPADM

## 2023-04-06 RX ORDER — METHYLERGONOVINE MALEATE 0.2 MG/ML
200 INJECTION INTRAVENOUS
Status: DISCONTINUED | OUTPATIENT
Start: 2023-04-06 | End: 2023-04-08 | Stop reason: HOSPADM

## 2023-04-06 RX ORDER — ONDANSETRON 4 MG/1
4 TABLET, FILM COATED ORAL EVERY 6 HOURS PRN
Status: DISCONTINUED | OUTPATIENT
Start: 2023-04-06 | End: 2023-04-06

## 2023-04-06 RX ORDER — LIDOCAINE 40 MG/G
CREAM TOPICAL
Status: DISCONTINUED | OUTPATIENT
Start: 2023-04-06 | End: 2023-04-06

## 2023-04-06 RX ORDER — OXYCODONE HYDROCHLORIDE 5 MG/1
5 TABLET ORAL
Status: DISCONTINUED | OUTPATIENT
Start: 2023-04-06 | End: 2023-04-08 | Stop reason: HOSPADM

## 2023-04-06 RX ORDER — TRANEXAMIC ACID 10 MG/ML
1 INJECTION, SOLUTION INTRAVENOUS EVERY 30 MIN PRN
Status: DISCONTINUED | OUTPATIENT
Start: 2023-04-06 | End: 2023-04-08 | Stop reason: HOSPADM

## 2023-04-06 RX ORDER — MODIFIED LANOLIN
OINTMENT (GRAM) TOPICAL
Status: DISCONTINUED | OUTPATIENT
Start: 2023-04-06 | End: 2023-04-08 | Stop reason: HOSPADM

## 2023-04-06 RX ORDER — DOCUSATE SODIUM 100 MG/1
100 CAPSULE, LIQUID FILLED ORAL DAILY
Status: DISCONTINUED | OUTPATIENT
Start: 2023-04-06 | End: 2023-04-08 | Stop reason: HOSPADM

## 2023-04-06 RX ORDER — OXYTOCIN 10 [USP'U]/ML
10 INJECTION, SOLUTION INTRAMUSCULAR; INTRAVENOUS
Status: DISCONTINUED | OUTPATIENT
Start: 2023-04-06 | End: 2023-04-08 | Stop reason: HOSPADM

## 2023-04-06 RX ORDER — ACETAMINOPHEN 325 MG/1
650 TABLET ORAL EVERY 4 HOURS PRN
Status: DISCONTINUED | OUTPATIENT
Start: 2023-04-06 | End: 2023-04-08 | Stop reason: HOSPADM

## 2023-04-06 RX ORDER — ENOXAPARIN SODIUM 100 MG/ML
40 INJECTION SUBCUTANEOUS EVERY 24 HOURS
Status: DISCONTINUED | OUTPATIENT
Start: 2023-04-07 | End: 2023-04-08 | Stop reason: HOSPADM

## 2023-04-06 RX ORDER — LIDOCAINE HYDROCHLORIDE AND EPINEPHRINE 15; 5 MG/ML; UG/ML
INJECTION, SOLUTION EPIDURAL PRN
Status: DISCONTINUED | OUTPATIENT
Start: 2023-04-06 | End: 2023-04-06

## 2023-04-06 RX ORDER — BISACODYL 10 MG
10 SUPPOSITORY, RECTAL RECTAL DAILY PRN
Status: DISCONTINUED | OUTPATIENT
Start: 2023-04-06 | End: 2023-04-08 | Stop reason: HOSPADM

## 2023-04-06 RX ORDER — PRENATAL VIT/IRON FUM/FOLIC AC 27MG-0.8MG
1 TABLET ORAL DAILY
Status: DISCONTINUED | OUTPATIENT
Start: 2023-04-07 | End: 2023-04-08 | Stop reason: HOSPADM

## 2023-04-06 RX ORDER — SODIUM CHLORIDE, SODIUM LACTATE, POTASSIUM CHLORIDE, CALCIUM CHLORIDE 600; 310; 30; 20 MG/100ML; MG/100ML; MG/100ML; MG/100ML
INJECTION, SOLUTION INTRAVENOUS CONTINUOUS PRN
Status: DISCONTINUED | OUTPATIENT
Start: 2023-04-06 | End: 2023-04-06

## 2023-04-06 RX ORDER — NALBUPHINE HYDROCHLORIDE 10 MG/ML
2.5-5 INJECTION, SOLUTION INTRAMUSCULAR; INTRAVENOUS; SUBCUTANEOUS EVERY 6 HOURS PRN
Status: DISCONTINUED | OUTPATIENT
Start: 2023-04-06 | End: 2023-04-06

## 2023-04-06 RX ORDER — KETOROLAC TROMETHAMINE 30 MG/ML
30 INJECTION, SOLUTION INTRAMUSCULAR; INTRAVENOUS
Status: DISCONTINUED | OUTPATIENT
Start: 2023-04-06 | End: 2023-04-08 | Stop reason: HOSPADM

## 2023-04-06 RX ORDER — MISOPROSTOL 200 UG/1
400 TABLET ORAL
Status: DISCONTINUED | OUTPATIENT
Start: 2023-04-06 | End: 2023-04-08 | Stop reason: HOSPADM

## 2023-04-06 RX ORDER — OXYTOCIN/0.9 % SODIUM CHLORIDE 30/500 ML
1-24 PLASTIC BAG, INJECTION (ML) INTRAVENOUS CONTINUOUS
Status: DISCONTINUED | OUTPATIENT
Start: 2023-04-06 | End: 2023-04-06

## 2023-04-06 RX ORDER — ONDANSETRON 4 MG/1
4 TABLET, ORALLY DISINTEGRATING ORAL EVERY 6 HOURS PRN
Status: DISCONTINUED | OUTPATIENT
Start: 2023-04-06 | End: 2023-04-08 | Stop reason: HOSPADM

## 2023-04-06 RX ORDER — OXYTOCIN/0.9 % SODIUM CHLORIDE 30/500 ML
100-340 PLASTIC BAG, INJECTION (ML) INTRAVENOUS CONTINUOUS PRN
Status: DISCONTINUED | OUTPATIENT
Start: 2023-04-06 | End: 2023-04-08 | Stop reason: HOSPADM

## 2023-04-06 RX ORDER — IBUPROFEN 800 MG/1
800 TABLET, FILM COATED ORAL EVERY 6 HOURS PRN
Status: DISCONTINUED | OUTPATIENT
Start: 2023-04-06 | End: 2023-04-08 | Stop reason: HOSPADM

## 2023-04-06 RX ORDER — HYDROCORTISONE 25 MG/G
CREAM TOPICAL 3 TIMES DAILY PRN
Status: DISCONTINUED | OUTPATIENT
Start: 2023-04-06 | End: 2023-04-08 | Stop reason: HOSPADM

## 2023-04-06 RX ORDER — OXYTOCIN/0.9 % SODIUM CHLORIDE 30/500 ML
340 PLASTIC BAG, INJECTION (ML) INTRAVENOUS CONTINUOUS PRN
Status: DISCONTINUED | OUTPATIENT
Start: 2023-04-06 | End: 2023-04-08 | Stop reason: HOSPADM

## 2023-04-06 RX ADMIN — MISOPROSTOL 25 MCG: 100 TABLET ORAL at 01:00

## 2023-04-06 RX ADMIN — ONDANSETRON 4 MG: 4 TABLET, ORALLY DISINTEGRATING ORAL at 14:18

## 2023-04-06 RX ADMIN — DOCUSATE SODIUM 100 MG: 100 CAPSULE, LIQUID FILLED ORAL at 12:16

## 2023-04-06 RX ADMIN — LEVOTHYROXINE SODIUM 75 MCG: 75 TABLET ORAL at 11:17

## 2023-04-06 RX ADMIN — IBUPROFEN 800 MG: 800 TABLET ORAL at 23:28

## 2023-04-06 RX ADMIN — Medication 320 ML/HR: at 10:35

## 2023-04-06 RX ADMIN — Medication: at 03:18

## 2023-04-06 RX ADMIN — ACETAMINOPHEN 650 MG: 325 TABLET, FILM COATED ORAL at 23:28

## 2023-04-06 RX ADMIN — Medication: at 08:58

## 2023-04-06 RX ADMIN — LIDOCAINE HYDROCHLORIDE AND EPINEPHRINE 5 ML: 15; 5 INJECTION, SOLUTION EPIDURAL at 03:15

## 2023-04-06 RX ADMIN — SODIUM CHLORIDE, POTASSIUM CHLORIDE, SODIUM LACTATE AND CALCIUM CHLORIDE: 600; 310; 30; 20 INJECTION, SOLUTION INTRAVENOUS at 01:39

## 2023-04-06 RX ADMIN — Medication 2 MILLI-UNITS/MIN: at 01:39

## 2023-04-06 ASSESSMENT — ACTIVITIES OF DAILY LIVING (ADL)
ADLS_ACUITY_SCORE: 18

## 2023-04-06 ASSESSMENT — LIFESTYLE VARIABLES: TOBACCO_USE: 1

## 2023-04-06 NOTE — PROGRESS NOTES
Assumed care at 0700.    0707: Patient reports feeling pressure. SVE 10/100/0. Provider updated.     Plan to labor down for the next 30-60 minutes then start pushing.

## 2023-04-06 NOTE — PLAN OF CARE
S:Delivery  B:Induced  Labor,39w2d    No results found for: GBS with antibiotic treatment not indicated 4 hours prior to delivery.  A: Patient delivered  with right periurethral tear at 1028 with Dr. JOEY Davidson in attendance and baby placed on mother's abdomen for delayed cord clamping. Baby dried and stimulated. Baby placed on mother's chest, but was brought to the warmer shortly after for assessment. Apgars 9/9. Delivery .  IV infusion of Oxytocin  infused. Placenta removal spontaneous. MD does not want placenta sent to pathology.  See Flowsheet for VS and PP checks. Delivery QBL (mL): 500 mL.  Labor care plan goals met, transition now to postpartum care.  R: Expect routine postpartum care. Anticipate first feeding within the hour or whenever infant displays feeding cues. Continue skin to skin. Prior discussion with mother indicates that feeding plan is Breast feeding . Educated mother on importance of exclusive breastfeeding, expected feeding readiness cues and encouraged her to observe for these cues while rooming in. Informed her that breastfeeding assistance would be provided.

## 2023-04-06 NOTE — ANESTHESIA POSTPROCEDURE EVALUATION
Patient: Samantha Cody    Procedure: * No procedures listed *       Anesthesia Type:  Epidural    Note:  Disposition: Inpatient   Postop Pain Control: Uneventful            Sign Out: Well controlled pain   PONV: No   Neuro/Psych: Uneventful            Sign Out: Acceptable/Baseline neuro status   Airway/Respiratory: Uneventful            Sign Out: Acceptable/Baseline resp. status   CV/Hemodynamics: Uneventful            Sign Out: Acceptable CV status; No obvious hypovolemia; No obvious fluid overload   Other NRE: NONE   DID A NON-ROUTINE EVENT OCCUR? No           Last vitals:  Vitals:    04/06/23 1247 04/06/23 1409 04/06/23 1638   BP: 126/72 123/79 135/86   Pulse:   92   Resp:   18   Temp:   36.4  C (97.5  F)   SpO2:   97%       Electronically Signed By: RAFY Moon CRNA  April 6, 2023  6:34 PM

## 2023-04-06 NOTE — PROGRESS NOTES
Up to BR for pericare. Pt was able to void. Mother and baby transferred to postpartum unit at 1510 via ambulatory and bassinet after completion of immediate recovery period. Patient oriented to room and instructed to call for assistance when up to the bathroom the next time. Mother and baby bonding well and in stable condition upon transfer.

## 2023-04-06 NOTE — PROGRESS NOTES
"[facility] OB/GYN Department    Labor Note    Date of Admission: 2023  Name: Samantha Cody    Subjective:    Late entry due to patient care.    0730 Patient comfortable in bed with epidural     Objective:    Vital signs:  Temp: 98.2  F (36.8  C) Temp src: Oral BP: 123/79 Pulse: 96   Resp: 18 SpO2: 97 % O2 Device: None (Room air)     Weight: 127.1 kg (280 lb 3.3 oz)  Estimated body mass index is 43.89 kg/m  as calculated from the following:    Height as of 3/22/23: 1.702 m (5' 7\").    Weight as of this encounter: 127.1 kg (280 lb 3.3 oz).      FHR:  120s baseline, moderate variability, + accelerations, no decelerations  Uterine Contractions:  q2min  Sterile Vaginal Exam:  10/100/0 per RN    Assessment and Plan:    Assumed care of patient at 0730. Undergoing IOL for chronic hypertension. Started on Misoprostol and cervical balloon placed. SROM around 01:00. Pitocin started after. She has now progressed to complete dilation. She is comfortable and not feeling any pressure. She will labor down and then start pushing. Anticipate .      Milka Davidson DO"

## 2023-04-06 NOTE — PLAN OF CARE
39w2d   IOL    SVE: 7/80-90%/-1 @0500    Labor plan: augmentation with  pitocin rate Dose (tasneem-units/min) Oxytocin: 2 tasneem-units/min at 0140 .    Membrane Status: Intact    Fetal heart tones assessed and Category 1 tracing, moderate variability , accelerations, and early decelerations present.     Uterine activity assessed and normal uterine activity present.     Interventions included flying cowgirl.     Fetal heart tones returned to baseline, moderate variability, and accelerations present.     Dr. Maldonado in department.     Pain mgmt: Epidural placed at 0310. Dosed at 0320          Plan: is to continue to monitor. Patient informed of and agrees with plan of care.      Archana Watkins RN on 2023 at 7:00 AM

## 2023-04-06 NOTE — PROGRESS NOTES
0840: Patient started pushing 3-4 times per contraction in the stir-ups.     Epidural working well.   Continuous EFM. Yesica monitor removed and external monitor applied to see contractions better per MD. Category 1 when able to get tracing.      1028: Delivered.

## 2023-04-06 NOTE — PROGRESS NOTES
[place] [facility] ADMISSION NOTE    Patient admitted to room 2052 at approximately 1905 via ambulation from home for IOL. Patient was accompanied by significant other.     Patient ambulated to bed independently. Patient alert and oriented X 3. The patient is not having any pain.  . Admission vital signs: Blood pressure 134/77, pulse 98, temperature 98  F (36.7  C), temperature source Oral, resp. rate 18, last menstrual period 06/06/2022, SpO2 98 %, not currently breastfeeding. Patient were oriented to plan of care, call light, bed controls, tv, telephone, bathroom and visiting hours.     Risk Assessment    The following safety risks were identified during admission: none. Yellow risk band applied: NO.     Education    Patient has a [place] to Observation order: No  Observation education completed and documented: N/A     Archana Watkins RN on 4/5/2023 at 7:35 PM        Archana Watkins RN

## 2023-04-06 NOTE — ANESTHESIA PREPROCEDURE EVALUATION
Anesthesia Pre-Procedure Evaluation    Patient: Samantha Cody   MRN: [id number] : 2000        Procedure :           Past Medical History:   Diagnosis Date     Hypothyroidism       Past Surgical History:   Procedure Laterality Date     NO HISTORY OF SURGERY        No Known Allergies   Social History     Tobacco Use     Smoking status: Every Day     Types: Vaping Device     Smokeless tobacco: Never     Tobacco comments:     cutting down with pregnancy   Vaping Use     Vaping status: Every Day     Substances: Nicotine     Devices: Disposable   Substance Use Topics     Alcohol use: Not Currently      Wt Readings from Last 1 Encounters:   23 127.1 kg (280 lb 3.3 oz)        Anesthesia Evaluation   Pt has not had prior anesthetic         ROS/MED HX  ENT/Pulmonary:     (+) tobacco use, Current use,     Neurologic:       Cardiovascular:       METS/Exercise Tolerance:     Hematologic:       Musculoskeletal:       GI/Hepatic:       Renal/Genitourinary:       Endo:     (+) thyroid problem, Obesity,     Psychiatric/Substance Use:       Infectious Disease:       Malignancy:       Other:            Physical Exam    Airway  airway exam normal      Mallampati: II   TM distance: > 3 FB   Neck ROM: full   Mouth opening: > 3 cm    Respiratory Devices and Support         Dental       (+) Minor Abnormalities - some fillings, tiny chips      Cardiovascular   cardiovascular exam normal       Rhythm and rate: regular and normal     Pulmonary           breath sounds clear to auscultation           OUTSIDE LABS:  CBC:   Lab Results   Component Value Date    WBC 9.3 2023    WBC 8.7 2023    HGB 10.9 (L) 2023    HGB 10.6 (L) 2023    HCT 33.4 (L) 2023    HCT 32.8 (L) 2023     2023     2023     BMP:   Lab Results   Component Value Date     2023     (L) 2023    POTASSIUM 4.0 2023    POTASSIUM 3.9 2023    CHLORIDE 105 2023     CHLORIDE 103 03/27/2023    CO2 21 (L) 04/05/2023    CO2 21 (L) 03/27/2023    BUN 7.3 04/05/2023    BUN 5.8 (L) 03/27/2023    CR 0.61 04/05/2023    CR 0.52 03/27/2023     (H) 04/05/2023    GLC 90 03/27/2023     COAGS: No results found for: PTT, INR, FIBR  POC:   Lab Results   Component Value Date    HCG Negative 11/02/2021    HCGS Negative 11/02/2021     HEPATIC:   Lab Results   Component Value Date    ALBUMIN 3.1 (L) 04/05/2023    PROTTOTAL 6.2 (L) 04/05/2023    ALT 12 04/05/2023    AST 13 04/05/2023    ALKPHOS 151 (H) 04/05/2023    BILITOTAL <0.2 04/05/2023     OTHER:   Lab Results   Component Value Date    PEDRITO 8.9 04/05/2023    LIPASE 17 01/16/2023    TSH 2.08 04/05/2023    T4 1.17 01/06/2023    CRP <2.9 02/10/2015    SED 7 02/10/2015       Anesthesia Plan    ASA Status:  3      Anesthesia Type: Epidural.              Consents    Anesthesia Plan(s) and associated risks, benefits, and realistic alternatives discussed. Questions answered and patient/representative(s) expressed understanding.     - Discussed: Risks, Benefits and Alternatives for the PROCEDURE were discussed     - Discussed with:  Patient      - Extended Intubation/Ventilatory Support Discussed: No.      - Patient is DNR/DNI Status: No    Use of blood products discussed: No .     Postoperative Care            Comments:                RAFY Whaley CRNA

## 2023-04-06 NOTE — PROGRESS NOTES
1400: Attempted to have patient stand and walk to the bathroom, however patient became nauseous with standing. Patient laid back down in bed. Fundus firm; minimal bleeding. /79. Cool wash cloth applied to forehead. MD notified and zofran was ordered.

## 2023-04-06 NOTE — ANESTHESIA PROCEDURE NOTES
"Epidural catheter Procedure Note    Pre-Procedure   Staff -        CRNA: Richard Lambert APRN CRNA       Performed By: CRNA       Location: OB       Procedure Start/Stop Times: 4/6/2023 2:55 AM and 4/6/2023 3:21 AM       Pre-Anesthestic Checklist: patient identified, IV checked, risks and benefits discussed, informed consent, monitors and equipment checked, pre-op evaluation and at physician/surgeon's request  Timeout:       Correct Patient: Yes        Correct Procedure: Yes        Correct Site: Yes        Correct Position: Yes   Procedure Documentation  Procedure: epidural catheter       Diagnosis: Labor Pain       Patient Position: sitting       Patient Prep/Sterile Barriers: sterile gloves, mask, patient draped       Skin prep: Chloraprep       Local skin infiltrated with 5 mL of 1% lidocaine.        Insertion Site: L3-4. (midline approach).       Technique: LORT air        Needle Type: Cm needle and [place]       Needle Gauge: 17.        Needle Length (Inches): 3.5        Catheter: 19 G.          Catheter threaded easily.           Threaded 15 cm at skin.         # of attempts: 1 and  # of redirects:     Assessment/Narrative         Paresthesias: No.       Test dose of 3 mL lidocaine 1.5% w/ 1:200,000 epinephrine at 03:15 CDT.         Test dose negative, 3 minutes after injection, for signs of intravascular, subdural, or intrathecal injection.       Insertion/Infusion Method: LORT air       Aspiration negative for Heme or CSF via Epidural Catheter.       Sensory Level Left: T6.       Sensory Level Right: T6.    Medication(s) Administered   Medication Administration Time: 4/6/2023 2:55 AM      FOR [facility] ([facility]) ONLY:   Pain Team Contact information: please page the Pain Team Via Click[employer]. Search \"Pain\". During daytime hours, please page the attending first. At night please page the resident first.      "

## 2023-04-06 NOTE — L&D DELIVERY NOTE
"Delivery Summary    Samantha Cody MRN# [id number]   Age: 22 year old YOB: 2000     ASSESSMENT & PLAN:       Samantha Cody is a 22 year old  presented at 39 weeks 1 days who was admitted to L&D for induction of labor for chronic hypertension. Her pregnancy was complicated by CHTN, hypothyroid, morbid obesity. She was started on Misoprostol and Cervical balloon placed for cervical ripening. She was then started on Pitocin after SROM. She progressed through labor until complete dilation. Patient did labor down. She pushed effectively and delivered a viable female infant with Apgars 9/9 over an intact perineum. Weight is 7#10oz and skin-to-skin was performed. Delayed cord clamping was performed. Cord was clamped and cut. Placenta delivered via active management. Trailing membranes were removed manually. Bladder was drained for 500cc which improved uterine tone.  Right periurethral laceration was repaired with 3-0 vicryl in standard fashion. QBL 500ml. Sponge and needle counts correct. Mom and baby were transported to postpartum in stable condition.     \"Rochelle\"       Escobar, Female-Samantha [[id number]]    Labor Event Times    Active labor onset date: 23 Onset time:  1:18 AM CDT   Dilation complete date: 23 Complete time:  7:07 AM   Start pushing date/time: 2023 0840      Labor Length    1st Stage (hrs): 5 (min): 49   2nd Stage (hrs): 3 (min): 21   3rd Stage (hrs): 0 (min): 5      Labor Events     labor?: No   steroids: None  Labor Type: Induction/Cervical ripening  Predominate monitoring during 1st stage: continuous electronic fetal monitoring     Antibiotics received during labor?: No     Rupture identifier: Sac 1  Rupture date/time: 23 0118   Rupture type: Spontaneous Rupture of Membranes  Fluid color: Clear  Fluid odor: Normal     Induction: Misoprostol, Mechanical ripening agent  Mechanical ripening occurred: In hospital  Induction date/time: 23 " "0140    Cervical ripening date/time: 23    Indications for induction: Chronic Hypertension     Augmentation: Oxytocin  Indications for augmentation: Ineffective Contraction Pattern     Delivery/Placenta Date and Time    Delivery Date: 23 Delivery Time: 10:28 AM   Placenta Date/Time: 2023 10:34 AM  Oxytocin given at the time of delivery: after delivery of placenta  Delivering clinician: Milka Davidson,    Other personnel present at delivery:  Provider Role   Milly Lopez RN Delivery Nurse   Bonnie Corey RN Charge Nurse         Vaginal Counts     Initial count performed by 2 team members:  Two Team Members   Hali Magaña RN       [place] Suture Needles Sponges (RETIRED) Instruments   Initial counts 2  5    Added to count  1     Relief counts       Final counts 2 1 5          Placed during labor Accounted for at the end of labor   FSE NA NA   IUPC NA NA   Cervidil NA NA              Final count performed by 2 team members:  Two Team Members   Bonnie Corey Dr.      Final count correct?: Yes     Apgars    Living status: Living   1 Minute 5 Minute 10 Minute 15 Minute 20 Minute   Skin color: 1  1       Heart rate: 2  2       Reflex irritability: 2  2       Muscle tone: 2  2       Respiratory effort: 2  2       Total: 9  9       Apgars assigned by: SAHRA GOMEZ RN     Cord    Vessels: 3 Vessels    Cord Complications: None   Cord Blood Disposition: Lab      Gases Sent?: No      Delayed cord clamping?: Yes      Cord Clamping Delay (seconds): 31-60 seconds      Stem cell collection?: No                     Lima Resuscitation    Methods: None     Lima Measurements    Weight: 7 lb 10 oz Length: 1' 8\"   Head circumference: 33.7 cm       Skin to Skin and Feeding Plan    Skin to skin initiation date/time: 1841    Skin to skin with: Mother  Skin to skin end date/time:     Breastfeeding initiated date/time: 2023 1123     Labor Events and Shoulder Dystocia    Fetal Tracing Prior to " Delivery: Category 1  Shoulder dystocia present?: Neg     Delivery (Maternal) (Provider to Complete) ([id number])    Episiotomy: None  Perineal lacerations: None    Periurethral laceration: right Repaired?: Yes   Repair suture: 3-0 Vicryl  Genital tract inspection done: Pos     Blood Loss  Mother: Samantha Cody #[id number]   Start of Mother's Information    Delivery Blood Loss  04/06/23 0118 - 04/06/23 1447    Delivery QBL (mL) Hospital Encounter 500 mL    Postpartum QBL (mL) Hospital Encounter 57 mL    Total  557 mL         End of Mother's Information  Mother: Samantha Cody #[id number]          Delivery - Provider to Complete (230495)    Delivering clinician: Milka Davidson DO  Delivery Type (Choose the 1 that will go to the Birth History): Vaginal, Spontaneous    Other personnel:  Provider Role   Milly Lopez RN Delivery Nurse   Bonnie Corey RN Charge Nurse                               Placenta    Date/Time: 4/6/2023 10:34 AM  Removal: Spontaneous  Disposition: Hospital disposal           Anesthesia    Method: Epidural  Cervical dilation at placement: 4-7                Presentation and Position    Presentation: Vertex    Position: Middle Occiput Anterior                 Milka Davidson DO

## 2023-04-06 NOTE — H&P
[facility]  OB History and Physical      Name: Samantha Cody MRN#: [id number]   Age: 22 year old YOB: 2000      Assessment and Plan:   Samantha Cody is a 22 year old , at 39w1d by 7w2d US, who presents with induction of labor for chronic hypertension.    Induction of Labor  -We will start with oral misoprostol and intrauterine balloon simultaneously.. Then, IV Pitocin and AROM as able. Intrauterine cook balloon placed with 50mL saline.  -GBS negative  - Labs: CBC, T&S, RPR  - Discussed pain management options, nitrous oxide if COVID negative, IV narcotics and epidural.  Patient desires epidural.  - Diet: Regular in early labor. Clear liquid diet once on pitocin or in active labor    Chronic hypertension  - Serial BP monitoring   - IV Antihypertensives prn for sustained severe range blood pressures (>160/>110)  - Labs: CBC, CMP  - Daily weights, strict I&Os     Hypothyroidism: Repeat TSH obtained today.  Continue home Synthroid 75 mcg    PNC  - Rh positive, Rubella immune, GCT passed, GBS neg  - Other prenatal labs wnl  - Contraception: will discuss after delivery  - Feeding: will discuss after delivery     FWB:   Cat I tracing  - Continuous Fetal Monitoring  - Intrauterine resuscitative measures prn    Erica Maldonado MD  Obstetrics and Gynecology  [facility]   2023    HPI:     Notes normal fetal movement. Denies headache, vision changes, chest pain, SOB, epigastric/RUQ pain, acute worsening in bilateral lower extremities swelling. Denies vaginal bleeding or gush of fluid like rupture of membrane. Denied contractions.         ROS:   Complete 10-point ROS negative except as noted in HPI.       OB History:     Pregnancy Complications:  -cHTN  -Hypothyroidism: on levothyroxine 75mcg in the morning  -Pre-pregnancy BMI 43.84  -Tobacco use disorder via vaping                            Past Medical History:   Denied personal history of  asthma  Past Medical History:   Diagnosis Date     Hypothyroidism           Past Surgical History:   Denied personal history of abdominal surgical history.  Past Surgical History:   Procedure Laterality Date     NO HISTORY OF SURGERY            Social History:     Social History     Tobacco Use     Smoking status: Every Day     Types: Vaping Device     Smokeless tobacco: Never     Tobacco comments:     cutting down with pregnancy   Vaping Use     Vaping status: Every Day     Substances: Nicotine     Devices: Disposable   Substance Use Topics     Alcohol use: Not Currently   Vapes several times per day.  Declined nicotine products here.  Denied alcohol use or other recreational drug use.       Family History:   Denied family history of bleeding/clotting disorder or adverse reaction to anesthesia.  Family History   Problem Relation Age of Onset     Hypertension Mother      Hypertension Father      Thyroid Disease Paternal Grandmother      Gynecology Other         ovarian cancer age:25 -paternal aunt          Immunizations:     Immunization History   Administered Date(s) Administered     Comvax (HIB/HepB) 02/21/2001, 04/17/2001, 04/18/2002     DTAP (<7y) 02/21/2001, 04/17/2001, 06/21/2001, 04/18/2002, 01/20/2006     HEPA 08/29/2014     HPV9 06/09/2022     Influenza (IIV3) PF 10/20/2003     MMR 01/23/2002, 01/20/2006     Meningococcal ACWY (Menactra ) 10/24/2013     Pneumococcal (PCV 7) 02/21/2001, 04/17/2001, 06/21/2001     Poliovirus, inactivated (IPV) 02/21/2001, 04/17/2001, 01/10/2002, 01/20/2006     TDAP (Adacel,Boostrix) 11/09/2020, 02/03/2023     TDAP Vaccine (Adacel) 10/24/2013     Varicella 01/23/2002, 08/01/2008          Allergies:   No Known Allergies       Medications:     Medications Prior to Admission   Medication Sig Dispense Refill Last Dose     aspirin 81 MG EC tablet Take 81 mg by mouth daily   Past Month     cyanocobalamin (VITAMIN B-12) 1000 MCG tablet Take 1 tablet (1,000 mcg) by mouth daily 90  tablet 1 4/4/2023     ferrous sulfate (FEROSUL) 325 (65 Fe) MG tablet Take 1 tablet (325 mg) by mouth daily (with breakfast) 30 tablet 3 4/4/2023     levothyroxine (SYNTHROID/LEVOTHROID) 75 MCG tablet Take 1 tablet (75 mcg) by mouth daily 60 tablet 1 4/5/2023     Prenatal Vit-Fe Fumarate-FA (PRENATAL MULTIVITAMIN W/IRON) 27-0.8 MG tablet Take 1 tablet by mouth daily   4/4/2023     terbinafine (LAMISIL) 1 % external cream Apply topically 2 times daily 30 g 1 Unknown     vitamin C (ASCORBIC ACID) 500 MG tablet Take 1 tablet (500 mg) by mouth daily 60 tablet 3 4/4/2023          PE:   Vit:   Patient Vitals for the past 4 hrs:   BP Temp Temp src Pulse Resp SpO2   04/05/23 1910 134/77 98  F (36.7  C) Oral 98 18 98 %      Gen: Well-appearing, NAD, comfortable   CV: Well-perfused  Pulm: C normal respiratory efforts  Abd: Soft, gravid, non-tender  Ext: Trace LE edema b/l  Cx: 1-2/long/-3 posterior,, medium texture    FHT: Baseline 130, moderate variability, accelerations present, no decelerations   [place]: no contractions in 10 minutes

## 2023-04-07 PROBLEM — O10.919 CHRONIC HYPERTENSION AFFECTING PREGNANCY: Status: ACTIVE | Noted: 2023-04-07

## 2023-04-07 LAB
HGB BLD-MCNC: 9.2 G/DL (ref 11.7–15.7)
HOLD SPECIMEN: NORMAL

## 2023-04-07 PROCEDURE — 36415 COLL VENOUS BLD VENIPUNCTURE: CPT | Performed by: OBSTETRICS & GYNECOLOGY

## 2023-04-07 PROCEDURE — 250N000011 HC RX IP 250 OP 636: Performed by: OBSTETRICS & GYNECOLOGY

## 2023-04-07 PROCEDURE — 250N000013 HC RX MED GY IP 250 OP 250 PS 637: Performed by: OBSTETRICS & GYNECOLOGY

## 2023-04-07 PROCEDURE — 250N000013 HC RX MED GY IP 250 OP 250 PS 637: Performed by: STUDENT IN AN ORGANIZED HEALTH CARE EDUCATION/TRAINING PROGRAM

## 2023-04-07 PROCEDURE — 85018 HEMOGLOBIN: CPT | Performed by: OBSTETRICS & GYNECOLOGY

## 2023-04-07 PROCEDURE — 120N000001 HC R&B MED SURG/OB

## 2023-04-07 RX ORDER — LEVOTHYROXINE SODIUM 25 UG/1
50 TABLET ORAL DAILY
Qty: 50 TABLET | Refills: 3 | Status: SHIPPED | OUTPATIENT
Start: 2023-04-07 | End: 2023-10-17

## 2023-04-07 RX ORDER — LEVOTHYROXINE SODIUM 50 UG/1
50 TABLET ORAL
Status: DISCONTINUED | OUTPATIENT
Start: 2023-04-07 | End: 2023-04-08 | Stop reason: HOSPADM

## 2023-04-07 RX ADMIN — ENOXAPARIN SODIUM 40 MG: 100 INJECTION SUBCUTANEOUS at 08:59

## 2023-04-07 RX ADMIN — PRENATAL VITAMINS-IRON FUMARATE 27 MG IRON-FOLIC ACID 0.8 MG TABLET 1 TABLET: at 08:59

## 2023-04-07 RX ADMIN — IBUPROFEN 800 MG: 800 TABLET ORAL at 08:58

## 2023-04-07 RX ADMIN — ACETAMINOPHEN 650 MG: 325 TABLET, FILM COATED ORAL at 08:58

## 2023-04-07 RX ADMIN — LEVOTHYROXINE SODIUM 50 MCG: 50 TABLET ORAL at 13:05

## 2023-04-07 RX ADMIN — DOCUSATE SODIUM 100 MG: 100 CAPSULE, LIQUID FILLED ORAL at 08:59

## 2023-04-07 ASSESSMENT — ACTIVITIES OF DAILY LIVING (ADL)
ADLS_ACUITY_SCORE: 18

## 2023-04-07 NOTE — PLAN OF CARE
Goal Outcome Evaluation:      Plan of Care Reviewed With: patient, significant other    Overall Patient Progress: improvingOverall Patient Progress: improving     Data: Vital signs within normal limits. Postpartum checks within normal limits - see flow record. Patient eating and drinking normally. Patient able to empty bladder independently and is up ambulating. No apparent signs of infection.  bottom  healing well. Patient performing self cares and is able to care for infant.  Action: Patient medicated during the shift for pain and cramping. See MAR. Patient reassessed within 1 hour after each medication and pain was improved - patient stated she was comfortable. Patient education done about self and NB cares. See flow record.  Response: Positive attachment behaviors observed with infant. Support persons are present.   Plan: Anticipate discharge on 4/8/23.

## 2023-04-07 NOTE — PLAN OF CARE
Data: Vital signs within normal limits. Postpartum checks within normal limits - see flow record. Patient  is tolerating po intake. Patient is able to empty bladder independently. . Patient ambulating independently..   No apparent signs of infection. perineum healing well. Patient is performing self cares and is able to care for infant. Positive attachment behaviors are observed with infant. Support persons are present.    Action:  Pain plan was discussed. Patient will request pain med when she is ready for it. Patient was medicated during the shift for cramping. See MAR.Patient education done about breastfeeding, postpartum cares, pain management/plan, fall risk, and rest. See flow record.    Response:   Patient reassessed within 1 hour after each medication for pain. Patient stated that pain had improved. Patient stated that she was comfortable. .     Plan: Anticipate discharge on 04/08/2023.     Archana Watkins RN on 4/7/2023 at 5:09 AM

## 2023-04-07 NOTE — PROGRESS NOTES
[facility] OB/GYN Postpartum Note    S: Pain well controlled with current pain meds. Lochia minimal.  Eating and drinking without nausea/vomiting.  Ambulating without difficulty.  Voiding without difficulty.      O:  Patient Vitals for the past 24 hrs:   BP Temp Temp src Pulse Resp SpO2   23 0413 115/70 97.7  F (36.5  C) Oral 73 16 97 %   23 0037 -- -- -- -- 16 --   23 2320 122/56 98.2  F (36.8  C) Oral 90 18 --   23 129/86 97.8  F (36.6  C) Oral 83 18 98 %   23 1638 135/86 97.5  F (36.4  C) Oral 92 18 97 %   23 1409 123/79 -- -- -- -- --   23 1247 126/72 -- -- -- -- --   23 1232 129/75 -- -- -- -- --   23 1217 128/63 -- -- -- -- --   23 1202 126/63 -- -- -- -- --   23 1147 115/77 -- -- -- -- --   23 1133 114/82 -- -- -- -- --   23 1117 137/75 -- -- -- -- --   23 1102 135/79 -- -- -- -- --   23 1047 132/79 -- -- -- -- 97 %     Gen:  NAD  CV: Well perfused  Resp: Bilateral chest rise and fall  Abd: soft, nondistended, nontender, fundus firm at 2cm below umbilicus  Ext: non-tender, trace edema, no erythema    Hemoglobin   Date Value Ref Range Status   2023 9.2 (L) 11.7 - 15.7 g/dL Final   2023 10.9 (L) 11.7 - 15.7 g/dL Final   02/10/2015 12.1 11.7 - 15.7 g/dL Final   2015 12.4 11.7 - 15.7 g/dL Final     A/P: 22 year old  PPD#1 s/p , doing well postpartum    Routine postpartum care  - Heme: Acute blood loss anemia from appropriate blood loss during delivery on mild chronic anemia. Will continue prenatal vitamin with iron upon discharge  - Pain: Well controlled on PRN  ibuprofen 800mg q6hr and PRN tylenol 650mg q4hr  - GI: Continue docusate 100mg daily  - : Voiding spontaneously   - Rh positivie, Rubella immune, GBS neg  - PPx: Encourage ambulation    Hypothyroidism: decreased to prepregnancy synthyroid dosage - 50mcg and prescribed more 50mcg Synthroid for discharge.  TSH recheck at  6 weeks postpartum visit.    Chronic hypertension: No preeclampsia symptoms today.  Blood pressure postpartum has been normal.    Disposition: discharge to home on PPD#2    Erica Maldonado MD  Obstetrics and Gynecology  [facility]   04/07/2023

## 2023-04-08 VITALS
OXYGEN SATURATION: 97 % | RESPIRATION RATE: 16 BRPM | TEMPERATURE: 97.6 F | SYSTOLIC BLOOD PRESSURE: 123 MMHG | WEIGHT: 280.2 LBS | DIASTOLIC BLOOD PRESSURE: 73 MMHG | BODY MASS INDEX: 43.89 KG/M2 | HEART RATE: 82 BPM

## 2023-04-08 PROCEDURE — 250N000011 HC RX IP 250 OP 636: Performed by: OBSTETRICS & GYNECOLOGY

## 2023-04-08 PROCEDURE — 250N000013 HC RX MED GY IP 250 OP 250 PS 637: Performed by: STUDENT IN AN ORGANIZED HEALTH CARE EDUCATION/TRAINING PROGRAM

## 2023-04-08 PROCEDURE — 250N000013 HC RX MED GY IP 250 OP 250 PS 637: Performed by: OBSTETRICS & GYNECOLOGY

## 2023-04-08 RX ORDER — IBUPROFEN 600 MG/1
600 TABLET, FILM COATED ORAL EVERY 6 HOURS PRN
Qty: 60 TABLET | Refills: 0 | Status: SHIPPED | OUTPATIENT
Start: 2023-04-08 | End: 2023-07-07

## 2023-04-08 RX ORDER — ACETAMINOPHEN 325 MG/1
650 TABLET ORAL EVERY 6 HOURS PRN
Qty: 100 TABLET | Refills: 0 | Status: SHIPPED | OUTPATIENT
Start: 2023-04-08 | End: 2023-07-07

## 2023-04-08 RX ORDER — AMOXICILLIN 250 MG
1 CAPSULE ORAL DAILY
Qty: 100 TABLET | Refills: 0 | Status: SHIPPED | OUTPATIENT
Start: 2023-04-08 | End: 2023-07-07

## 2023-04-08 RX ADMIN — PRENATAL VITAMINS-IRON FUMARATE 27 MG IRON-FOLIC ACID 0.8 MG TABLET 1 TABLET: at 11:50

## 2023-04-08 RX ADMIN — ENOXAPARIN SODIUM 40 MG: 100 INJECTION SUBCUTANEOUS at 08:57

## 2023-04-08 RX ADMIN — ACETAMINOPHEN 650 MG: 325 TABLET, FILM COATED ORAL at 13:30

## 2023-04-08 RX ADMIN — DOCUSATE SODIUM 100 MG: 100 CAPSULE, LIQUID FILLED ORAL at 08:57

## 2023-04-08 RX ADMIN — ACETAMINOPHEN 650 MG: 325 TABLET, FILM COATED ORAL at 04:52

## 2023-04-08 RX ADMIN — LEVOTHYROXINE SODIUM 50 MCG: 50 TABLET ORAL at 08:57

## 2023-04-08 RX ADMIN — IBUPROFEN 800 MG: 800 TABLET ORAL at 11:50

## 2023-04-08 RX ADMIN — ACETAMINOPHEN 650 MG: 325 TABLET, FILM COATED ORAL at 08:57

## 2023-04-08 RX ADMIN — IBUPROFEN 800 MG: 800 TABLET ORAL at 04:52

## 2023-04-08 ASSESSMENT — ACTIVITIES OF DAILY LIVING (ADL)
ADLS_ACUITY_SCORE: 18

## 2023-04-08 NOTE — CARE PLAN
Data: Vital signs within normal limits. Postpartum checks within normal limits - see flow record. Patient  Is tolerating po intake. Patient is able to empty bladder independently. . Patient ambulating independently..   No apparent signs of infection. perineum healing well. Patient Is performing self cares and Is able to care for infant. Positive attachment behaviors are observed with infant. Support persons are present.  Action:  Pain plan was discussed. Patient will request pain med when she is ready for it. Patient was medicated during the shift for pain. See MAR.Patient education done about breastfeeding,  cares, postpartum cares, pain management/plan,  safety, rest and discharge from hospital. See flow record.  Response:   Patient reassessed within 1 hour after each medication for pain. Patient stated that pain had improved. Patient stated that she was comfortable. .   Plan: Discharge on 23.

## 2023-04-08 NOTE — CARE PLAN
Patient discharged but will remain a border until  discharges.  Patient aware that she (and Prashant, SO) needs to have ROP signed/notarized at 's follow-up appt.

## 2023-04-08 NOTE — PROGRESS NOTES
[facility] OB/GYN Postpartum Note    S: Pain well controlled with current pain meds. However, when she tried stopping the ibuprofen and tylenol, cramping abdominal pain is bothersome. Lochia minimal.  Eating and drinking without nausea/vomiting.  Ambulating without difficulty.  Voiding without difficulty.  However, did have some incomplete bladder emptying sensation that resolved with ibuprofen and tylenol    O:  Patient Vitals for the past 24 hrs:   BP Temp Temp src Pulse Resp   23 2330 126/71 97.6  F (36.4  C) Oral 87 16   23 124/76 97.6  F (36.4  C) Oral 80 16   23 0900 127/70 -- -- 78 16     Gen:  NAD  CV: Well perfused  Resp: Bilateral chest rise and fall  Abd: soft, nondistended, nontender, fundus firm at 2cm below umbilicus  Ext: non-tender, trace edema, no erythema    Hemoglobin   Date Value Ref Range Status   2023 9.2 (L) 11.7 - 15.7 g/dL Final   2023 10.9 (L) 11.7 - 15.7 g/dL Final   02/10/2015 12.1 11.7 - 15.7 g/dL Final   2015 12.4 11.7 - 15.7 g/dL Final     A/P: 22 year old  PPD#2 s/p , doing well postpartum    Routine postpartum care  - Heme: Acute blood loss anemia from appropriate blood loss during delivery on mild chronic anemia. Will continue iron supplement + vitamin C + vitamin B12 upon discharge  - Pain: Well controlled on PRN  ibuprofen 800mg q6hr and PRN tylenol 650mg q4hr. Recommended scheduling ibuprofen and tylenol for the next week and can taper medication if she is feeling great.  - GI: Continue docusate 100mg daily  - : Voiding spontaneously. Will continue to monitor the incomplete bladder emptying sensation  - Rh positivie, Rubella immune, GBS neg  - PPx: Encourage ambulation    Hypothyroidism: decreased to prepregnancy synthyroid dosage - 50mcg and prescribed more 50mcg Synthroid for discharge.  TSH recheck at 6 weeks postpartum visit.    Chronic hypertension: No preeclampsia symptoms today.  Blood pressure postpartum  has been normal. BP check with us in 1 week    Disposition: discharge to home on PPD#2    Erica Maldonado MD  Obstetrics and Gynecology  [facility]   04/08/2023

## 2023-04-08 NOTE — DISCHARGE SUMMARY
[facility] OB/GYN Discharge Summary    Samantha Cody MRN# [id number]   Age: 22 year old YOB: 2000     Date of Admission:  2023  Date of Discharge:  2023  Admitting Physician:  Erica Maldonado MD  Discharge Physician:  Erica Maldonado MD     Admit Dx:   - Intrauterine pregnancy at 39w2d   - -cHTN  -Hypothyroidism: on levothyroxine 75mcg in the morning  -Pre-pregnancy BMI 43.84  -Tobacco use disorder via vaping    Discharge Dx:  - Same as above, s/p   -cHTN  -Hypothyroidism: switched back to prepregnancy and first trimester levothyroxine 50mcg in the morning  -Pre-pregnancy BMI 43.84  -Tobacco use disorder via vaping    Procedures:  - Spontaneous vaginal delivery  - Epidural analgesia    Admit HPI/Labor Course:  Samantha Cody is a 22 year old  presented at 39 weeks 1 days who was admitted to L&D for induction of labor for chronic hypertension. Her pregnancy was complicated by CHTN, hypothyroid, morbid obesity. She was started on Misoprostol and Cervical balloon placed for cervical ripening. She was then started on Pitocin after SROM. She progressed through labor until complete dilation. Patient did labor down. She pushed effectively and delivered a viable female infant with Apgars 9/9 over an intact perineum. Weight is 7#10oz and skin-to-skin was performed. Delayed cord clamping was performed. Cord was clamped and cut. Placenta delivered via active management. Trailing membranes were removed manually. Bladder was drained for 500cc which improved uterine tone.  Right periurethral laceration was repaired with 3-0 vicryl in standard fashion. QBL 500ml. Sponge and needle counts correct. Mom and baby were transported to postpartum in stable condition.   Please see her Admission H&P and Delivery Summary for further details.    Postpartum Course:  Her postpartum course was complicated by acute blood loss anemia from appropriate blood loss during delivery on mild chronic  anemia, which we recommended continuing Acute blood loss anemia from appropriate blood loss during delivery on mild chronic anemia. Her BPs intrapartum and postpartum are normal. On PPD#2, she was meeting all of her postpartum goals and deemed stable for discharge. She was voiding without difficulty, tolerating a regular diet without nausea and vomiting, her pain was well controlled on oral pain medicines and her lochia was appropriate. Her hemoglobin prior to delivery was 10.9 and after delivery was 9.2. Her Rh status was positive, and Rhogam was not indicated. Patient was discharged on Levothyroxine 50mcg daily, which is her prepregnancy dosage.     Discharge Medications:     Review of your medicines      START taking      Dose / Directions   acetaminophen 325 MG tablet  Commonly known as: TYLENOL      Dose: 650 mg  Take 2 tablets (650 mg) by mouth every 6 hours as needed for mild pain Start after Delivery.  Quantity: 100 tablet  Refills: 0     ibuprofen 600 MG tablet  Commonly known as: ADVIL/MOTRIN      Dose: 600 mg  Take 1 tablet (600 mg) by mouth every 6 hours as needed for moderate pain Start after delivery  Quantity: 60 tablet  Refills: 0     senna-docusate 8.6-50 MG tablet  Commonly known as: SENOKOT-S/PERICOLACE      Dose: 1 tablet  Take 1 tablet by mouth daily Start after delivery.  Quantity: 100 tablet  Refills: 0        CONTINUE these medicines which may have CHANGED, or have new prescriptions. If we are uncertain of the size of tablets/capsules you have at home, strength may be listed as something that might have changed.      Dose / Directions   levothyroxine 25 MCG tablet  Commonly known as: SYNTHROID/LEVOTHROID  This may have changed:     medication strength    how much to take      Dose: 50 mcg  Take 2 tablets (50 mcg) by mouth daily  Quantity: 50 tablet  Refills: 3        CONTINUE these medicines which have NOT CHANGED      Dose / Directions   cyanocobalamin 1000 MCG tablet  Commonly known as:  VITAMIN B-12  Used for: Anemia during pregnancy      Dose: 1,000 mcg  Take 1 tablet (1,000 mcg) by mouth daily  Quantity: 90 tablet  Refills: 1     ferrous sulfate 325 (65 Fe) MG tablet  Commonly known as: FEROSUL  Used for: Anemia during pregnancy in third trimester      Dose: 325 mg  Take 1 tablet (325 mg) by mouth daily (with breakfast)  Quantity: 30 tablet  Refills: 3     prenatal multivitamin w/iron 27-0.8 MG tablet      Dose: 1 tablet  Take 1 tablet by mouth daily  Refills: 0     terbinafine 1 % external cream  Commonly known as: lamISIL  Used for: Tinea corporis      Apply topically 2 times daily  Quantity: 30 g  Refills: 1     vitamin C 500 MG tablet  Commonly known as: ASCORBIC ACID  Used for: Anemia during pregnancy in third trimester      Dose: 500 mg  Take 1 tablet (500 mg) by mouth daily  Quantity: 60 tablet  Refills: 3        STOP taking    aspirin 81 MG EC tablet              Where to get your medicines      These medications were sent to [id number] - [place] [address]  [address] [place] [address]    Phone: [phone]     acetaminophen 325 MG tablet    ibuprofen 600 MG tablet    levothyroxine 25 MCG tablet    senna-docusate 8.6-50 MG tablet       Discharge/Disposition:  Samantha Cody was discharged to home in stable condition with the following instructions:  -Call for temperature > 100.4, bright red vaginal bleeding >1 pad an hour x 2 hours, foul smelling vaginal discharge, pain not controlled by usual oral pain meds, persistent nausea and vomiting not controlled on medications  -For feeding she decided to breast feed.  -She was instructed to follow-up with her primary OB in 6 weeks for a routine postpartum visit and 1 week for BP check.    Erica Maldonado MD  Obstetrics and Gynecology  [facility]   04/08/2023

## 2023-04-08 NOTE — DISCHARGE INSTRUCTIONS
Warning Signs after Having a Baby    Keep this paper on your fridge or somewhere else where you can see it.    Call your provider if you have any of these symptoms up to 12 weeks after having your baby.    Thoughts of hurting yourself or your baby  Pain in your chest or trouble breathing  Severe headache not helped by pain medicine  Eyesight concerns (blurry vision, seeing spots or flashes of light, other changes to eyesight)  Fainting, shaking or other signs of a seizure    Call 9-1-1 if you feel that it is an emergency.     The symptoms below can happen to anyone after giving birth. They can be very serious. Call your provider if you have any of these warning signs.    My provider s phone number: _______________________    Losing too much blood (hemorrhage)    Call your provider if you soak through a pad in less than an hour or pass blood clots bigger than a golf ball. These may be signs that you are bleeding too much.    Blood clots in the legs or lungs    After you give birth, your body naturally clots its blood to help prevent blood loss. Sometimes this increased clotting can happen in other areas of the body, like the legs or lungs. This can block your blood flow and be very dangerous.     Call your provider if you:  Have a red, swollen spot on the back of your leg that is warm or painful when you touch it.   Are coughing up blood.     Infection    Call your provider if you have any of these symptoms:  Fever of 100.4 F (38 C) or higher.  Pain or redness around your stitches if you had an incision.   Any yellow, white, or green fluid coming from places where you had stitches or surgery.    Mood Problems (postpartum depression)    Many people feel sad or have mood changes after having a baby. But for some people, these mood swings are worse.     Call your provider right away if you feel so anxious or nervous that you can't care for yourself or your baby.    Preeclampsia (high blood pressure)    Even if you  didn't have high blood pressure when you were pregnant, you are at risk for the high blood pressure disease called preeclampsia. This risk can last up to 12 weeks after giving birth.     Call your provider if you have:   Pain on your right side under your rib cage  Sudden swelling in the hands and face    Remember: You know your body. If something doesn't feel right, get medical help.     For informational purposes only. Not to replace the advice of your health care provider. Copyright 2020 [facility]. All rights reserved. Clinically reviewed by Candi Siddiqui, RNC-OB, MSN. [employer] [id number] - Rev 02/23.

## 2023-04-11 ENCOUNTER — HOSPITAL ENCOUNTER (EMERGENCY)
Facility: CLINIC | Age: 23
Discharge: HOME | End: 2023-04-11
Attending: PHYSICIAN ASSISTANT | Admitting: PHYSICIAN ASSISTANT
Payer: COMMERCIAL

## 2023-04-11 VITALS
OXYGEN SATURATION: 98 % | HEART RATE: 101 BPM | SYSTOLIC BLOOD PRESSURE: 132 MMHG | DIASTOLIC BLOOD PRESSURE: 62 MMHG | TEMPERATURE: 98.2 F | RESPIRATION RATE: 18 BRPM

## 2023-04-11 DIAGNOSIS — R30.0 DYSURIA: ICD-10-CM

## 2023-04-11 LAB
ALBUMIN UR-MCNC: 100 MG/DL
APPEARANCE UR: ABNORMAL
BACTERIA #/AREA URNS HPF: ABNORMAL /HPF
BILIRUB UR QL STRIP: NEGATIVE
COLOR UR AUTO: ABNORMAL
COLOR UR AUTO: ABNORMAL
COLOR UR AUTO: YELLOW
GLUCOSE UR STRIP-MCNC: NEGATIVE MG/DL
HGB UR QL STRIP: ABNORMAL
KETONES UR STRIP-MCNC: NEGATIVE MG/DL
LEUKOCYTE ESTERASE UR QL STRIP: ABNORMAL
MUCOUS THREADS #/AREA URNS LPF: PRESENT /LPF
NITRATE UR QL: NEGATIVE
PH UR STRIP: 6 [PH] (ref 5–7)
RBC URINE: 4 /HPF
RBC URINE: >182 /HPF
RBC URINE: >182 /HPF
RENAL TUB EPI: <1 /HPF
SP GR UR STRIP: 1.01 (ref 1–1.03)
SQUAMOUS EPITHELIAL: 1 /HPF
SQUAMOUS EPITHELIAL: 6 /HPF
SQUAMOUS EPITHELIAL: 6 /HPF
TRANSITIONAL EPI: 2 /HPF
TRANSITIONAL EPI: 2 /HPF
UROBILINOGEN UR STRIP-MCNC: 2 MG/DL
UROBILINOGEN UR STRIP-MCNC: 2 MG/DL
UROBILINOGEN UR STRIP-MCNC: NORMAL MG/DL
WBC CLUMPS #/AREA URNS HPF: PRESENT /HPF
WBC URINE: >182 /HPF

## 2023-04-11 PROCEDURE — 87186 SC STD MICRODIL/AGAR DIL: CPT | Performed by: PHYSICIAN ASSISTANT

## 2023-04-11 PROCEDURE — G0463 HOSPITAL OUTPT CLINIC VISIT: HCPCS | Performed by: PHYSICIAN ASSISTANT

## 2023-04-11 PROCEDURE — 99213 OFFICE O/P EST LOW 20 MIN: CPT | Performed by: PHYSICIAN ASSISTANT

## 2023-04-11 PROCEDURE — 81001 URINALYSIS AUTO W/SCOPE: CPT | Performed by: PHYSICIAN ASSISTANT

## 2023-04-11 RX ORDER — CEFDINIR 300 MG/1
300 CAPSULE ORAL 2 TIMES DAILY
Qty: 14 CAPSULE | Refills: 0 | Status: ON HOLD | OUTPATIENT
Start: 2023-04-11 | End: 2023-04-14

## 2023-04-11 ASSESSMENT — ACTIVITIES OF DAILY LIVING (ADL): ADLS_ACUITY_SCORE: 35

## 2023-04-11 NOTE — ED PROVIDER NOTES
History     Chief Complaint   Patient presents with     Dysuria     HPI  Samantha Cody is a 22 year old female who presents to urgent care with concern over possible urinary tract infection.  Patient had normal spontaneous vaginal delivery 23, discharged from hospital 23.  Since then she has complained of dysuria.  She has also noted some urinary urgency, possible urinary frequency and states that she did have a fever up to 101 with chills after having a warm bath yesterday.  She has been on continuous antipyretics with tylenol/ibuprorfen.  She denies any nasal congestion, cough, dyspnea, wheezing, nausea, vomiting.  She does have some lower abdominal/pelvic pain and vaginal discharge which she states is consistent or better than what she anticipated following delivery.      Allergies:  No Known Allergies    Problem List:    Patient Active Problem List    Diagnosis Date Noted     Chronic hypertension affecting pregnancy 2023     Priority: Medium      (spontaneous vaginal delivery) 2023     Priority: Medium     Chronic hypertension in pregnancy 2023     Priority: Medium     Acquired hypothyroidism 2023     Priority: Medium     Morbid obesity (H) 2022     Priority: Medium     Multiple joint pain 2015     Priority: Medium      Past Medical History:    Past Medical History:   Diagnosis Date     Hypothyroidism      Past Surgical History:    Past Surgical History:   Procedure Laterality Date     NO HISTORY OF SURGERY       Family History:    Family History   Problem Relation Age of Onset     Hypertension Mother      Hypertension Father      Thyroid Disease Paternal Grandmother      Gynecology Other         ovarian cancer age:25 -paternal aunt     Social History:  Marital Status:  Single [1]  Social History     Tobacco Use     Smoking status: Every Day     Types: Vaping Device     Smokeless tobacco: Never     Tobacco comments:     cutting down with pregnancy   Vaping  Use     Vaping status: Every Day     Substances: Nicotine     Devices: Disposable   Substance Use Topics     Alcohol use: Not Currently     Drug use: No      Medications:    levothyroxine (SYNTHROID/LEVOTHROID) 25 MCG tablet  acetaminophen (TYLENOL) 325 MG tablet  cyanocobalamin (VITAMIN B-12) 1000 MCG tablet  ferrous sulfate (FEROSUL) 325 (65 Fe) MG tablet  ibuprofen (ADVIL/MOTRIN) 600 MG tablet  Prenatal Vit-Fe Fumarate-FA (PRENATAL MULTIVITAMIN W/IRON) 27-0.8 MG tablet  senna-docusate (SENOKOT-S/PERICOLACE) 8.6-50 MG tablet  terbinafine (LAMISIL) 1 % external cream  vitamin C (ASCORBIC ACID) 500 MG tablet      Review of Systems  CONSTITUTIONAL:POSITIVE  for fever up to 101, chills, myalgias   INTEGUMENTARY/SKIN: NEGATIVE for worrisome rashes, moles or lesions  EYES: NEGATIVE for vision changes or irritation  ENT/MOUTH: NEGATIVE for ear, mouth and throat problems  RESP:NEGATIVE for significant cough or SOB  GI: POSITIVE for abdominal discomfort and NEGATIVE for nausea, vomiting, diarrhea, melena, hematochezia  :  POSITIVE for dysuria, urinary frequency, urgency, vaginal bleeding  Physical Exam   BP: 132/62  Pulse: 101  Temp: 98.2  F (36.8  C)  Resp: 18  SpO2: 98 %  Physical Exam  GENERAL APPEARANCE: healthy, alert and no distress  RESP: lungs clear to auscultation - no rales, rhonchi or wheezes  CV: regular rates and rhythm, normal S1 S2, no murmur noted  ABDOMEN:  soft, minimal pelvic/lower abdominal tenderness to palpation, no guarding, no masses and bowel sounds normal  BACK: No CVA tenderness  GU_female: deferred  SKIN: no suspicious lesions or rashes  ED Course           Procedures       Critical Care time:  none        Results for orders placed or performed during the hospital encounter of 04/11/23   UA with Microscopic reflex to Culture     Status: Abnormal    Specimen: Urine, Midstream   Result Value Ref Range    Color Urine Jo (A) Colorless, Straw, Light Yellow, Yellow    Appearance Urine Cloudy (A)  Clear    Glucose Urine Negative Negative mg/dL    Bilirubin Urine Negative Negative    Ketones Urine Negative Negative mg/dL    Specific Gravity Urine 1.014 1.003 - 1.035    Blood Urine Large (A) Negative    pH Urine 6.0 5.0 - 7.0    Protein Albumin Urine 100 (A) Negative mg/dL    Urobilinogen Urine 2.0 Normal, 2.0 mg/dL    Nitrite Urine Negative Negative    Leukocyte Esterase Urine Large (A) Negative    Bacteria Urine Many (A) None Seen /HPF    WBC Clumps Urine Present (A) None Seen /HPF    Mucus Urine Present (A) None Seen /LPF    RBC Urine >182 (H) <=2 /HPF    WBC Urine >182 (H) <=5 /HPF    Squamous Epithelials Urine 6 (H) <=1 /HPF    Transitional Epithelials Urine 2 (H) <=1 /HPF    Narrative    Urine Culture ordered based on laboratory criteria   UA reflex to Microscopic     Status: Abnormal   Result Value Ref Range    Color Urine Jo (A) Colorless, Straw, Light Yellow, Yellow    Appearance Urine Cloudy (A) Clear    Glucose Urine Negative Negative mg/dL    Bilirubin Urine Negative Negative    Ketones Urine Negative Negative mg/dL    Specific Gravity Urine 1.014 1.003 - 1.035    Blood Urine Large (A) Negative    pH Urine 6.0 5.0 - 7.0    Protein Albumin Urine 100 (A) Negative mg/dL    Urobilinogen Urine 2.0 Normal, 2.0 mg/dL    Nitrite Urine Negative Negative    Leukocyte Esterase Urine Large (A) Negative    Bacteria Urine Many (A) None Seen /HPF    RBC Urine >182 (H) <=2 /HPF    WBC Urine >182 (H) <=5 /HPF    Squamous Epithelials Urine 6 (H) <=1 /HPF    WBC Clumps Urine Present (A) None Seen /HPF    Mucus Urine Present (A) None Seen /LPF    Transitional Epithelials Urine 2 (H) <=1 /HPF   UA with Microscopic     Status: Abnormal   Result Value Ref Range    Color Urine Yellow Colorless, Straw, Light Yellow, Yellow    Appearance Urine Cloudy (A) Clear    Glucose Urine Negative Negative mg/dL    Bilirubin Urine Negative Negative    Ketones Urine Negative Negative mg/dL    Specific Gravity Urine 1.014 1.003 - 1.035     Blood Urine Moderate (A) Negative    pH Urine 6.0 5.0 - 7.0    Protein Albumin Urine 100 (A) Negative mg/dL    Urobilinogen Urine Normal Normal, 2.0 mg/dL    Nitrite Urine Negative Negative    Leukocyte Esterase Urine Large (A) Negative    Bacteria Urine Moderate (A) None Seen /HPF    WBC Clumps Urine Present (A) None Seen /HPF    Mucus Urine Present (A) None Seen /LPF    RBC Urine 4 (H) <=2 /HPF    WBC Urine >182 (H) <=5 /HPF    Squamous Epithelials Urine 1 <=1 /HPF    Renal Tubular Epithelials Urine <1 None Seen /HPF     Medications - No data to display    Assessments & Plan (with Medical Decision Making)     I have reviewed the nursing notes.  I have reviewed the findings, diagnosis, plan and need for follow up with the patient.       Discharge Medication List as of 4/11/2023  5:03 PM        Final diagnoses:   Dysuria     22-year-old female who is 6 days post vaginal delivery presents to the urgent care with concern over suspected urinary tract infection given dysuria, increased urinary frequency, urgency and reported fever up to 101 with chills yesterday.  She had mildly elevated heart rate upon arrival, remainder vital signs are stable.  Physical exam findings described above are significant for minimal pelvic/abdominal tenderness palpation without rebound or guarding.   exam was deferred.  Patient initially had urinalysis ordered prior to my examination which was positive for evidence of infection however was grossly contaminated by lochia.  I discussed case with OB/GYN on-call Dr. Marshall who recommended obtaining cath UA specimen to assess for presence of infection.  If test results are positive, symptoms do not sound concerning for endometritis and would not require further evaluation.  Did order repeat urinalysis with cath specimen however due to laboratory error repeat test was done on her prior contaminated urine.  Due to extended wait time patient ultimately left the department prior to results  becoming available with initial diagnosis of dysuria.  Once catheterized specimen results were available patient was contacted by phone to noted that they were indicative of infection and given presence of fever at home concern for her pyelonephritis.  She was instructed to initiate Omnicef 300 mg BID X 7 days, prescription sent to pharmacy.  She was instructed to follow up if no resolution of urinary symptoms in 48-72 hours, if persistent fevers if any other new or worsening symptoms develop.     Disclaimer: This note consists of symbols derived from keyboarding, dictation, and/or voice recognition software. As a result, there may be errors in the script that have gone undetected.  Please consider this when interpreting information found in the chart.      4/11/2023   [facility] EMERGENCY DEPT     Jo Jordan PA-C  04/14/23 [id number]

## 2023-04-11 NOTE — ADDENDUM NOTE
Addendum  created 04/11/23 1439 by Minh Tamayo APRN CRNA    Intraprocedure Event edited, Intraprocedure Staff edited

## 2023-04-12 ENCOUNTER — HOSPITAL ENCOUNTER (INPATIENT)
Facility: CLINIC | Age: 23
LOS: 2 days | Discharge: HOME | DRG: 776 | End: 2023-04-14
Attending: EMERGENCY MEDICINE | Admitting: INTERNAL MEDICINE
Payer: COMMERCIAL

## 2023-04-12 ENCOUNTER — APPOINTMENT (OUTPATIENT)
Dept: CT IMAGING | Facility: CLINIC | Age: 23
DRG: 776 | End: 2023-04-12
Attending: EMERGENCY MEDICINE
Payer: COMMERCIAL

## 2023-04-12 DIAGNOSIS — A41.9 UNSPECIFIED SEPTICEMIA(038.9) (H): ICD-10-CM

## 2023-04-12 DIAGNOSIS — N10 PYELONEPHRITIS, ACUTE: ICD-10-CM

## 2023-04-12 DIAGNOSIS — A41.9 SEPSIS, DUE TO UNSPECIFIED ORGANISM, UNSPECIFIED WHETHER ACUTE ORGAN DYSFUNCTION PRESENT (H): ICD-10-CM

## 2023-04-12 LAB
ALBUMIN SERPL BCG-MCNC: 2.7 G/DL (ref 3.5–5.2)
ALBUMIN UR-MCNC: 100 MG/DL
ALP SERPL-CCNC: 230 U/L (ref 35–104)
ALT SERPL W P-5'-P-CCNC: 13 U/L (ref 10–35)
ANION GAP SERPL CALCULATED.3IONS-SCNC: 16 MMOL/L (ref 7–15)
APPEARANCE UR: ABNORMAL
AST SERPL W P-5'-P-CCNC: 19 U/L (ref 10–35)
BACTERIA #/AREA URNS HPF: ABNORMAL /HPF
BACTERIA UR CULT: ABNORMAL
BASOPHILS # BLD AUTO: 0 10E3/UL (ref 0–0.2)
BASOPHILS NFR BLD AUTO: 0 %
BILIRUB SERPL-MCNC: 1 MG/DL
BILIRUB UR QL STRIP: NEGATIVE
BUN SERPL-MCNC: 16.4 MG/DL (ref 6–20)
CALCIUM SERPL-MCNC: 8.3 MG/DL (ref 8.6–10)
CHLORIDE SERPL-SCNC: 103 MMOL/L (ref 98–107)
COLOR UR AUTO: YELLOW
CREAT SERPL-MCNC: 1.21 MG/DL (ref 0.51–0.95)
DEPRECATED HCO3 PLAS-SCNC: 17 MMOL/L (ref 22–29)
EOSINOPHIL # BLD AUTO: 0 10E3/UL (ref 0–0.7)
EOSINOPHIL NFR BLD AUTO: 0 %
ERYTHROCYTE [DISTWIDTH] IN BLOOD BY AUTOMATED COUNT: 16.6 % (ref 10–15)
GFR SERPL CREATININE-BSD FRML MDRD: 65 ML/MIN/1.73M2
GLUCOSE SERPL-MCNC: 109 MG/DL (ref 70–99)
GLUCOSE UR STRIP-MCNC: NEGATIVE MG/DL
HCT VFR BLD AUTO: 29.3 % (ref 35–47)
HGB BLD-MCNC: 9.6 G/DL (ref 11.7–15.7)
HGB UR QL STRIP: ABNORMAL
IMM GRANULOCYTES # BLD: 0.2 10E3/UL
IMM GRANULOCYTES NFR BLD: 2 %
KETONES UR STRIP-MCNC: NEGATIVE MG/DL
LACTATE SERPL-SCNC: 0.8 MMOL/L (ref 0.7–2)
LACTATE SERPL-SCNC: 2.1 MMOL/L (ref 0.7–2)
LEUKOCYTE ESTERASE UR QL STRIP: ABNORMAL
LYMPHOCYTES # BLD AUTO: 0.2 10E3/UL (ref 0.8–5.3)
LYMPHOCYTES NFR BLD AUTO: 2 %
MCH RBC QN AUTO: 26.6 PG (ref 26.5–33)
MCHC RBC AUTO-ENTMCNC: 32.8 G/DL (ref 31.5–36.5)
MCV RBC AUTO: 81 FL (ref 78–100)
MONOCYTES # BLD AUTO: 0.1 10E3/UL (ref 0–1.3)
MONOCYTES NFR BLD AUTO: 1 %
MUCOUS THREADS #/AREA URNS LPF: PRESENT /LPF
NEUTROPHILS # BLD AUTO: 7.8 10E3/UL (ref 1.6–8.3)
NEUTROPHILS NFR BLD AUTO: 95 %
NITRATE UR QL: NEGATIVE
NRBC # BLD AUTO: 0 10E3/UL
NRBC BLD AUTO-RTO: 0 /100
PH UR STRIP: 6 [PH] (ref 5–7)
PLATELET # BLD AUTO: 118 10E3/UL (ref 150–450)
POTASSIUM SERPL-SCNC: 4 MMOL/L (ref 3.4–5.3)
PROT SERPL-MCNC: 5.7 G/DL (ref 6.4–8.3)
RBC # BLD AUTO: 3.61 10E6/UL (ref 3.8–5.2)
RBC URINE: 13 /HPF
SODIUM SERPL-SCNC: 136 MMOL/L (ref 136–145)
SP GR UR STRIP: 1.01 (ref 1–1.03)
SQUAMOUS EPITHELIAL: 2 /HPF
TRANSITIONAL EPI: 3 /HPF
UROBILINOGEN UR STRIP-MCNC: 4 MG/DL
WBC # BLD AUTO: 8.2 10E3/UL (ref 4–11)
WBC URINE: 93 /HPF

## 2023-04-12 PROCEDURE — 36415 COLL VENOUS BLD VENIPUNCTURE: CPT | Performed by: EMERGENCY MEDICINE

## 2023-04-12 PROCEDURE — 85025 COMPLETE CBC W/AUTO DIFF WBC: CPT | Performed by: EMERGENCY MEDICINE

## 2023-04-12 PROCEDURE — 250N000011 HC RX IP 250 OP 636: Performed by: EMERGENCY MEDICINE

## 2023-04-12 PROCEDURE — 99291 CRITICAL CARE FIRST HOUR: CPT | Mod: 25 | Performed by: EMERGENCY MEDICINE

## 2023-04-12 PROCEDURE — 87086 URINE CULTURE/COLONY COUNT: CPT | Performed by: EMERGENCY MEDICINE

## 2023-04-12 PROCEDURE — 83605 ASSAY OF LACTIC ACID: CPT | Performed by: EMERGENCY MEDICINE

## 2023-04-12 PROCEDURE — 250N000013 HC RX MED GY IP 250 OP 250 PS 637: Performed by: EMERGENCY MEDICINE

## 2023-04-12 PROCEDURE — 250N000013 HC RX MED GY IP 250 OP 250 PS 637

## 2023-04-12 PROCEDURE — 258N000003 HC RX IP 258 OP 636: Performed by: EMERGENCY MEDICINE

## 2023-04-12 PROCEDURE — 120N000001 HC R&B MED SURG/OB

## 2023-04-12 PROCEDURE — 96366 THER/PROPH/DIAG IV INF ADDON: CPT | Performed by: EMERGENCY MEDICINE

## 2023-04-12 PROCEDURE — 80053 COMPREHEN METABOLIC PANEL: CPT | Performed by: EMERGENCY MEDICINE

## 2023-04-12 PROCEDURE — 99291 CRITICAL CARE FIRST HOUR: CPT | Performed by: EMERGENCY MEDICINE

## 2023-04-12 PROCEDURE — 81001 URINALYSIS AUTO W/SCOPE: CPT | Performed by: EMERGENCY MEDICINE

## 2023-04-12 PROCEDURE — 99222 1ST HOSP IP/OBS MODERATE 55: CPT | Mod: AI | Performed by: INTERNAL MEDICINE

## 2023-04-12 PROCEDURE — 96365 THER/PROPH/DIAG IV INF INIT: CPT | Mod: 59 | Performed by: EMERGENCY MEDICINE

## 2023-04-12 PROCEDURE — 74176 CT ABD & PELVIS W/O CONTRAST: CPT

## 2023-04-12 PROCEDURE — 99207 PR APP CREDIT; MD BILLING SHARED VISIT: CPT

## 2023-04-12 PROCEDURE — 87149 DNA/RNA DIRECT PROBE: CPT | Performed by: EMERGENCY MEDICINE

## 2023-04-12 PROCEDURE — 87077 CULTURE AEROBIC IDENTIFY: CPT | Performed by: EMERGENCY MEDICINE

## 2023-04-12 PROCEDURE — 96361 HYDRATE IV INFUSION ADD-ON: CPT | Performed by: EMERGENCY MEDICINE

## 2023-04-12 RX ORDER — NALOXONE HYDROCHLORIDE 0.4 MG/ML
0.2 INJECTION, SOLUTION INTRAMUSCULAR; INTRAVENOUS; SUBCUTANEOUS
Status: DISCONTINUED | OUTPATIENT
Start: 2023-04-12 | End: 2023-04-14 | Stop reason: HOSPADM

## 2023-04-12 RX ORDER — ONDANSETRON 4 MG/1
4 TABLET, ORALLY DISINTEGRATING ORAL EVERY 6 HOURS PRN
Status: DISCONTINUED | OUTPATIENT
Start: 2023-04-12 | End: 2023-04-14 | Stop reason: HOSPADM

## 2023-04-12 RX ORDER — NALOXONE HYDROCHLORIDE 0.4 MG/ML
0.4 INJECTION, SOLUTION INTRAMUSCULAR; INTRAVENOUS; SUBCUTANEOUS
Status: DISCONTINUED | OUTPATIENT
Start: 2023-04-12 | End: 2023-04-12

## 2023-04-12 RX ORDER — FERROUS SULFATE 325(65) MG
325 TABLET ORAL EVERY 24 HOURS
Status: DISCONTINUED | OUTPATIENT
Start: 2023-04-13 | End: 2023-04-14 | Stop reason: HOSPADM

## 2023-04-12 RX ORDER — ACETAMINOPHEN 500 MG
1000 TABLET ORAL ONCE
Status: COMPLETED | OUTPATIENT
Start: 2023-04-12 | End: 2023-04-12

## 2023-04-12 RX ORDER — CEFTRIAXONE 2 G/1
2 INJECTION, POWDER, FOR SOLUTION INTRAMUSCULAR; INTRAVENOUS ONCE
Status: COMPLETED | OUTPATIENT
Start: 2023-04-12 | End: 2023-04-12

## 2023-04-12 RX ORDER — LEVOTHYROXINE SODIUM 50 UG/1
50 TABLET ORAL DAILY
Status: DISCONTINUED | OUTPATIENT
Start: 2023-04-12 | End: 2023-04-14 | Stop reason: HOSPADM

## 2023-04-12 RX ORDER — VANCOMYCIN HYDROCHLORIDE 1 G/200ML
1000 INJECTION, SOLUTION INTRAVENOUS EVERY 12 HOURS
Status: DISCONTINUED | OUTPATIENT
Start: 2023-04-12 | End: 2023-04-12

## 2023-04-12 RX ORDER — CEFTRIAXONE 2 G/1
2 INJECTION, POWDER, FOR SOLUTION INTRAMUSCULAR; INTRAVENOUS EVERY 24 HOURS
Status: DISCONTINUED | OUTPATIENT
Start: 2023-04-13 | End: 2023-04-14 | Stop reason: HOSPADM

## 2023-04-12 RX ORDER — NALOXONE HYDROCHLORIDE 0.4 MG/ML
0.2 INJECTION, SOLUTION INTRAMUSCULAR; INTRAVENOUS; SUBCUTANEOUS
Status: DISCONTINUED | OUTPATIENT
Start: 2023-04-12 | End: 2023-04-12

## 2023-04-12 RX ORDER — ACETAMINOPHEN 325 MG/1
650 TABLET ORAL EVERY 4 HOURS PRN
Status: DISCONTINUED | OUTPATIENT
Start: 2023-04-12 | End: 2023-04-14 | Stop reason: HOSPADM

## 2023-04-12 RX ORDER — LEVOTHYROXINE SODIUM 75 UG/1
75 TABLET ORAL DAILY
COMMUNITY
End: 2023-07-07

## 2023-04-12 RX ORDER — LEVOTHYROXINE SODIUM 75 UG/1
75 TABLET ORAL DAILY
Status: DISCONTINUED | OUTPATIENT
Start: 2023-04-12 | End: 2023-04-12

## 2023-04-12 RX ORDER — AMOXICILLIN 250 MG
1 CAPSULE ORAL DAILY
Status: DISCONTINUED | OUTPATIENT
Start: 2023-04-12 | End: 2023-04-14 | Stop reason: HOSPADM

## 2023-04-12 RX ORDER — NALOXONE HYDROCHLORIDE 0.4 MG/ML
0.4 INJECTION, SOLUTION INTRAMUSCULAR; INTRAVENOUS; SUBCUTANEOUS
Status: DISCONTINUED | OUTPATIENT
Start: 2023-04-12 | End: 2023-04-14 | Stop reason: HOSPADM

## 2023-04-12 RX ORDER — OXYCODONE AND ACETAMINOPHEN 5; 325 MG/1; MG/1
1-2 TABLET ORAL EVERY 4 HOURS PRN
Status: DISCONTINUED | OUTPATIENT
Start: 2023-04-12 | End: 2023-04-14 | Stop reason: HOSPADM

## 2023-04-12 RX ORDER — ONDANSETRON 2 MG/ML
4 INJECTION INTRAMUSCULAR; INTRAVENOUS EVERY 6 HOURS PRN
Status: DISCONTINUED | OUTPATIENT
Start: 2023-04-12 | End: 2023-04-14 | Stop reason: HOSPADM

## 2023-04-12 RX ORDER — SODIUM CHLORIDE 9 MG/ML
1000 INJECTION, SOLUTION INTRAVENOUS CONTINUOUS
Status: DISCONTINUED | OUTPATIENT
Start: 2023-04-12 | End: 2023-04-12 | Stop reason: ALTCHOICE

## 2023-04-12 RX ORDER — LANOLIN ALCOHOL/MO/W.PET/CERES
1000 CREAM (GRAM) TOPICAL DAILY
Status: DISCONTINUED | OUTPATIENT
Start: 2023-04-12 | End: 2023-04-12

## 2023-04-12 RX ORDER — ACETAMINOPHEN 325 MG/1
650 TABLET ORAL EVERY 6 HOURS PRN
Status: DISCONTINUED | OUTPATIENT
Start: 2023-04-12 | End: 2023-04-12

## 2023-04-12 RX ORDER — HYDROMORPHONE HYDROCHLORIDE 1 MG/ML
.3-.5 INJECTION, SOLUTION INTRAMUSCULAR; INTRAVENOUS; SUBCUTANEOUS
Status: DISCONTINUED | OUTPATIENT
Start: 2023-04-12 | End: 2023-04-12

## 2023-04-12 RX ORDER — SODIUM CHLORIDE 9 MG/ML
INJECTION, SOLUTION INTRAVENOUS CONTINUOUS
Status: DISCONTINUED | OUTPATIENT
Start: 2023-04-12 | End: 2023-04-14

## 2023-04-12 RX ADMIN — SODIUM CHLORIDE 1000 ML: 9 INJECTION, SOLUTION INTRAVENOUS at 11:45

## 2023-04-12 RX ADMIN — SODIUM CHLORIDE 1000 ML: 9 INJECTION, SOLUTION INTRAVENOUS at 06:38

## 2023-04-12 RX ADMIN — ACETAMINOPHEN 1000 MG: 500 TABLET ORAL at 06:41

## 2023-04-12 RX ADMIN — VANCOMYCIN HYDROCHLORIDE 1500 MG: 5 INJECTION, POWDER, LYOPHILIZED, FOR SOLUTION INTRAVENOUS at 07:17

## 2023-04-12 RX ADMIN — ACETAMINOPHEN 650 MG: 325 TABLET, FILM COATED ORAL at 17:59

## 2023-04-12 RX ADMIN — ACETAMINOPHEN 650 MG: 325 TABLET, FILM COATED ORAL at 23:17

## 2023-04-12 RX ADMIN — SODIUM CHLORIDE 1000 ML: 9 INJECTION, SOLUTION INTRAVENOUS at 07:38

## 2023-04-12 RX ADMIN — VANCOMYCIN HYDROCHLORIDE 1000 MG: 1 INJECTION, SOLUTION INTRAVENOUS at 20:01

## 2023-04-12 RX ADMIN — LEVOTHYROXINE SODIUM 50 MCG: 50 TABLET ORAL at 14:54

## 2023-04-12 RX ADMIN — FERROUS SULFATE TAB 325 MG (65 MG ELEMENTAL FE) 325 MG: 325 (65 FE) TAB at 23:17

## 2023-04-12 RX ADMIN — SODIUM CHLORIDE 1000 ML: 9 INJECTION, SOLUTION INTRAVENOUS at 09:35

## 2023-04-12 RX ADMIN — SODIUM CHLORIDE 500 ML: 9 INJECTION, SOLUTION INTRAVENOUS at 08:42

## 2023-04-12 RX ADMIN — SODIUM CHLORIDE: 9 INJECTION, SOLUTION INTRAVENOUS at 14:16

## 2023-04-12 RX ADMIN — CEFTRIAXONE SODIUM 2 G: 2 INJECTION, POWDER, FOR SOLUTION INTRAMUSCULAR; INTRAVENOUS at 06:38

## 2023-04-12 ASSESSMENT — ACTIVITIES OF DAILY LIVING (ADL)
ADLS_ACUITY_SCORE: 18
ADLS_ACUITY_SCORE: 35
ADLS_ACUITY_SCORE: 18
ADLS_ACUITY_SCORE: 35
ADLS_ACUITY_SCORE: 18

## 2023-04-12 NOTE — PROGRESS NOTES
Wants boyfriend to spend night tonight  Wants boyfriend and baby to spend night tomorrow if still here    States she is hormonal, misses baby and is crying.    Consulted security-boyfriend can only spend night if baby here.  Patient upset.  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    Approval granted from [employer] to allow boyfriend to spend night without baby to assist in breast pumping.  Security and  aware of approval from [employer].  Sticky note placed in chart.

## 2023-04-12 NOTE — ED TRIAGE NOTES
"Patient has c/o chills starting this morning. Pt states she was seen yesterday in UC and placed on ABX for \"kidney infection\". Pt has c/o dizziness and headache also. Pt 6 days postpartum.       "

## 2023-04-12 NOTE — PHARMACY-VANCOMYCIN DOSING SERVICE
Pharmacy Vancomycin Initial Note  Date of Service 2023  Patient's  2000  22 year old, female    Indication: Sepsis and Urinary Tract Infection    Current estimated CrCl = Estimated Creatinine Clearance: 101.1 mL/min (A) (based on SCr of 1.21 mg/dL (H)).    Creatinine for last 3 days  2023:  6:03 AM Creatinine 1.21 mg/dL    Recent Vancomycin Level(s) for last 3 days  No results found for requested labs within last 3 days.      Vancomycin IV Administrations (past 72 hours)      No vancomycin orders with administrations in past 72 hours.                Nephrotoxins and other renal medications (From now, onward)    None          Contrast Orders - past 72 hours (72h ago, onward)    None          [employer] Prediction of Planned Initial Vancomycin Regimen  Loading dose: 1500 mg at 07:00 2023.  Regimen: 1000 mg IV every 12 hours.  Start time: 07:00 on 2023  Exposure target: AUC24 (range)400-600 mg/L.hr   AUC24,ss: 524 mg/L.hr  Probability of AUC24 > 400: 70 %  Ctrough,ss: 15.4 mg/L  Probability of Ctrough,ss > 20: 36 %  Probability of nephrotoxicity (Lodise RHIANNON ): 11 %          Plan:  1. Start vancomycin  1500 mg IV load then 1000mg IV  q12h.   2. Vancomycin monitoring method: AUC  3. Vancomycin therapeutic monitoring goal: 400-600 mg*h/L  4. Pharmacy will check vancomycin levels as appropriate in 1-3 Days.    5. Serum creatinine levels will be ordered daily for the first week of therapy and at least twice weekly for subsequent weeks.      Flor Mariano [facility]

## 2023-04-12 NOTE — H&P
"[facility]    History and Physical  Hospital Medicine       Date of Admission:  4/12/2023  Date of Service: 4/12/2023     Assessment & Plan   Samantha Cody is a 22 year old female that presented on 4/12/2023 with sepsis likely secondary to pyelonephritis of right kidney.     Sepsis; likely secondary to pyelonephritis  - Patient presented to ED tachycardic and febrile with a urinary source of infection. UA shows UTI with culture growing E.coli. Continue ceftriaxone IV and discontinue Vancomycin. Will consult with pharmacy for safe oral medication during lactation after discharge. Patient is no longer febrile or tachycardic. Lactic acid normalized from 2.1.    Acute Kidney Injury  - Increased creatinine 1.21, baseline 0.60 and last checked 7 days ago.  - Continue on IV fluids and repeat CBC in AM.    Mild Anemia  - Recent delivery complicated by acute blood loss likely source of anemia. Will continue to monitor anemia with AM CBC.    Hypothyroidism  - Continue on levothyroxine 50mg            # Hypoalbuminemia: Lowest albumin = 2.7 g/dL at 4/12/2023  6:03 AM, will monitor as appropriate   # Thrombocytopenia: Lowest platelets = 118 in last 2 days, will monitor for bleeding  # Acute Kidney Injury, unspecified: based on a >150% or 0.3 mg/dL increase in last creatinine compared to past 90 day average, will monitor renal function       # Severe Obesity: Estimated body mass index is 43.16 kg/m  as calculated from the following:    Height as of 3/22/23: 1.702 m (5' 7\").    Weight as of this encounter: 125 kg (275 lb 9.2 oz).              Diet: Advance Diet as Tolerated: Regular Diet Adult    DVT Prophylaxis: Low Risk/Ambulatory with no VTE prophylaxis indicated  Cannon Catheter: Not present  Code Status:   Lines: None    Disposition Plan      Expected Discharge Date: 04/14/2023             Entered: PASTORA CORTES PA-C 04/12/2023, 7:30 PM       The patient's care was discussed with the " Attending Physician, Dr. Carlin.  I have discussed patient and formulated plan with attending hospitalist physician, PASTORA Collins PA-C        Primary Care Physician   Brett, Ivana Kulkarni [phone]    History is obtained from the patient and review of old records via the EMR.    History of Present Illness   Samantha Cody is a 22 year old  female with past medical history of hypothyroidism, morbid obesity, tobacco use disorder via vaping, and cHTN during pregnancy, status postpartum 6 days (23) now presents on 2023 with sepsis likely secondary to pyelonephritis of right kidney. She states having a normal vaginal delivery. Review of charts notes that her postpartum course was complicated by acute blood loss anemia. She states having ongoing worsening chills since the day following her delivery and notes that she has been febrile at home, particularly after a warm bath. Yesterday she was seen in the urgent care for complaints of dysuria and was started on antibiotic cefdinir for UTI. This AM the patient states one episode of emesis with ongoing chills and fever. Upon arrival at the ED she was tachycardic and febrile. UA performed in the ED shows bacterial infection and CT showed perinephric and periureteral stranding on the right kidney that is suggestive of  Pyelonephritis. She was started on IV ceftriaxone, vancomycin, an fluids in the ED.    Review of Systems   GENERAL: Positive for fever and chills.   CARDIOVASCULAR: Negative for chest pain and dyspnea.  RESPIRATORY: Negative for SOB, cough, and wheezing.   GASTROINTESTINAL: Negative for nausea, vomiting, change in bowel habits, diarrhea and constipation.  GENITOURINARY: Positive for frequency, dysuria and vaginal discharge and abdominal pain.    Past Medical History    Past Medical History:   Diagnosis Date     VALENTINO (acute kidney injury) (H)      Anemia due to blood loss, acute      Hypothyroidism      Pyelonephritis       Sepsis (H)      Patient Active Problem List    Diagnosis Date Noted     Pyelonephritis, acute 2023     Priority: Medium     Sepsis, due to unspecified organism, unspecified whether acute organ dysfunction present (H) 2023     Priority: Medium     Chronic hypertension affecting pregnancy 2023     Priority: Medium      (spontaneous vaginal delivery) 2023     Priority: Medium     Chronic hypertension in pregnancy 2023     Priority: Medium     Acquired hypothyroidism 2023     Priority: Medium     Morbid obesity (H) 2022     Priority: Medium     Multiple joint pain 2015     Priority: Medium        Past Surgical History   None  Past Surgical History:   Procedure Laterality Date     NO HISTORY OF SURGERY          Prior to Admission Medications   Prior to Admission Medications   Prescriptions Last Dose Informant Patient Reported? Taking?   acetaminophen (TYLENOL) 325 MG tablet Past Month Self No Yes   Sig: Take 2 tablets (650 mg) by mouth every 6 hours as needed for mild pain Start after Delivery.   cefdinir (OMNICEF) 300 MG capsule 2023 at 2000 Self No Yes   Sig: Take 1 capsule (300 mg) by mouth 2 times daily for 7 days   cyanocobalamin (VITAMIN B-12) 1000 MCG tablet Past Week Self No Yes   Sig: Take 1 tablet (1,000 mcg) by mouth daily   ferrous sulfate (FEROSUL) 325 (65 Fe) MG tablet Past Week Self No Yes   Sig: Take 1 tablet (325 mg) by mouth daily (with breakfast)   ibuprofen (ADVIL/MOTRIN) 600 MG tablet 2023 at 0430 Self No Yes   Sig: Take 1 tablet (600 mg) by mouth every 6 hours as needed for moderate pain Start after delivery   levothyroxine (SYNTHROID/LEVOTHROID) 25 MCG tablet  Self No No   Sig: Take 2 tablets (50 mcg) by mouth daily   levothyroxine (SYNTHROID/LEVOTHROID) 75 MCG tablet 2023 Self Yes Yes   Sig: Take 75 mcg by mouth daily   senna-docusate (SENOKOT-S/PERICOLACE) 8.6-50 MG tablet  Self No No   Sig: Take 1 tablet by mouth daily Start after  delivery.   vitamin C (ASCORBIC ACID) 500 MG tablet Past Week Self No Yes   Sig: Take 1 tablet (500 mg) by mouth daily      Facility-Administered Medications: None     Allergies   No Known Allergies    Family History    Family History   Problem Relation Age of Onset     Hypertension Mother      Hypertension Father      Thyroid Disease Paternal Grandmother      Gynecology Other         ovarian cancer age:25 -paternal aunt       Social History   Social History     Socioeconomic History     Marital status: Single     Spouse name: Not on file     Number of children: Not on file     Years of education: Not on file     Highest education level: Not on file   Occupational History     Not on file   Tobacco Use     Smoking status: Every Day     Types: Vaping Device     Smokeless tobacco: Never     Tobacco comments:     cutting down with pregnancy   Vaping Use     Vaping status: Every Day     Substances: Nicotine     Devices: Disposable   Substance and Sexual Activity     Alcohol use: Not Currently     Drug use: No     Sexual activity: Yes     Partners: Male   Other Topics Concern     Not on file   Social History Narrative     Not on file     Social Determinants of Health     Financial Resource Strain: Not on file   Food Insecurity: Not on file   Transportation Needs: Not on file   Physical Activity: Not on file   Stress: Not on file   Social Connections: Not on file   Intimate Partner Violence: Not on file   Housing Stability: Not on file       Physical Exam   /65 (BP Location: Left arm)   Pulse 96   Temp 98  F (36.7  C) (Oral)   Resp 18   Wt 125 kg (275 lb 9.2 oz)   LMP 06/06/2022   SpO2 100%   BMI 43.16 kg/m       Weight: 275 lbs 9.2 oz Body mass index is 43.16 kg/m .     Constitutional: Alert and oriented, non-toxic appearing, and sitting upright in bed pumping breast milk.   Cardiovascular: Clear to auscultation bilaterally and normal respiratory effort.   Respiratory: Clear to auscultation bilaterally with  normal respiratory effort.   GI: Soft, obese, non-tender to palpation. No CVA tenderness bilaterally.  Skin: Warm and dry      Data   Data reviewed today:   Recent Labs   Lab 04/12/23  0603 04/07/23  0531 04/05/23 2002   WBC 8.2  --  9.3   HGB 9.6* 9.2* 10.9*   MCV 81  --  80   *  --  178     --  137   POTASSIUM 4.0  --  4.0   CHLORIDE 103  --  105   CO2 17*  --  21*   BUN 16.4  --  7.3   CR 1.21*  --  0.61   ANIONGAP 16*  --  11   PEDRITO 8.3*  --  8.9   *  --  115*   ALBUMIN 2.7*  --  3.1*   PROTTOTAL 5.7*  --  6.2*   BILITOTAL 1.0  --  <0.2   ALKPHOS 230*  --  151*   ALT 13  --  12   AST 19  --  13       Recent Results (from the past 24 hour(s))   Abd/pelvis CT - no contrast - Stone Protocol    Narrative    CT ABDOMEN AND PELVIS WITHOUT CONTRAST 4/12/2023 8:46 AM    CLINICAL HISTORY: Fever. Urinary tract infection.    TECHNIQUE: CT scan of the abdomen and pelvis was performed without IV  contrast. Multiplanar reformats were obtained. Dose reduction  techniques were used.    CONTRAST: None.    COMPARISON: None.    FINDINGS:   LOWER CHEST: The visualized lung bases are clear. Small hiatal hernia.    HEPATOBILIARY: Hepatic steatosis. No calcified gallstones.    PANCREAS: Normal.    SPLEEN: Mild splenomegaly. The spleen measures 15.9 cm in length.    ADRENAL GLANDS: Normal.    KIDNEYS/BLADDER: There is moderate perinephric and periureteral  stranding on the right, suggesting pyelonephritis. No urinary calculi.    BOWEL: No bowel obstruction. No convincing evidence for colitis or  diverticulitis. Unremarkable appendix.    PELVIC ORGANS: Enlargement of the uterus is consistent with the  postpartum state.    LYMPH NODES: No enlarged lymph nodes are identified in the abdomen or  pelvis. A few mildly prominent retroperitoneal lymph nodes are most  likely reactive.    VASCULATURE: Unremarkable.    ADDITIONAL FINDINGS: None.    MUSCULOSKELETAL: Unremarkable.      Impression    IMPRESSION:   1.  Moderate  perinephric and periureteral stranding on the right  suggest pyelonephritis. A recently passed stone could also possibly  have this appearance.  2.  Enlargement of the uterus is consistent with the postpartum state.  3.  Hepatic steatosis.  4.  Mild splenomegaly.    ROJELIO RENTERIA MD         SYSTEM ID:  [id number]

## 2023-04-12 NOTE — MEDICATION SCRIBE - ADMISSION MEDICATION HISTORY
Medication Scribe Admission Medication History    Admission medication history is complete. The information provided in this note is only as accurate as the sources available at the time of the update.    Medication reconciliation/reorder completed by provider prior to medication history? No    Information Source(s): Patient via in-person    Pertinent Information:    Changes made to PTA medication list:    Added: levothyroxine 75 mcg    Deleted: prenatal vit, lamisil    Changed: None    Medication Affordability:  Not including over the counter (OTC) medications, was there a time in the past 12 months when you did not take your medications as prescribed because of cost?: No    Allergies reviewed with patient and updates made in EHR: yes    Medication History Completed By: Sarah Julien 4/12/2023 11:14 AM    PTA Med List   Medication Sig Last Dose     acetaminophen (TYLENOL) 325 MG tablet Take 2 tablets (650 mg) by mouth every 6 hours as needed for mild pain Start after Delivery. Past Month     cefdinir (OMNICEF) 300 MG capsule Take 1 capsule (300 mg) by mouth 2 times daily for 7 days 4/11/2023 at 2000     cyanocobalamin (VITAMIN B-12) 1000 MCG tablet Take 1 tablet (1,000 mcg) by mouth daily Past Week     ferrous sulfate (FEROSUL) 325 (65 Fe) MG tablet Take 1 tablet (325 mg) by mouth daily (with breakfast) Past Week     ibuprofen (ADVIL/MOTRIN) 600 MG tablet Take 1 tablet (600 mg) by mouth every 6 hours as needed for moderate pain Start after delivery 4/12/2023 at 0430     levothyroxine (SYNTHROID/LEVOTHROID) 75 MCG tablet Take 75 mcg by mouth daily 4/11/2023     vitamin C (ASCORBIC ACID) 500 MG tablet Take 1 tablet (500 mg) by mouth daily Past Week

## 2023-04-12 NOTE — ED NOTES
"[facility]   Admission Handoff    The patient is Samantah Cody, 22 year old who arrived in the ED by CAR from home with a complaint of Chills (Patient has c/o chills starting this morning. Pt states she was seen yesterday in  and placed on ABX for \"kidney infection\". Pt has c/o dizziness and headache also. Pt 6 days postpartum. )  . The patient's current symptoms are new and during this time the symptoms have decreased. In the ED, patient was diagnosed with   Final diagnoses:   Pyelonephritis, acute - 6 day post partum   Sepsis, due to unspecified organism, unspecified whether acute organ dysfunction present (H)         Needed?: No    Allergies:  No Known Allergies    Past Medical Hx:   Past Medical History:   Diagnosis Date    Hypothyroidism        Initial vitals were: BP: 107/53  Pulse: (!) 134  Temp: (!) 102.5  F (39.2  C)  Resp: 18  SpO2: 96 %   Recent vital Signs: /76   Pulse 87   Temp 99.2  F (37.3  C) (Oral)   Resp 18   LMP 06/06/2022   SpO2 100%     Elimination Status: Continent: Yes     Activity Level: Independent    Fall Status:     Baseline Mental status: WDL  Current Mental Status changes: at basesline    Infection present or suspected this encounter: yes urinary  Sepsis suspected: Yes    Bariatric equipment needed?: No    In the ED these meds were given:   Medications   sodium chloride 0.9% infusion ( Intravenous Rate/Dose Change 4/12/23 1408)   vancomycin (VANCOCIN) 1000 mg in dextrose 5% 200 mL PREMIX (has no administration in time range)   sodium chloride 0.9% infusion ( Intravenous $New Bag 4/12/23 1416)   acetaminophen (TYLENOL) tablet 650 mg (has no administration in time range)   naloxone (NARCAN) injection 0.2 mg (has no administration in time range)     Or   naloxone (NARCAN) injection 0.4 mg (has no administration in time range)     Or   naloxone (NARCAN) injection 0.2 mg (has no administration in time range)     Or   naloxone (NARCAN) " injection 0.4 mg (has no administration in time range)   oxyCODONE-acetaminophen (PERCOCET) 5-325 MG per tablet 1-2 tablet (has no administration in time range)   HYDROmorphone (PF) (DILAUDID) injection 0.3-0.5 mg (has no administration in time range)   ondansetron (ZOFRAN ODT) ODT tab 4 mg (has no administration in time range)     Or   ondansetron (ZOFRAN) injection 4 mg (has no administration in time range)   cefTRIAXone (ROCEPHIN) 2 g vial to attach to  ml bag for ADULTS or NS 50 ml bag for PEDS (has no administration in time range)   ferrous sulfate (FEROSUL) tablet 325 mg (has no administration in time range)   levothyroxine (SYNTHROID/LEVOTHROID) tablet 50 mcg (50 mcg Oral $Given 4/12/23 1454)   senna-docusate (SENOKOT-S/PERICOLACE) 8.6-50 MG per tablet 1 tablet (1 tablet Oral Not Given 4/12/23 1455)   0.9% sodium chloride BOLUS (0 mLs Intravenous Stopped 4/12/23 0812)   acetaminophen (TYLENOL) tablet 1,000 mg (1,000 mg Oral $Given 4/12/23 0641)   cefTRIAXone (ROCEPHIN) 2 g vial to attach to  ml bag for ADULTS or NS 50 ml bag for PEDS (0 g Intravenous Stopped 4/12/23 0739)   vancomycin (VANCOCIN) 1,500 mg in sodium chloride 0.9 % 250 mL intermittent infusion (0 mg Intravenous Stopped 4/12/23 0937)   0.9% sodium chloride BOLUS (0 mLs Intravenous Stopped 4/12/23 0842)   0.9% sodium chloride BOLUS (0 mLs Intravenous Stopped 4/12/23 0929)       Drips running?  No    Home pump  No    Current LDAs: Peripheral IV: Site R upper arm; Gauge 20/ R hand; Gauge 20    Results:   Labs/Imaging  Ordered and Resulted from Time of ED Arrival Up to the Time of Departure from the ED  Results for orders placed or performed during the hospital encounter of 04/12/23 (from the past 24 hour(s))   CBC with platelets, differential    Narrative    The following orders were created for panel order CBC with platelets, differential.  Procedure                               Abnormality         Status                     ---------                                -----------         ------                     CBC with platelets and d...[[id number]]  Abnormal            Final result                 Please view results for these tests on the individual orders.   Comprehensive metabolic panel   Result Value Ref Range    Sodium 136 136 - 145 mmol/L    Potassium 4.0 3.4 - 5.3 mmol/L    Chloride 103 98 - 107 mmol/L    Carbon Dioxide (CO2) 17 (L) 22 - 29 mmol/L    Anion Gap 16 (H) 7 - 15 mmol/L    Urea Nitrogen 16.4 6.0 - 20.0 mg/dL    Creatinine 1.21 (H) 0.51 - 0.95 mg/dL    Calcium 8.3 (L) 8.6 - 10.0 mg/dL    Glucose 109 (H) 70 - 99 mg/dL    Alkaline Phosphatase 230 (H) 35 - 104 U/L    AST 19 10 - 35 U/L    ALT 13 10 - 35 U/L    Protein Total 5.7 (L) 6.4 - 8.3 g/dL    Albumin 2.7 (L) 3.5 - 5.2 g/dL    Bilirubin Total 1.0 <=1.2 mg/dL    GFR Estimate 65 >60 mL/min/1.73m2   CBC with platelets and differential   Result Value Ref Range    WBC Count 8.2 4.0 - 11.0 10e3/uL    RBC Count 3.61 (L) 3.80 - 5.20 10e6/uL    Hemoglobin 9.6 (L) 11.7 - 15.7 g/dL    Hematocrit 29.3 (L) 35.0 - 47.0 %    MCV 81 78 - 100 fL    MCH 26.6 26.5 - 33.0 pg    MCHC 32.8 31.5 - 36.5 g/dL    RDW 16.6 (H) 10.0 - 15.0 %    Platelet Count 118 (L) 150 - 450 10e3/uL    % Neutrophils 95 %    % Lymphocytes 2 %    % Monocytes 1 %    % Eosinophils 0 %    % Basophils 0 %    % Immature Granulocytes 2 %    NRBCs per 100 WBC 0 <1 /100    Absolute Neutrophils 7.8 1.6 - 8.3 10e3/uL    Absolute Lymphocytes 0.2 (L) 0.8 - 5.3 10e3/uL    Absolute Monocytes 0.1 0.0 - 1.3 10e3/uL    Absolute Eosinophils 0.0 0.0 - 0.7 10e3/uL    Absolute Basophils 0.0 0.0 - 0.2 10e3/uL    Absolute Immature Granulocytes 0.2 <=0.4 10e3/uL    Absolute NRBCs 0.0 10e3/uL   Lactic acid whole blood   Result Value Ref Range    Lactic Acid 2.1 (H) 0.7 - 2.0 mmol/L   Abd/pelvis CT - no contrast - Stone Protocol    Narrative    CT ABDOMEN AND PELVIS WITHOUT CONTRAST 4/12/2023 8:46 AM    CLINICAL HISTORY: Fever. Urinary tract  infection.    TECHNIQUE: CT scan of the abdomen and pelvis was performed without IV  contrast. Multiplanar reformats were obtained. Dose reduction  techniques were used.    CONTRAST: None.    COMPARISON: None.    FINDINGS:   LOWER CHEST: The visualized lung bases are clear. Small hiatal hernia.    HEPATOBILIARY: Hepatic steatosis. No calcified gallstones.    PANCREAS: Normal.    SPLEEN: Mild splenomegaly. The spleen measures 15.9 cm in length.    ADRENAL GLANDS: Normal.    KIDNEYS/BLADDER: There is moderate perinephric and periureteral  stranding on the right, suggesting pyelonephritis. No urinary calculi.    BOWEL: No bowel obstruction. No convincing evidence for colitis or  diverticulitis. Unremarkable appendix.    PELVIC ORGANS: Enlargement of the uterus is consistent with the  postpartum state.    LYMPH NODES: No enlarged lymph nodes are identified in the abdomen or  pelvis. A few mildly prominent retroperitoneal lymph nodes are most  likely reactive.    VASCULATURE: Unremarkable.    ADDITIONAL FINDINGS: None.    MUSCULOSKELETAL: Unremarkable.      Impression    IMPRESSION:   1.  Moderate perinephric and periureteral stranding on the right  suggest pyelonephritis. A recently passed stone could also possibly  have this appearance.  2.  Enlargement of the uterus is consistent with the postpartum state.  3.  Hepatic steatosis.  4.  Mild splenomegaly.    ROJELIO RENTERIA MD         SYSTEM ID:  [id number]   UA with Microscopic reflex to Culture    Specimen: Urine, Midstream   Result Value Ref Range    Color Urine Yellow Colorless, Straw, Light Yellow, Yellow    Appearance Urine Slightly Cloudy (A) Clear    Glucose Urine Negative Negative mg/dL    Bilirubin Urine Negative Negative    Ketones Urine Negative Negative mg/dL    Specific Gravity Urine 1.009 1.003 - 1.035    Blood Urine Large (A) Negative    pH Urine 6.0 5.0 - 7.0    Protein Albumin Urine 100 (A) Negative mg/dL    Urobilinogen Urine 4.0 (A) Normal, 2.0 mg/dL     Nitrite Urine Negative Negative    Leukocyte Esterase Urine Large (A) Negative    Bacteria Urine Few (A) None Seen /HPF    Mucus Urine Present (A) None Seen /LPF    RBC Urine 13 (H) <=2 /HPF    WBC Urine 93 (H) <=5 /HPF    Squamous Epithelials Urine 2 (H) <=1 /HPF    Transitional Epithelials Urine 3 (H) <=1 /HPF    Narrative    Urine Culture ordered based on laboratory criteria   Lactic acid whole blood   Result Value Ref Range    Lactic Acid 0.8 0.7 - 2.0 mmol/L       For the majority of the shift this patient's behavior was Green     Cardiac Rhythm: N/A  Pt needs tele? No  Skin/wound Issues: None    Code Status: Full Code    Pain control: pt had none    Nausea control: pt had none    Abnormal labs/tests/findings requiring intervention: Lactic 2.1 to 0.8 after fluid bolus and continuous fluid infusion.     Patient tested for COVID 19 prior to admission: NO    OBS brochure/video discussed/provided to patient/family: N/A     Family present during ED course? Yes     Family Comments/Social Situation comments:     Tasks needing completion: None    Bety Shaw RN

## 2023-04-12 NOTE — PROGRESS NOTES
[place] [facility] [place]    Patient admitted to room 2208 at approximately 1745 via cart from emergency room. Patient was accompanied by transport tech.     Verbal SBAR report received from ER hand off note prior to patient arrival.     Patient trasferred to bed via self. Patient alert and oriented X 3. Pain is controlled without any medications.  . Admission vital signs: Blood pressure 110/65, pulse 96, temperature 98  F (36.7  C), temperature source Oral, resp. rate 18, weight 125 kg (275 lb 9.2 oz), last menstrual period 06/06/2022, SpO2 100 %, currently breastfeeding. Patient was oriented to plan of care, call light, bed controls, tv, telephone, bathroom and visiting hours.     Risk Assessment    The following safety risks were identified during admission: none. Yellow risk band applied: NO.     Skin Initial Assessment    This writer admitted this patient and completed a full skin assessment and Rickey score in the Adult PCS flowsheet. Appropriate interventions initiated as needed.     Secondary skin check completed by patient declined.         Education    Patient has a [place] to Observation order: No  Observation education completed and documented: N/A      Magda Boo RN

## 2023-04-12 NOTE — ED PROVIDER NOTES
"  History     Chief Complaint   Patient presents with     Chills     Patient has c/o chills starting this morning. Pt states she was seen yesterday in UC and placed on ABX for \"kidney infection\". Pt has c/o dizziness and headache also. Pt 6 days postpartum.      HPI  Samantha Cody is a 22 year old female with a past medical history significant for recent spontaneous vaginal delivery on 2023 with history of pregnancy-induced hypertension hypothyroidism who presents emergency department complaining of chills body aches lightheadedness and weakness.  Patient states she was seen yesterday complaining of dysuria.  She has been having urgency and did have a fever up to 101 on 10 April.  She has been continue to take antipyretics but still spiking fevers and having occasional chills.  She has a mild headache denies any visual changes denies any neck pain has not had chest pain she has not had any shortness of breath significant cough has some cramping lower abdominal pain.  She had a small skin tear near her urethra during vaginal delivery denies any significant pain is had a small amount of bleeding which is improving.  Denies any rash has not had bowel or bladder dysfunction denies any leg or calf pain.    Allergies:  No Known Allergies    Problem List:    Patient Active Problem List    Diagnosis Date Noted     Chronic hypertension affecting pregnancy 2023     Priority: Medium      (spontaneous vaginal delivery) 2023     Priority: Medium     Chronic hypertension in pregnancy 2023     Priority: Medium     Acquired hypothyroidism 2023     Priority: Medium     Morbid obesity (H) 2022     Priority: Medium     Multiple joint pain 2015     Priority: Medium        Past Medical History:    Past Medical History:   Diagnosis Date     Hypothyroidism        Past Surgical History:    Past Surgical History:   Procedure Laterality Date     NO HISTORY OF SURGERY         Family History:  "   Family History   Problem Relation Age of Onset     Hypertension Mother      Hypertension Father      Thyroid Disease Paternal Grandmother      Gynecology Other         ovarian cancer age:25 -paternal aunt       Social History:  Marital Status:  Single [1]  Social History     Tobacco Use     Smoking status: Every Day     Types: Vaping Device     Smokeless tobacco: Never     Tobacco comments:     cutting down with pregnancy   Vaping Use     Vaping status: Every Day     Substances: Nicotine     Devices: Disposable   Substance Use Topics     Alcohol use: Not Currently     Drug use: No        Medications:    acetaminophen (TYLENOL) 325 MG tablet  cefdinir (OMNICEF) 300 MG capsule  cyanocobalamin (VITAMIN B-12) 1000 MCG tablet  ferrous sulfate (FEROSUL) 325 (65 Fe) MG tablet  ibuprofen (ADVIL/MOTRIN) 600 MG tablet  levothyroxine (SYNTHROID/LEVOTHROID) 25 MCG tablet  Prenatal Vit-Fe Fumarate-FA (PRENATAL MULTIVITAMIN W/IRON) 27-0.8 MG tablet  senna-docusate (SENOKOT-S/PERICOLACE) 8.6-50 MG tablet  terbinafine (LAMISIL) 1 % external cream  vitamin C (ASCORBIC ACID) 500 MG tablet          Review of Systems  All systems reviewed and other than pertinent positives and negatives in HPI all other systems are negative.  Physical Exam   BP: 107/53  Pulse: (!) 134  Temp: (!) 102.5  F (39.2  C)  Resp: 18  SpO2: 96 %      Physical Exam  Vitals and nursing note reviewed.   Constitutional:       Appearance: Normal appearance.   HENT:      Head: Normocephalic.      Nose: Nose normal.      Mouth/Throat:      Mouth: Mucous membranes are moist.      Pharynx: Oropharynx is clear.   Eyes:      Conjunctiva/sclera: Conjunctivae normal.   Cardiovascular:      Rate and Rhythm: Regular rhythm. Tachycardia present.      Pulses: Normal pulses.      Heart sounds: Normal heart sounds. No murmur heard.  Pulmonary:      Effort: Pulmonary effort is normal.      Breath sounds: No stridor. No wheezing or rhonchi.      Comments: Breath sounds slightly  decreased at bases  Abdominal:      General: Abdomen is flat. Bowel sounds are normal. There is no distension.      Palpations: Abdomen is soft.      Tenderness: There is no abdominal tenderness. There is no right CVA tenderness or left CVA tenderness.   Musculoskeletal:         General: No swelling or tenderness. Normal range of motion.      Cervical back: Normal range of motion and neck supple.      Right lower leg: No edema.      Left lower leg: No edema.   Skin:     General: Skin is warm and dry.      Capillary Refill: Capillary refill takes less than 2 seconds.      Findings: No rash.   Neurological:      General: No focal deficit present.      Mental Status: She is alert and oriented to person, place, and time.      Sensory: No sensory deficit.      Motor: No weakness.      Coordination: Coordination normal.   Psychiatric:         Mood and Affect: Mood normal.         ED Course              ED Course as of 04/15/23 0850   Thu Apr 13, 2023   0910 Culture(!): Positive on the 1st day of incubation     Procedures              Critical Care time:  was 40 minutes for this patient excluding procedures.  For management of sepsis.     The patient has signs of Severe Sepsis        If one the following conditions is present, a 30 mL/kg bolus is recommended as part of the 6 hour bundle (IBW can be used for BMI >30, or document refusal/contraindication):      1.   Initial hypotension  defined as 2 bps < 90 or map < 65 in the 6hrs before or 3hrs after time zero.     2.  Lactate >4.      The patient has signs of Severe Sepsis as evidenced by:    1. 2 SIRS criteria, AND  2. Suspected infection, AND   3. Organ dysfunction: Lactic Acidosis with value >2.0    Time severe sepsis diagnosis confirmed:  as this was the time when Lactate resulted, and the level was > 2.0    3 Hour Severe Sepsis Bundle Completion:    1. Initial Lactic Acid Result:   Recent Labs   Lab Test 04/12/23  0930 04/12/23  0619   LACT 0.8 2.1*     2. Blood  Cultures before Antibiotics: Yes  3. Broad Spectrum Antibiotics Administered:  yes       Anti-infectives (From admission through now)    Start     Dose/Rate Route Frequency Ordered Stop    04/13/23 0700  cefTRIAXone (ROCEPHIN) 2 g vial to attach to  ml bag for ADULTS or NS 50 ml bag for PEDS         2 g  over 30 Minutes Intravenous EVERY 24 HOURS 04/12/23 1405      04/12/23 0650  vancomycin (VANCOCIN) 1,500 mg in sodium chloride 0.9 % 250 mL intermittent infusion         1,500 mg  over 90 Minutes Intravenous ONCE 04/12/23 0649 04/12/23 0937    04/12/23 0635  cefTRIAXone (ROCEPHIN) 2 g vial to attach to  ml bag for ADULTS or NS 50 ml bag for PEDS         2 g  over 30 Minutes Intravenous ONCE 04/12/23 0633 04/12/23 0739          4. Is initial hypotension present?     no  Obese patient (BMI >30); 30 mL/kg bolus based on IBW given (see amount below).    BMI Readings from Last 1 Encounters:   04/12/23 43.16 kg/m      30 mL/kg fluids based on weight: 3,750 mL  30 mL/kg fluids based on IBW (must be >= 60 inches tall): 1,850 mL                      Severe Sepsis reassessment:  1. Repeat Lactic Acid Level within 6 hours of time zero: 0.8              Results for orders placed or performed during the hospital encounter of 04/12/23 (from the past 24 hour(s))   CBC with platelets, differential    Narrative    The following orders were created for panel order CBC with platelets, differential.  Procedure                               Abnormality         Status                     ---------                               -----------         ------                     CBC with platelets and d...[[id number]]  Abnormal            Final result                 Please view results for these tests on the individual orders.   CBC with platelets and differential   Result Value Ref Range    WBC Count 8.2 4.0 - 11.0 10e3/uL    RBC Count 3.61 (L) 3.80 - 5.20 10e6/uL    Hemoglobin 9.6 (L) 11.7 - 15.7 g/dL    Hematocrit 29.3 (L) 35.0  - 47.0 %    MCV 81 78 - 100 fL    MCH 26.6 26.5 - 33.0 pg    MCHC 32.8 31.5 - 36.5 g/dL    RDW 16.6 (H) 10.0 - 15.0 %    Platelet Count 118 (L) 150 - 450 10e3/uL    % Neutrophils 95 %    % Lymphocytes 2 %    % Monocytes 1 %    % Eosinophils 0 %    % Basophils 0 %    % Immature Granulocytes 2 %    NRBCs per 100 WBC 0 <1 /100    Absolute Neutrophils 7.8 1.6 - 8.3 10e3/uL    Absolute Lymphocytes 0.2 (L) 0.8 - 5.3 10e3/uL    Absolute Monocytes 0.1 0.0 - 1.3 10e3/uL    Absolute Eosinophils 0.0 0.0 - 0.7 10e3/uL    Absolute Basophils 0.0 0.0 - 0.2 10e3/uL    Absolute Immature Granulocytes 0.2 <=0.4 10e3/uL    Absolute NRBCs 0.0 10e3/uL           Assessments & Plan (with Medical Decision Making) records were reviewed including recent childbirth records and recent urgent care visit past medical history and medications were reviewed.  IV fluids were immediately started.  Blood cultures were drawn labs were obtained.  Patient's white count was 8.2 hemoglobin 9.6 devious hemoglobin was 9.2.  Platelet count was low at 118.  Patient's comprehensive metabolic panel significant for carbon dioxide of 16 creatinine 1.21 calcium was low at 8.3 glucose 109 alk phos elevated 230 albumin was low at 2.7.  Lactic acid was slightly elevated 2.1 IV fluids were continued plan to give roughly 3 L of normal saline.  Due to possible UTI that patient was seen for yesterday I am going to treat this as possible urosepsis and cover with ceftriaxone and vancomycin.  Due to patient's recent postpartum symptoms and her UTI a CT scan renal stone protocol was obtained.  These images were reviewed.  This revealed moderate perinephric and periurethral stranding on the right suggesting pyelonephritis enlarged uterus is consistent with postpartum state.  Findings discussed in detail with patient.  A repeat lactic acid was obtained.  Patient received 2 L of normal saline.  Repeat lactic acid was 0.8.  Patient is feeling better blood pressures are still on  the low side but maintaining between 90s and 100.  Patient states she is feeling better.  She denies any major needs at this time.  Patient will be admitted to the hospital here or transferred for further evaluation and care patient will be signed out to the oncoming physician for further evaluation and care antibiotics of ceftriaxone and vancomycin will continue for the patient.  A bed did become available in the [facility] and I discussed case with Dr. Carlin with hospitalist service and he was in agreement with admitting the patient for further evaluation and care he requested to talk to OB about possible endometritis.  Patient has no pain with palpation of the suprapubic region.  Case was discussed in detail with Dr. Maldonado with OB/GYN and she said if patient was not having pain and significant bleeding there would be no need for further work-up of endometritis at this time.  Patient informed of admission.     I have reviewed the nursing notes.    I have reviewed the findings, diagnosis, plan and need for follow up with the patient.           Final diagnoses:   Pyelonephritis, acute - 6 day post partum   Sepsis, due to unspecified organism, unspecified whether acute organ dysfunction present (H)       4/12/2023   [facility] EMERGENCY DEPT     Mingo Pacheco MD  04/13/23 [id number]       Mingo Pacheco MD  04/15/23 0669

## 2023-04-13 LAB
ACINETOBACTER SPECIES: NOT DETECTED
ALBUMIN SERPL BCG-MCNC: 2.4 G/DL (ref 3.5–5.2)
ALP SERPL-CCNC: 139 U/L (ref 35–104)
ALT SERPL W P-5'-P-CCNC: 10 U/L (ref 10–35)
ANION GAP SERPL CALCULATED.3IONS-SCNC: 15 MMOL/L (ref 7–15)
AST SERPL W P-5'-P-CCNC: 20 U/L (ref 10–35)
BACTERIA UR CULT: NO GROWTH
BILIRUB SERPL-MCNC: 0.3 MG/DL
BUN SERPL-MCNC: 14.3 MG/DL (ref 6–20)
CALCIUM SERPL-MCNC: 8.5 MG/DL (ref 8.6–10)
CHLORIDE SERPL-SCNC: 110 MMOL/L (ref 98–107)
CITROBACTER SPECIES: NOT DETECTED
CREAT SERPL-MCNC: 1.11 MG/DL (ref 0.51–0.95)
CTX-M: NOT DETECTED
DEPRECATED HCO3 PLAS-SCNC: 17 MMOL/L (ref 22–29)
ENTEROBACTER SPECIES: NOT DETECTED
ERYTHROCYTE [DISTWIDTH] IN BLOOD BY AUTOMATED COUNT: 16.8 % (ref 10–15)
ESCHERICHIA COLI: DETECTED
GFR SERPL CREATININE-BSD FRML MDRD: 72 ML/MIN/1.73M2
GLUCOSE SERPL-MCNC: 89 MG/DL (ref 70–99)
HCT VFR BLD AUTO: 29 % (ref 35–47)
HGB BLD-MCNC: 9.1 G/DL (ref 11.7–15.7)
IMP: NOT DETECTED
KLEBSIELLA OXYTOCA: NOT DETECTED
KLEBSIELLA PNEUMONIAE: NOT DETECTED
KPC: NOT DETECTED
MCH RBC QN AUTO: 26 PG (ref 26.5–33)
MCHC RBC AUTO-ENTMCNC: 31.4 G/DL (ref 31.5–36.5)
MCV RBC AUTO: 83 FL (ref 78–100)
NDM: NOT DETECTED
OXA (DETECTED/NOT DETECTED): NOT DETECTED
PLATELET # BLD AUTO: 137 10E3/UL (ref 150–450)
POTASSIUM SERPL-SCNC: 3.8 MMOL/L (ref 3.4–5.3)
PROT SERPL-MCNC: 5.5 G/DL (ref 6.4–8.3)
PROTEUS SPECIES: NOT DETECTED
PSEUDOMONAS AERUGINOSA: NOT DETECTED
RBC # BLD AUTO: 3.5 10E6/UL (ref 3.8–5.2)
SODIUM SERPL-SCNC: 142 MMOL/L (ref 136–145)
VIM: NOT DETECTED
WBC # BLD AUTO: 8.7 10E3/UL (ref 4–11)

## 2023-04-13 PROCEDURE — 250N000013 HC RX MED GY IP 250 OP 250 PS 637: Performed by: FAMILY MEDICINE

## 2023-04-13 PROCEDURE — 80053 COMPREHEN METABOLIC PANEL: CPT

## 2023-04-13 PROCEDURE — 99232 SBSQ HOSP IP/OBS MODERATE 35: CPT | Performed by: FAMILY MEDICINE

## 2023-04-13 PROCEDURE — 250N000013 HC RX MED GY IP 250 OP 250 PS 637

## 2023-04-13 PROCEDURE — 36415 COLL VENOUS BLD VENIPUNCTURE: CPT

## 2023-04-13 PROCEDURE — 85014 HEMATOCRIT: CPT

## 2023-04-13 PROCEDURE — 258N000003 HC RX IP 258 OP 636

## 2023-04-13 PROCEDURE — 250N000011 HC RX IP 250 OP 636

## 2023-04-13 PROCEDURE — 120N000001 HC R&B MED SURG/OB

## 2023-04-13 PROCEDURE — 258N000003 HC RX IP 258 OP 636: Performed by: FAMILY MEDICINE

## 2023-04-13 RX ORDER — IBUPROFEN 400 MG/1
400 TABLET, FILM COATED ORAL
Status: COMPLETED | OUTPATIENT
Start: 2023-04-13 | End: 2023-04-13

## 2023-04-13 RX ADMIN — SODIUM CHLORIDE: 9 INJECTION, SOLUTION INTRAVENOUS at 21:33

## 2023-04-13 RX ADMIN — SODIUM CHLORIDE, POTASSIUM CHLORIDE, SODIUM LACTATE AND CALCIUM CHLORIDE 1000 ML: 600; 310; 30; 20 INJECTION, SOLUTION INTRAVENOUS at 09:37

## 2023-04-13 RX ADMIN — ACETAMINOPHEN 650 MG: 325 TABLET, FILM COATED ORAL at 16:08

## 2023-04-13 RX ADMIN — CEFTRIAXONE SODIUM 2 G: 2 INJECTION, POWDER, FOR SOLUTION INTRAMUSCULAR; INTRAVENOUS at 06:53

## 2023-04-13 RX ADMIN — SODIUM CHLORIDE: 9 INJECTION, SOLUTION INTRAVENOUS at 11:42

## 2023-04-13 RX ADMIN — IBUPROFEN 400 MG: 400 TABLET ORAL at 09:36

## 2023-04-13 RX ADMIN — LEVOTHYROXINE SODIUM 50 MCG: 50 TABLET ORAL at 08:13

## 2023-04-13 RX ADMIN — SENNOSIDES AND DOCUSATE SODIUM 1 TABLET: 50; 8.6 TABLET ORAL at 08:13

## 2023-04-13 RX ADMIN — ACETAMINOPHEN 650 MG: 325 TABLET, FILM COATED ORAL at 06:52

## 2023-04-13 RX ADMIN — SODIUM CHLORIDE: 9 INJECTION, SOLUTION INTRAVENOUS at 01:32

## 2023-04-13 ASSESSMENT — ACTIVITIES OF DAILY LIVING (ADL)
ADLS_ACUITY_SCORE: 18
ADLS_ACUITY_SCORE: 18
ADLS_ACUITY_SCORE: 22
ADLS_ACUITY_SCORE: 18
ADLS_ACUITY_SCORE: 22
ADLS_ACUITY_SCORE: 18
ADLS_ACUITY_SCORE: 22
ADLS_ACUITY_SCORE: 18
ADLS_ACUITY_SCORE: 18
ADLS_ACUITY_SCORE: 22
ADLS_ACUITY_SCORE: 18
ADLS_ACUITY_SCORE: 22

## 2023-04-13 NOTE — PROGRESS NOTES
Patient alert and oriented. Six days post partum. Breast feeding infant.  Independent in room.  On room air. Lung sounds clear, diminished  On IV antibiotics.  Complaining of of headache 8/10. Tylenol given & warm packs to lower head/neck area that helps.  Continues to experience chills off and on.  NS at 100 ml/hr  Regular diet. Boyfriend and infant in patient room overnight.

## 2023-04-13 NOTE — PLAN OF CARE
Goal Outcome Evaluation:      DATE & TIME: 4/13/2023 [id number]    Cognitive Concerns/ Orientation : alert and orientated x 4    BEHAVIOR & AGGRESSION TOOL COLOR: green   ABNL VS/O2: temp 102.3 with heart rate of 132 with fever.  ibuprofen given and fluid bolus given.  Effective and no further fever.    MOBILITY: independent  PAIN MANAGMENT: headache relieved by ibuprofen  DIET: as tolerated  BOWEL/BLADDER: no incontinence  ABNL LAB/BG: in epic  DRAIN/DEVICES: none, has breat pump at bedside  TELEMETRY RHYTHM: n/a  SKIN: intact  TESTS/PROCEDURES: none  D/C DATE: to be determined

## 2023-04-13 NOTE — PROVIDER NOTIFICATION
2208 Samantha K: temp 102.3, HR up to 132. can we have an order for ibuprofen? too soon for Tylenol. has 8/10 headache

## 2023-04-14 VITALS
HEART RATE: 83 BPM | DIASTOLIC BLOOD PRESSURE: 82 MMHG | OXYGEN SATURATION: 98 % | WEIGHT: 275.57 LBS | SYSTOLIC BLOOD PRESSURE: 132 MMHG | TEMPERATURE: 98.6 F | RESPIRATION RATE: 16 BRPM | BODY MASS INDEX: 43.16 KG/M2

## 2023-04-14 LAB
ANION GAP SERPL CALCULATED.3IONS-SCNC: 12 MMOL/L (ref 7–15)
BUN SERPL-MCNC: 12.1 MG/DL (ref 6–20)
CALCIUM SERPL-MCNC: 8.2 MG/DL (ref 8.6–10)
CHLORIDE SERPL-SCNC: 110 MMOL/L (ref 98–107)
CREAT SERPL-MCNC: 1.15 MG/DL (ref 0.51–0.95)
DEPRECATED HCO3 PLAS-SCNC: 17 MMOL/L (ref 22–29)
ERYTHROCYTE [DISTWIDTH] IN BLOOD BY AUTOMATED COUNT: 16.9 % (ref 10–15)
GFR SERPL CREATININE-BSD FRML MDRD: 69 ML/MIN/1.73M2
GLUCOSE SERPL-MCNC: 77 MG/DL (ref 70–99)
HCT VFR BLD AUTO: 27.6 % (ref 35–47)
HGB BLD-MCNC: 8.6 G/DL (ref 11.7–15.7)
MCH RBC QN AUTO: 25.7 PG (ref 26.5–33)
MCHC RBC AUTO-ENTMCNC: 31.2 G/DL (ref 31.5–36.5)
MCV RBC AUTO: 83 FL (ref 78–100)
PLATELET # BLD AUTO: 136 10E3/UL (ref 150–450)
POTASSIUM SERPL-SCNC: 3.4 MMOL/L (ref 3.4–5.3)
RBC # BLD AUTO: 3.34 10E6/UL (ref 3.8–5.2)
SODIUM SERPL-SCNC: 139 MMOL/L (ref 136–145)
WBC # BLD AUTO: 6.6 10E3/UL (ref 4–11)

## 2023-04-14 PROCEDURE — 82310 ASSAY OF CALCIUM: CPT | Performed by: FAMILY MEDICINE

## 2023-04-14 PROCEDURE — 36415 COLL VENOUS BLD VENIPUNCTURE: CPT | Performed by: FAMILY MEDICINE

## 2023-04-14 PROCEDURE — 258N000003 HC RX IP 258 OP 636: Performed by: FAMILY MEDICINE

## 2023-04-14 PROCEDURE — 85027 COMPLETE CBC AUTOMATED: CPT | Performed by: FAMILY MEDICINE

## 2023-04-14 PROCEDURE — 250N000011 HC RX IP 250 OP 636

## 2023-04-14 PROCEDURE — 250N000013 HC RX MED GY IP 250 OP 250 PS 637

## 2023-04-14 PROCEDURE — 99239 HOSP IP/OBS DSCHRG MGMT >30: CPT | Performed by: FAMILY MEDICINE

## 2023-04-14 RX ORDER — SODIUM CHLORIDE 450 MG/100ML
INJECTION, SOLUTION INTRAVENOUS CONTINUOUS
Status: DISCONTINUED | OUTPATIENT
Start: 2023-04-14 | End: 2023-04-14 | Stop reason: HOSPADM

## 2023-04-14 RX ORDER — CIPROFLOXACIN 500 MG/1
500 TABLET, FILM COATED ORAL 2 TIMES DAILY
Qty: 14 TABLET | Refills: 0 | Status: SHIPPED | OUTPATIENT
Start: 2023-04-14 | End: 2023-04-21

## 2023-04-14 RX ADMIN — FERROUS SULFATE TAB 325 MG (65 MG ELEMENTAL FE) 325 MG: 325 (65 FE) TAB at 00:46

## 2023-04-14 RX ADMIN — SODIUM CHLORIDE, POTASSIUM CHLORIDE, SODIUM LACTATE AND CALCIUM CHLORIDE 500 ML: 600; 310; 30; 20 INJECTION, SOLUTION INTRAVENOUS at 11:36

## 2023-04-14 RX ADMIN — ACETAMINOPHEN 650 MG: 325 TABLET, FILM COATED ORAL at 00:51

## 2023-04-14 RX ADMIN — ACETAMINOPHEN 650 MG: 325 TABLET, FILM COATED ORAL at 07:45

## 2023-04-14 RX ADMIN — LEVOTHYROXINE SODIUM 50 MCG: 50 TABLET ORAL at 07:45

## 2023-04-14 RX ADMIN — CEFTRIAXONE SODIUM 2 G: 2 INJECTION, POWDER, FOR SOLUTION INTRAMUSCULAR; INTRAVENOUS at 06:48

## 2023-04-14 ASSESSMENT — ACTIVITIES OF DAILY LIVING (ADL)
ADLS_ACUITY_SCORE: 18

## 2023-04-14 NOTE — PROGRESS NOTES
Pt finished her 500cc LR bolus. Per MD- hold on NS IVF. Will recheck vitals at 1400.  If patient is feeling well, no fever and heart rate has slowed- should be able to discharge this afternoon.  /80 (BP Location: Right arm)   Pulse 115   Temp 99.2  F (37.3  C) (Oral)   Resp 17   Wt 125 kg (275 lb 9.2 oz)   LMP 06/06/2022   SpO2 96%   BMI 43.16 kg/m    Sugey Martinez RN BSN

## 2023-04-14 NOTE — PROGRESS NOTES
Antimicrobial Stewardship Team Note    Antimicrobial Stewardship Program - A joint venture between [facility] and  Physicians to optimize antibiotic management.  NOT a formal consult - Restricted Antimicrobial Review     Patient: Samantha Cody  MRN: [id number]  Allergies: Patient has no known allergies.    Brief Summary: Samantha Cody is a 22-year-old female with a past medical history significant for recent spontaneous vaginal delivery on 4/6/2023, hypothyroidism and pregnancy-induced hypertension. Samantha presented to the emergency department on 4/12/23 with complaints of chills, body aches, lightheadedness, headache and weakness. Patient initially presented to the urgent care on 4/11/23 for dysuria and endorsed having a fever of 101oF. Reported a small tear near the urethra following delivery, generally improved since. UA was positive, significant for >182 WBC, large LE, moderate bacteria, and negative nitrite. Patient was prescribed a 7-day course of cefdinir.    History of Present Illness: On presentation to the ED, the patient was febrile (102.5oF), tachycardic (), BP (107/53) and with normal oxygen saturation. Labs were notable for Scr 1.21 (baseline 0.5-0.6) and lactic acid 2.1. WBC 8.2. Physical exam revealed no apparent distress or abdominal tenderness. Breath sounds were slightly decreased at bases and the patient was alert. Fever and tachycardia with findings of positive UA, patient was started on empiric vancomycin and ceftriaxone for secondary to urinary tract Infection. Blood cultures were collected prior to IV antibiotics.     CT abdomen and pelvis showed moderate perinephric and periureteral stranding on the right suggesting pyelonephritis, hepatic steatosis and mild splenomegaly. Repeat UA (after dose of each IV antibiotic) notable for 98 WBC, large LE, few bacteria and negative nitrite. Blood culture positive (in 1 out of 2 sets) with pan-susceptible E. Coli.  Initial urine culture also grew >100K E. coli (pan-susceptible) and vancomycin was discontinued on 4/12/23. Repeat urine culture negative.   On 4/13 the patient mostly remained afebrile with one spike of fever (Tmax of 102.3 oF) with tachycardia (). All other VS and WBC wnl. Lactic acid normalized to 0.8 and VALENTINO improving since admission. Patient overall feels better but continues to have headaches, improving, that is being managed by Tylenol.          Active Anti-infective Medications   (From admission, onward)                 Start     Stop    04/13/23 0700  cefTRIAXone  2 g,   Intravenous,   EVERY 24 HOURS        Pyelonephritis        --                  Assessment: E.coli Bacteremia 2/2 right kidney pyelonephritis   Today, the patient remains afebrile (99.2oF) and normotensive with improved tachycardia and WBC wnl. The patient has been on ceftriaxone since 4/12/23 with evidence of overall improvement given down trend in fever and WBC. Given the susceptibility results of E coli in blood and urine culture, it is reasonable to de-escalate to oral therapy with high oral bioavailability such as fluoroquinolones or Bactrim in the setting of bacteremia. We recommend transitioning ceftriaxone to oral fluoroquinolone. Drugs and Lactation Database  (LactMed ) suggest the use of ciprofloxacin is acceptable in nursing mothers with monitoring of the infant for possible effects on the gastrointestinal timi, such as diarrhea or candidiasis (thrush and diaper rash)1. The calcium in the breast milk might decrease the overall absorption of the small amounts of ciprofloxacin in the milk. Avoiding breastfeeding for 3 to 4 hours after a dose should decrease the exposure of the infant to ciprofloxacin in breastmilk. Ciprofloxacin has good oral bioavailability and tissue penetration, predicted to be effective for the treatment of bacteremia and pyelonephritis. The patient is  low comorbidity burden, presents with uncomplicated  bacteremia from a urinary source with no evidence of kidney structural or functional deformities. Therefore, 7 days of therapy is reasonable as supported by recent literature supporting short duration of 7 days to have similar outcomes as 14 days, in terms of mortality, relapse, length of hospital stay, complications of infection, resistance emergence, and adverse events2.      Recommendations:  1. Transition ceftriaxone 2 g IV q24h to ciprofloxacin 500 mg PO twice daily for a total of 7 days (end date 4/19/23).  2. Advise the patient to take ciprofloxacin 2 hours before or 6 hours after taking PTA Ferrous sulfate (contains iron). Iron containing products can alter the absorption of ciprofloxacin.     References:   1. Drugs and Lactation Database (LactMed ) [Internet]. Marty (MD): National [place] [employer]; 2006-. Ciprofloxacin. [Updated 2021 Dec 20]. Available from: https://www.ncbi.nlm.nih.gov/books/OVA787941/  2. Gertrude A, raul Matthew E, Mk J, et al. Duration of antibiotic treatment for Gram-negative bacteremia - Systematic review and individual participant data (IPD) meta-analysis. EClinicalMedicine. 2022;55:987909.     Pharmacy took the following actions: Called/paged provider, Electronic note created.    Discussed with ID Staff: Adriane Johnson MD, MS and Anel Benoit, PharmD, ANA Becker, PharmD Candidate 2023.??     Vital Signs/Clinical Features:  Vitals       04/12 0700  04/13 0659 04/13 0700  04/14 0659 04/14 0700  04/14 1422   Most Recent      Temp ( F) 98 -  99.2    97.9 -  102.3    98.6 -  99.9     98.6 (37) 04/14 1420    Pulse 72 -  114    100 -  134    83 -  115     83 04/14 1420    Resp   18    16 -  18    16 -  18     16 04/14 1420    BP 87/45 -  125/77    112/49 -  152/87    132/82 -  142/90     132/82 04/14 1420    SpO2 (%) 91 -  100    94 -  99    96 -  100     98 04/14 1420          Labs  Estimated Creatinine Clearance: 105.4 mL/min (A) (based on SCr of  1.15 mg/dL (H)).  Recent Labs   Lab Test 01/16/23 1712 03/27/23  0803 04/05/23 2002 04/12/23  0603 04/13/23  0745 04/14/23  0611   CR 0.59 0.52 0.61 1.21* 1.11* 1.15*       Recent Labs   Lab Test 02/10/15  1000 06/09/22  0959 01/16/23  1712 03/27/23  0803 04/05/23 2002 04/07/23  0531 04/12/23  0603 04/13/23  0745 04/14/23  0611   WBC 5.6   < > 8.3 8.7 9.3  --  8.2 8.7 6.6   ANEU 3.5  --   --   --   --   --   --   --   --    ALYM 1.6  --   --   --   --   --   --   --   --    VINNY 0.4  --   --   --   --   --   --   --   --    AEOS 0.1  --   --   --   --   --   --   --   --    HGB 12.1   < > 10.8* 10.6* 10.9* 9.2* 9.6* 9.1* 8.6*   HCT 37.3   < > 33.2* 32.8* 33.4*  --  29.3* 29.0* 27.6*   MCV 81   < > 80 80 80  --  81 83 83      < > 199 180 178  --  118* 137* 136*    < > = values in this interval not displayed.       Recent Labs   Lab Test 04/13/15  0000 10/19/22  1209 01/16/23 1712 03/27/23  0803 04/05/23 2002 04/12/23  0603 04/13/23  0745   BILITOTAL 0.2  --  0.2 0.2 <0.2 1.0 0.3   ALKPHOS 157  --  105* 135* 151* 230* 139*   ALBUMIN 4.0  --  3.4* 3.3* 3.1* 2.7* 2.4*   AST 15 16 14 12 13 19 20   ALT 16 15 12 12 12 13 10       Recent Labs   Lab Test 02/10/15  1000 04/12/23 0619 04/12/23  0930   LACT  --  2.1* 0.8   CRP <2.9  --   --    SED 7  --   --              Culture Results:  7-Day Micro Results     Procedure Component Value Units Date/Time    Urine Culture [[id number]] Collected: 04/12/23 0912    Order Status: Completed Lab Status: Final result Updated: 04/13/23 1233    Specimen: Urine, Midstream      Culture No Growth    Blood Culture Line, venous [[id number]]  (Abnormal)  (Susceptibility) Collected: 04/12/23 0619    Order Status: Completed Lab Status: Preliminary result Updated: 04/14/23 0709    Specimen: Blood from Line, venous      Culture Positive on the 1st day of incubation      Escherichia coli     Comment: 1 of 1 bottles       Narrative:      Only an Aerobic Blood Culture Bottle was  collected, interpret results with caution.        Susceptibility     Escherichia coli (1)     Antibiotic Interpretation Sensitivity   Method Status    Ampicillin Susceptible <=2 ug/mL CARY Final    Ampicillin/ Sulbactam Susceptible <=2 ug/mL CARY Final    Piperacillin/Tazobactam Susceptible <=4 ug/mL CARY Final    Cefoxitin  [*]  Susceptible <=4 ug/mL CARY Final    Ceftazidime Susceptible <=1 ug/mL CARY Final    Ceftriaxone Susceptible <=1 ug/mL CARY Final    Cefepime Susceptible <=1 ug/mL CARY Final    Extended Spectrum Beta-Lactamase  [*]  ESBL Negative Negative ug/mL CARY Final    Meropenem Susceptible <=0.25 ug/mL CARY Final    Amikacin  [*]  Susceptible <=2 ug/mL CARY Final    Gentamicin Susceptible <=1 ug/mL CARY Final    Tobramycin Susceptible <=1 ug/mL CARY Final    Ciprofloxacin Susceptible <=0.25 ug/mL CARY Final    Levofloxacin Susceptible <=0.12 ug/mL CARY Final    Nitrofurantoin  [*]  Susceptible <=16 ug/mL CARY Final    Trimethoprim/Sulfamethoxazole Susceptible <=1/19 ug/mL CARY Final           [*]  Suppressed Antibiotic                 Blood Culture Peripheral Blood [[id number]]  (Normal) Collected: 04/12/23 0619    Order Status: Completed Lab Status: Preliminary result Updated: 04/14/23 0946    Specimen: Peripheral Blood      Culture No growth after 2 days    Narrative:      Only an Aerobic Blood Culture Bottle was collected, interpret results with caution.          Verigene GN Panel [[id number]]  (Abnormal) Collected: 04/12/23 0619    Order Status: Completed Lab Status: Final result Updated: 04/13/23 0132    Specimen: Blood from Line, venous      Acinetobacter species Not Detected     Citrobacter species Not Detected     Enterobacter species Not Detected     Proteus species Not Detected     Escherichia coli Detected     Comment: Positive for Escherichia coli by [employer] multiplex nucleic acid test. Final identification and antimicrobial susceptibility testing will be verified by standard methods. [employer] test  will not distinguish E. coli from Shigella species including Shigella dysenteriae, Shigella flexneri, Shigella boydii, and Shigella sonnei. Specimens containing Shigella species or E. coli will be reported as positive for E. coli.        Klebsiella pneumoniae Not Detected     Klebsiella oxytoca Not Detected     Pseudomonas aeruginosa Not Detected     CTX-M Not Detected     KPC Not Detected     NDM Not Detected     VIM Not Detected     IMP Not Detected     OXA Not Detected    Narrative:      Specimen tested with [employer] multiplex, gram-negative blood culture nucleic acid test for the following targets: Acinetobacter species, Citrobacter species, Enterobacter species, Proteus species, Escherichia coli, Klebsiella pneumoniae, Klebsiella oxytoca, Pseudomonas aeruginosa, and the following resistance markers: CTX-M, KPC, NDM, VIM, IMP and OXA.    Urine Culture [[id number]]  (Abnormal)  (Susceptibility) Collected: 04/11/23 1651    Order Status: Completed Lab Status: Final result Updated: 04/12/23 7492    Specimen: Urine, Straight Catheter      Culture >100,000 CFU/mL Escherichia coli    Susceptibility     Escherichia coli (1)     Antibiotic Interpretation Sensitivity   Method Status    Ampicillin Susceptible <=2 ug/mL CARY Final    Ampicillin/ Sulbactam Susceptible <=2 ug/mL CARY Final    Piperacillin/Tazobactam Susceptible <=4 ug/mL CARY Final    Cefazolin Susceptible <=4 ug/mL CARY Final     Cefazolin CARY breakpoints are for the treatment of uncomplicated urinary tract infections. For the treatment of systemic infections, please contact the laboratory for additional testing.       Cefoxitin Susceptible <=4 ug/mL CARY Final    Ceftazidime Susceptible <=1 ug/mL CARY Final    Ceftriaxone Susceptible <=1 ug/mL CARY Final    Cefepime Susceptible <=1 ug/mL CARY Final    Extended Spectrum Beta-Lactamase  [*]  ESBL Negative Negative ug/mL CARY Final    Meropenem  [*]  Susceptible <=0.25 ug/mL CARY Final    Amikacin  [*]  Susceptible  <=2 ug/mL CARY Final    Gentamicin Susceptible <=1 ug/mL CARY Final    Tobramycin Susceptible <=1 ug/mL CARY Final    Ciprofloxacin Susceptible <=0.25 ug/mL CARY Final    Levofloxacin Susceptible <=0.12 ug/mL CARY Final    Nitrofurantoin Susceptible <=16 ug/mL CARY Final    Trimethoprim/Sulfamethoxazole Susceptible <=1/19 ug/mL CARY Final           [*]  Suppressed Antibiotic                 Urine Culture [[id number]] Collected: 04/11/23 1544    Order Status: Canceled Lab Status: No result Updated: 04/11/23 1607    Specimen: Urine, Midstream           Recent Labs   Lab Test 08/25/22  1108 04/11/23  1544 04/11/23  1809 04/12/23  0912   URINEPH 6.0 6.0  6.0 6.0 6.0   NITRITE Negative Negative  Negative Negative Negative   LEUKEST Trace* Large*  Large* Large* Large*   WBCU 0-5 >182*  >182* >182* 93*                         Imaging: Abd/pelvis CT - no contrast - Stone Protocol    Result Date: 4/12/2023  CT ABDOMEN AND PELVIS WITHOUT CONTRAST 4/12/2023 8:46 AM CLINICAL HISTORY: Fever. Urinary tract infection. TECHNIQUE: CT scan of the abdomen and pelvis was performed without IV contrast. Multiplanar reformats were obtained. Dose reduction techniques were used. CONTRAST: None. COMPARISON: None. FINDINGS: LOWER CHEST: The visualized lung bases are clear. Small hiatal hernia. HEPATOBILIARY: Hepatic steatosis. No calcified gallstones. PANCREAS: Normal. SPLEEN: Mild splenomegaly. The spleen measures 15.9 cm in length. ADRENAL GLANDS: Normal. KIDNEYS/BLADDER: There is moderate perinephric and periureteral stranding on the right, suggesting pyelonephritis. No urinary calculi. BOWEL: No bowel obstruction. No convincing evidence for colitis or diverticulitis. Unremarkable appendix. PELVIC ORGANS: Enlargement of the uterus is consistent with the postpartum state. LYMPH NODES: No enlarged lymph nodes are identified in the abdomen or pelvis. A few mildly prominent retroperitoneal lymph nodes are most likely reactive. VASCULATURE:  Unremarkable. ADDITIONAL FINDINGS: None. MUSCULOSKELETAL: Unremarkable.     IMPRESSION: 1.  Moderate perinephric and periureteral stranding on the right suggest pyelonephritis. A recently passed stone could also possibly have this appearance. 2.  Enlargement of the uterus is consistent with the postpartum state. 3.  Hepatic steatosis. 4.  Mild splenomegaly. ROJELIO RENTERIA MD   SYSTEM ID:  [id number]

## 2023-04-14 NOTE — DISCHARGE SUMMARY
[facility] Discharge Summary    Samantha Cody MRN# [id number]   Age: 22 year old YOB: 2000     Date of Admission:  4/12/2023  Date of Discharge::  4/14/2023  Admitting Physician:  Mingo Pacheco MD  Discharge Physician:  Tai Ocampo MD, MD             Admission Diagnoses:   Pyelonephritis, acute [N10]  Sepsis, due to unspecified organism, unspecified whether acute organ dysfunction present (H) [A41.9]          Principle Discharge Diagnosis:       Sepsis; likely secondary to pyelonephritis due to Ecoli UTI with E coli bacteremia (bloodstream infection) due to this     See hospital course for further active diagnoses addressed during this admission.              Medications Prior to Admission:     Medications Prior to Admission   Medication Sig Dispense Refill Last Dose     acetaminophen (TYLENOL) 325 MG tablet Take 2 tablets (650 mg) by mouth every 6 hours as needed for mild pain Start after Delivery. 100 tablet 0 Past Month     cyanocobalamin (VITAMIN B-12) 1000 MCG tablet Take 1 tablet (1,000 mcg) by mouth daily 90 tablet 1 Past Week     ferrous sulfate (FEROSUL) 325 (65 Fe) MG tablet Take 1 tablet (325 mg) by mouth daily (with breakfast) 30 tablet 3 Past Week     ibuprofen (ADVIL/MOTRIN) 600 MG tablet Take 1 tablet (600 mg) by mouth every 6 hours as needed for moderate pain Start after delivery 60 tablet 0 4/12/2023 at 0430     levothyroxine (SYNTHROID/LEVOTHROID) 75 MCG tablet Take 75 mcg by mouth daily   4/11/2023     vitamin C (ASCORBIC ACID) 500 MG tablet Take 1 tablet (500 mg) by mouth daily 60 tablet 3 Past Week     levothyroxine (SYNTHROID/LEVOTHROID) 25 MCG tablet Take 2 tablets (50 mcg) by mouth daily 50 tablet 3      senna-docusate (SENOKOT-S/PERICOLACE) 8.6-50 MG tablet Take 1 tablet by mouth daily Start after delivery. 100 tablet 0      [DISCONTINUED] cefdinir (OMNICEF) 300 MG capsule Take 1 capsule (300 mg) by mouth 2 times daily for 7 days 14 capsule 0 4/11/2023  at 2000             Discharge Medications:     Current Discharge Medication List      START taking these medications    Details   ciprofloxacin (CIPRO) 500 MG tablet Take 1 tablet (500 mg) by mouth 2 times daily for 7 days Be sure to take your ciprofloxacin 2 hours before or 6 hours after taking your ferrous sulfate  Qty: 14 tablet, Refills: 0    Associated Diagnoses: Pyelonephritis, acute         CONTINUE these medications which have NOT CHANGED    Details   acetaminophen (TYLENOL) 325 MG tablet Take 2 tablets (650 mg) by mouth every 6 hours as needed for mild pain Start after Delivery.  Qty: 100 tablet, Refills: 0    Associated Diagnoses:  (spontaneous vaginal delivery)      cyanocobalamin (VITAMIN B-12) 1000 MCG tablet Take 1 tablet (1,000 mcg) by mouth daily  Qty: 90 tablet, Refills: 1    Associated Diagnoses: Anemia during pregnancy      ferrous sulfate (FEROSUL) 325 (65 Fe) MG tablet Take 1 tablet (325 mg) by mouth daily (with breakfast)  Qty: 30 tablet, Refills: 3    Associated Diagnoses: Anemia during pregnancy in third trimester      ibuprofen (ADVIL/MOTRIN) 600 MG tablet Take 1 tablet (600 mg) by mouth every 6 hours as needed for moderate pain Start after delivery  Qty: 60 tablet, Refills: 0    Associated Diagnoses:  (spontaneous vaginal delivery)      !! levothyroxine (SYNTHROID/LEVOTHROID) 75 MCG tablet Take 75 mcg by mouth daily      vitamin C (ASCORBIC ACID) 500 MG tablet Take 1 tablet (500 mg) by mouth daily  Qty: 60 tablet, Refills: 3    Associated Diagnoses: Anemia during pregnancy in third trimester      !! levothyroxine (SYNTHROID/LEVOTHROID) 25 MCG tablet Take 2 tablets (50 mcg) by mouth daily  Qty: 50 tablet, Refills: 3    Associated Diagnoses: Acquired hypothyroidism      senna-docusate (SENOKOT-S/PERICOLACE) 8.6-50 MG tablet Take 1 tablet by mouth daily Start after delivery.  Qty: 100 tablet, Refills: 0    Associated Diagnoses:  (spontaneous vaginal delivery)       !! - Potential  duplicate medications found. Please discuss with provider.      STOP taking these medications       cefdinir (OMNICEF) 300 MG capsule Comments:   Reason for Stopping:                          Brief History of Illness:     From Admission H+P:   Samantha Cody is a 22 year old  female with past medical history of hypothyroidism, morbid obesity, tobacco use disorder via vaping, and cHTN during pregnancy, status postpartum 6 days (23) now presents on 2023 with sepsis likely secondary to pyelonephritis of right kidney. She states having a normal vaginal delivery. Review of charts notes that her postpartum course was complicated by acute blood loss anemia. She states having ongoing worsening chills since the day following her delivery and notes that she has been febrile at home, particularly after a warm bath. Yesterday she was seen in the urgent care for complaints of dysuria and was started on antibiotic cefdinir for UTI. This AM the patient states one episode of emesis with ongoing chills and fever. Upon arrival at the ED she was tachycardic and febrile. UA performed in the ED shows bacterial infection and CT showed perinephric and periureteral stranding on the right kidney that is suggestive of  Pyelonephritis. She was started on IV ceftriaxone, vancomycin, an fluids in the ED.               TODAY:     Subjective:  Doing well today.  No fever or chills. Feels good this afternoon and ready for discharge home.  Taking orals well.  No flank or abdominal pain.  headache improved.    No other pain.     ROS:  . ROS: 10 point ROS neg other than the symptoms noted above in the HPI.   /80 (BP Location: Right arm)   Pulse 115   Temp 99.2  F (37.3  C) (Oral)   Resp 17   Wt 125 kg (275 lb 9.2 oz)   LMP 2022   SpO2 96%   BMI 43.16 kg/m     EXAM:  General: awake and alert, NAD, oriented x 3  Head: normocephalic  Neck: unremarkable, no lymphadenopathy   HEENT: oropharynx pink and moist     Heart: Regular rate and rhythm, no murmurs, rubs, or gallops  Lungs: clear to auscultation bilaterally with good air movement throughout  Abdomen: soft, non-tender, no masses or organomegaly  No CVA tenderness   Extremities: no edema in lower extremities   Skin unremarkable.       Hospital Course:          Samantha Cody is a 22 year old female that presented on 4/12/2023 with sepsis likely secondary to pyelonephritis of right kidney.      Sepsis; likely secondary to pyelonephritis due to Ecoli UTI with E coli bacteremia (bloodstream infection) due to this   - Patient presented to ED tachycardic and febrile with a urinary source of infection. UA shows UTI with culture growing E.coli. Continue ceftriaxone IV and discontinue Vancomycin. Will consult with pharmacy for safe oral medication during lactation after discharge. Lactic acid normalized from 2.1 on admission.    - fever and tachycardia AM 4/13 - gave 1L LR and single dose of prn ibuprofen 400.  Still having rigors and some intermittent tachycardia with borderline temps 4/14 - gave additional 500cc LR, then continued IVF @ 100/h but switched to 0.45 NS.    - blood culture and urine culture growing pan-sensitive EColi.    -  Per AMS recommendation will discharge on ciprofloxacin      Acute Kidney Injury  - Increased creatinine 1.21, baseline 0.60 and last checked 7 days ago.  - on NS @ 100/h on admission.  Creatinine improving but heart rate up AM 4/13- gave additional 1L LR bolus, then continue 100/h NS.  - unchanged 4/14 - recommend recheck of BMP at follow-up with primary care provider       Mild Anemia  - Recent delivery complicated by acute blood loss likely source of anemia.  - fairly stable so far - recommend recheck of CBC at follow-up with primary care provider in 1 week    - continue iron supplement, but space it out from the cipro      Hypothyroidism  - Continue on levothyroxine 50mg     1 week post-partum  - patient is breast  "feeding             # Hypoalbuminemia: Lowest albumin = 2.7 g/dL at 4/12/2023  6:03 AM, will monitor as appropriate   # Thrombocytopenia: Lowest platelets = 118 in last 2 days, will monitor for bleeding  # Acute Kidney Injury, unspecified: based on a >150% or 0.3 mg/dL increase in last creatinine compared to past 90 day average, will monitor renal function       # Severe Obesity: Estimated body mass index is 43.16 kg/m  as calculated from the following:    Height as of 3/22/23: 1.702 m (5' 7\").    Weight as of this encounter: 125 kg (275 lb 9.2 oz).               DVT Prophylaxis: Low Risk/Ambulatory with no VTE prophylaxis indicated - reassess if unable to discharge 4/14     Cannon Catheter: Not present     Code Status:   Lines: None          Discharge Instructions and Follow-Up:      Discharge diet: Orders Placed This Encounter      Advance Diet as Tolerated: Regular Diet Adult       Discharge activity: Activity as tolerated   Discharge follow-up: Follow up with primary care provider in 7 days, recheck BMP and hemoglobin at that time.            Discharge Disposition:     Discharged to [facility]      Attestation:  Amount of time performed on this discharge summary: 45 minutes.    Tai Ocampo MD, MD       "

## 2023-04-14 NOTE — PLAN OF CARE
Time: Assumed care of patient from [id number]    Reason for Admission: Sepsis, UTI    Activity: Up ad abi    Neuro: Alert and orientated    GI/: Urinary frequency/urgency, BS active    Skin: Intact    Diet: Regular    IV Access: PIV R FA infusing NS at 100mL/h, R hand PIV SL    Vitals: Chills, Hypertension, tachycardia at start of shift, now VSS on RA    Pain: Headache, given PRN tylenol    Plan: SO and baby spending the night. Pt is breastfeeding. Breast pump in room. IV ceftriaxone q24h. Likely discharge home tomorrow 4/14 on PO antibiotics.    Lenore Mejia RN

## 2023-04-14 NOTE — PROGRESS NOTES
[facility] DISCHARGE NOTE    Patient discharged to home at 3:54 PM via ambulation. Accompanied by significant other and staff. Discharge instructions reviewed with patient, opportunity offered to ask questions. All belongings sent with patient.    Harsh Valentino RN

## 2023-04-14 NOTE — NURSING NOTE
"Initial BP (!) 134/90 (BP Location: Right arm, Patient Position: Chair, Cuff Size: Adult Large)   Pulse 85   Temp 98.9  F (37.2  C) (Tympanic)   Resp 18   Ht 1.702 m (5' 7\")   Wt 134.3 kg (296 lb)   LMP 06/06/2022   BMI 46.36 kg/m   Estimated body mass index is 46.36 kg/m  as calculated from the following:    Height as of this encounter: 1.702 m (5' 7\").    Weight as of this encounter: 134.3 kg (296 lb). .      " show

## 2023-04-14 NOTE — PLAN OF CARE
Paperwork completed and IVs removed- pt is ready for discharge when all packed up  Sugey Martinez RN BSN

## 2023-04-15 ENCOUNTER — PATIENT OUTREACH (OUTPATIENT)
Dept: CARE COORDINATION | Facility: CLINIC | Age: 23
End: 2023-04-15
Payer: COMMERCIAL

## 2023-04-15 NOTE — PROGRESS NOTES
Clinic Care Coordination Contact  [facility]: Post-Discharge Note  SITUATION                                                      Admission:    Admission Date: 23   Reason for Admission: Pyelonephritis, acute (N10)  Sepsis, due to unspecified organism, unspecified whether acute organ dysfunction present (H) (A41.9)  Discharge:   Discharge Date: 23  Discharge Diagnosis: Sepsis; likely secondary to pyelonephritis due to Ecoli UTI with E coli bacteremia (bloodstream infection) due to this    BACKGROUND                                                      Per hospital discharge summary and inpatient provider notes:    Samantha Cody is a 22 year old  female with past medical history of hypothyroidism, morbid obesity, tobacco use disorder via vaping, and cHTN during pregnancy, status postpartum 6 days (23) now presents on 2023 with sepsis likely secondary to pyelonephritis of right kidney    ASSESSMENT           Discharge Assessment  How are you doing now that you are home?: I'm doing pretty good  How are your symptoms? (Red Flag symptoms escalate to triage hotline per guidelines): Improved  Do you feel your condition is stable enough to be safe at home until your provider visit?: Yes  Does the patient have their discharge instructions? : Yes  Does the patient have questions regarding their discharge instructions? : No  Were you started on any new medications or were there changes to any of your previous medications? : Yes  Does the patient have all of their medications?: Yes (just received Cipro from pharmacy this morning)  Do you have questions regarding any of your medications? : No  Discharge follow-up appointment scheduled within 14 calendar days? : Yes  Discharge Follow Up Appointment Date: 23  Discharge Follow Up Appointment Scheduled with?: Primary Care Provider         Post-op (Clinicians Only)  Did the patient have surgery or a procedure: No    Patient doing well.  Has headache and mild back pain which is improved with ibuprofen. [place] sleeping now and has been feeding well.  [facility] nurse triage phone number provided to patient.       PLAN                                                      Outpatient Plan:    Discharge activity: Activity as tolerated   Discharge follow-up: Follow up with primary care provider in 7 days, recheck BMP and hemoglobin at that time.           Future Appointments   Date Time Provider Department Center   2023 10:30 AM [facility]         For any urgent concerns, please contact our 24 hour nurse triage line: [phone] ([phone])         Delphine Chaney RN

## 2023-04-17 LAB
BACTERIA BLD CULT: ABNORMAL
BACTERIA BLD CULT: ABNORMAL
BACTERIA BLD CULT: NO GROWTH

## 2023-04-28 ENCOUNTER — OFFICE VISIT (OUTPATIENT)
Dept: FAMILY MEDICINE | Facility: CLINIC | Age: 23
End: 2023-04-28
Payer: COMMERCIAL

## 2023-04-28 VITALS
HEIGHT: 67 IN | DIASTOLIC BLOOD PRESSURE: 78 MMHG | OXYGEN SATURATION: 96 % | TEMPERATURE: 97.8 F | SYSTOLIC BLOOD PRESSURE: 110 MMHG | WEIGHT: 261 LBS | RESPIRATION RATE: 20 BRPM | HEART RATE: 77 BPM | BODY MASS INDEX: 40.97 KG/M2

## 2023-04-28 DIAGNOSIS — N10 PYELONEPHRITIS, ACUTE: ICD-10-CM

## 2023-04-28 DIAGNOSIS — Z09 HOSPITAL DISCHARGE FOLLOW-UP: Primary | ICD-10-CM

## 2023-04-28 DIAGNOSIS — D62 ANEMIA DUE TO ACUTE BLOOD LOSS: ICD-10-CM

## 2023-04-28 LAB
ANION GAP SERPL CALCULATED.3IONS-SCNC: 8 MMOL/L (ref 7–15)
BASOPHILS # BLD AUTO: 0 10E3/UL (ref 0–0.2)
BASOPHILS NFR BLD AUTO: 1 %
BUN SERPL-MCNC: 13.8 MG/DL (ref 6–20)
CALCIUM SERPL-MCNC: 9.5 MG/DL (ref 8.6–10)
CHLORIDE SERPL-SCNC: 104 MMOL/L (ref 98–107)
CREAT SERPL-MCNC: 0.88 MG/DL (ref 0.51–0.95)
DEPRECATED HCO3 PLAS-SCNC: 30 MMOL/L (ref 22–29)
EOSINOPHIL # BLD AUTO: 0.1 10E3/UL (ref 0–0.7)
EOSINOPHIL NFR BLD AUTO: 1 %
ERYTHROCYTE [DISTWIDTH] IN BLOOD BY AUTOMATED COUNT: 15.9 % (ref 10–15)
GFR SERPL CREATININE-BSD FRML MDRD: >90 ML/MIN/1.73M2
GLUCOSE SERPL-MCNC: 84 MG/DL (ref 70–99)
HCT VFR BLD AUTO: 37.3 % (ref 35–47)
HGB BLD-MCNC: 11.7 G/DL (ref 11.7–15.7)
IMM GRANULOCYTES # BLD: 0 10E3/UL
IMM GRANULOCYTES NFR BLD: 0 %
LYMPHOCYTES # BLD AUTO: 1.5 10E3/UL (ref 0.8–5.3)
LYMPHOCYTES NFR BLD AUTO: 23 %
MCH RBC QN AUTO: 25.6 PG (ref 26.5–33)
MCHC RBC AUTO-ENTMCNC: 31.4 G/DL (ref 31.5–36.5)
MCV RBC AUTO: 82 FL (ref 78–100)
MONOCYTES # BLD AUTO: 0.4 10E3/UL (ref 0–1.3)
MONOCYTES NFR BLD AUTO: 6 %
NEUTROPHILS # BLD AUTO: 4.4 10E3/UL (ref 1.6–8.3)
NEUTROPHILS NFR BLD AUTO: 69 %
PLATELET # BLD AUTO: 368 10E3/UL (ref 150–450)
POTASSIUM SERPL-SCNC: 4.4 MMOL/L (ref 3.4–5.3)
RBC # BLD AUTO: 4.57 10E6/UL (ref 3.8–5.2)
SODIUM SERPL-SCNC: 142 MMOL/L (ref 136–145)
WBC # BLD AUTO: 6.4 10E3/UL (ref 4–11)

## 2023-04-28 PROCEDURE — 80048 BASIC METABOLIC PNL TOTAL CA: CPT | Performed by: NURSE PRACTITIONER

## 2023-04-28 PROCEDURE — 85025 COMPLETE CBC W/AUTO DIFF WBC: CPT | Performed by: NURSE PRACTITIONER

## 2023-04-28 PROCEDURE — 99495 TRANSJ CARE MGMT MOD F2F 14D: CPT | Performed by: NURSE PRACTITIONER

## 2023-04-28 PROCEDURE — 36415 COLL VENOUS BLD VENIPUNCTURE: CPT | Performed by: NURSE PRACTITIONER

## 2023-04-28 ASSESSMENT — PAIN SCALES - GENERAL: PAINLEVEL: NO PAIN (0)

## 2023-04-28 NOTE — PROGRESS NOTES
Assessment & Plan     Hospital discharge follow-up  Sepsis due to Ecoli much improved     Anemia due to acute blood loss  Labs today to monitor  Continue iron with vitamin C   - CBC with platelets and differential    Pyelonephritis, acute  Resolving.   Labs today to monitor renal function.  - Basic metabolic panel  (Ca, Cl, CO2, Creat, Gluc, K, Na, BUN)      Make 6 wk post partum appt for follow up.  Call or return to the clinic with any worsening of symptoms or no resolution. Patient/Parent verbalized understanding and is in agreement. Medication side effects reviewed.   Current Outpatient Medications   Medication Sig Dispense Refill     levothyroxine (SYNTHROID/LEVOTHROID) 25 MCG tablet Take 2 tablets (50 mcg) by mouth daily 50 tablet 3     acetaminophen (TYLENOL) 325 MG tablet Take 2 tablets (650 mg) by mouth every 6 hours as needed for mild pain Start after Delivery. (Patient not taking: Reported on 4/28/2023) 100 tablet 0     cyanocobalamin (VITAMIN B-12) 1000 MCG tablet Take 1 tablet (1,000 mcg) by mouth daily (Patient not taking: Reported on 4/28/2023) 90 tablet 1     ferrous sulfate (FEROSUL) 325 (65 Fe) MG tablet Take 1 tablet (325 mg) by mouth daily (with breakfast) (Patient not taking: Reported on 4/28/2023) 30 tablet 3     ibuprofen (ADVIL/MOTRIN) 600 MG tablet Take 1 tablet (600 mg) by mouth every 6 hours as needed for moderate pain Start after delivery (Patient not taking: Reported on 4/28/2023) 60 tablet 0     levothyroxine (SYNTHROID/LEVOTHROID) 75 MCG tablet Take 75 mcg by mouth daily (Patient not taking: Reported on 4/28/2023)       senna-docusate (SENOKOT-S/PERICOLACE) 8.6-50 MG tablet Take 1 tablet by mouth daily Start after delivery. (Patient not taking: Reported on 4/28/2023) 100 tablet 0     vitamin C (ASCORBIC ACID) 500 MG tablet Take 1 tablet (500 mg) by mouth daily (Patient not taking: Reported on 4/28/2023) 60 tablet 3     Chart documentation with Dragon Voice recognition Software.  Although reviewed after completion, some words and grammatical errors may remain.      RAFY Blackmon CNP  [facility]    Gemma Singh is a 22 year old, presenting for the following health issues:  Hospital F/U        4/28/2023    10:28 AM   Additional Questions   Roomed by Stella HOWELL CMA     HPI         4/15/2023    12:52 PM   Post Discharge Outreach   Admission Date 4/12/2023   Reason for Admission Pyelonephritis, acute (N10)  Sepsis, due to unspecified organism, unspecified whether acute organ dysfunction present (H) (A41.9)   Discharge Date 4/14/2023   Discharge Diagnosis Sepsis; likely secondary to pyelonephritis due to Ecoli UTI with E coli bacteremia (bloodstream infection) due to this   How are you doing now that you are home? I'm doing pretty good   How are your symptoms? (Red Flag symptoms escalate to triage hotline per guidelines) Improved   Do you feel your condition is stable enough to be safe at home until your provider visit? Yes   Does the patient have their discharge instructions?  Yes   Does the patient have questions regarding their discharge instructions?  No   Were you started on any new medications or were there changes to any of your previous medications?  Yes   Does the patient have all of their medications? Yes   Do you have questions regarding any of your medications?  No   Discharge follow-up appointment scheduled within 14 calendar days?  Yes   Discharge Follow Up Appointment Date 4/28/2023   Discharge Follow Up Appointment Scheduled with? Primary Care Provider     Hospital Follow-up Visit:    Hospital/Nursing Home/IP Rehab Facility: [facility]  Date of Admission: 04/12/2023  Date of Discharge: 04/14/2023  Reason(s) for Admission: Sepsis, due to unspecified organism, unspecified whether acute organ dysfunction present.  Pyelonephritis, acute.     Was your hospitalization related to COVID-19? No   Problems taking  "medications regularly:  None  Medication changes since discharge: None  Problems adhering to non-medication therapy:  None    Summary of hospitalization:  [facility] discharge summary reviewed  Diagnostic Tests/Treatments reviewed.  Follow up needed: lab and post partum check   Other Healthcare Providers Involved in Patient s Care:         Specialist appointment - OB/GYN   Update since discharge: improved.   Plan of care communicated with patient           Review of Systems   Constitutional, HEENT, cardiovascular, pulmonary, GI, , musculoskeletal, neuro, skin, endocrine and psych systems are negative, except as otherwise noted.      Objective    /78 (BP Location: Right arm, Patient Position: Sitting, Cuff Size: Adult Large)   Pulse 77   Temp 97.8  F (36.6  C) (Tympanic)   Resp 20   Ht 1.702 m (5' 7\")   Wt 118.4 kg (261 lb)   LMP 06/06/2022   SpO2 96%   Breastfeeding No   BMI 40.88 kg/m    Body mass index is 40.88 kg/m .  Physical Exam   GENERAL: healthy, alert and no distress  EYES: Eyes grossly normal to inspection, PERRL and conjunctivae and sclerae normal  HENT: ear canals and TM's normal, nose and mouth without ulcers or lesions  NECK: no adenopathy, no asymmetry, masses, or scars and thyroid normal to palpation  RESP: lungs clear to auscultation - no rales, rhonchi or wheezes  CV: regular rate and rhythm, normal S1 S2, no S3 or S4, no murmur, click or rub, no peripheral edema and peripheral pulses strong  MS: no gross musculoskeletal defects noted, no edema  SKIN: no suspicious lesions or rashes  NEURO: Normal strength and tone, mentation intact and speech normal  PSYCH: mentation appears normal, affect normal/bright    Admission on 04/12/2023, Discharged on 04/14/2023   Component Date Value Ref Range Status     Sodium 04/12/2023 136  136 - 145 mmol/L Final     Potassium 04/12/2023 4.0  3.4 - 5.3 mmol/L Final     Chloride 04/12/2023 103  98 - 107 mmol/L Final     Carbon Dioxide " (CO2) 04/12/2023 17 (L)  22 - 29 mmol/L Final     Anion Gap 04/12/2023 16 (H)  7 - 15 mmol/L Final     Urea Nitrogen 04/12/2023 16.4  6.0 - 20.0 mg/dL Final     Creatinine 04/12/2023 1.21 (H)  0.51 - 0.95 mg/dL Final     Calcium 04/12/2023 8.3 (L)  8.6 - 10.0 mg/dL Final     Glucose 04/12/2023 109 (H)  70 - 99 mg/dL Final     Alkaline Phosphatase 04/12/2023 230 (H)  35 - 104 U/L Final     AST 04/12/2023 19  10 - 35 U/L Final     ALT 04/12/2023 13  10 - 35 U/L Final     Protein Total 04/12/2023 5.7 (L)  6.4 - 8.3 g/dL Final     Albumin 04/12/2023 2.7 (L)  3.5 - 5.2 g/dL Final     Bilirubin Total 04/12/2023 1.0  <=1.2 mg/dL Final     GFR Estimate 04/12/2023 65  >60 mL/min/1.73m2 Final    eGFR calculated using 2021 CKD-EPI equation.     WBC Count 04/12/2023 8.2  4.0 - 11.0 10e3/uL Final     RBC Count 04/12/2023 3.61 (L)  3.80 - 5.20 10e6/uL Final     Hemoglobin 04/12/2023 9.6 (L)  11.7 - 15.7 g/dL Final     Hematocrit 04/12/2023 29.3 (L)  35.0 - 47.0 % Final     MCV 04/12/2023 81  78 - 100 fL Final     MCH 04/12/2023 26.6  26.5 - 33.0 pg Final     MCHC 04/12/2023 32.8  31.5 - 36.5 g/dL Final     RDW 04/12/2023 16.6 (H)  10.0 - 15.0 % Final     Platelet Count 04/12/2023 118 (L)  150 - 450 10e3/uL Final     % Neutrophils 04/12/2023 95  % Final     % Lymphocytes 04/12/2023 2  % Final     % Monocytes 04/12/2023 1  % Final     % Eosinophils 04/12/2023 0  % Final     % Basophils 04/12/2023 0  % Final     % Immature Granulocytes 04/12/2023 2  % Final     NRBCs per 100 WBC 04/12/2023 0  <1 /100 Final     Absolute Neutrophils 04/12/2023 7.8  1.6 - 8.3 10e3/uL Final     Absolute Lymphocytes 04/12/2023 0.2 (L)  0.8 - 5.3 10e3/uL Final     Absolute Monocytes 04/12/2023 0.1  0.0 - 1.3 10e3/uL Final     Absolute Eosinophils 04/12/2023 0.0  0.0 - 0.7 10e3/uL Final     Absolute Basophils 04/12/2023 0.0  0.0 - 0.2 10e3/uL Final     Absolute Immature Granulocytes 04/12/2023 0.2  <=0.4 10e3/uL Final     Absolute NRBCs 04/12/2023 0.0   10e3/uL Final     Color Urine 04/12/2023 Yellow  Colorless, Straw, Light Yellow, Yellow Final     Appearance Urine 04/12/2023 Slightly Cloudy (A)  Clear Final     Glucose Urine 04/12/2023 Negative  Negative mg/dL Final     Bilirubin Urine 04/12/2023 Negative  Negative Final     Ketones Urine 04/12/2023 Negative  Negative mg/dL Final     Specific Gravity Urine 04/12/2023 1.009  1.003 - 1.035 Final     Blood Urine 04/12/2023 Large (A)  Negative Final     pH Urine 04/12/2023 6.0  5.0 - 7.0 Final     Protein Albumin Urine 04/12/2023 100 (A)  Negative mg/dL Final     Urobilinogen Urine 04/12/2023 4.0 (A)  Normal, 2.0 mg/dL Final     Nitrite Urine 04/12/2023 Negative  Negative Final     Leukocyte Esterase Urine 04/12/2023 Large (A)  Negative Final     Bacteria Urine 04/12/2023 Few (A)  None Seen /HPF Final     Mucus Urine 04/12/2023 Present (A)  None Seen /LPF Final     RBC Urine 04/12/2023 13 (H)  <=2 /HPF Final     WBC Urine 04/12/2023 93 (H)  <=5 /HPF Final     Squamous Epithelials Urine 04/12/2023 2 (H)  <=1 /HPF Final     Transitional Epithelials Urine 04/12/2023 3 (H)  <=1 /HPF Final     Lactic Acid 04/12/2023 2.1 (H)  0.7 - 2.0 mmol/L Final     Culture 04/12/2023 Positive on the 1st day of incubation (A)   Final     Culture 04/12/2023 Escherichia coli (AA)   Final    1 of 1 bottles     Culture 04/12/2023 No Growth   Final     Lactic Acid 04/12/2023 0.8  0.7 - 2.0 mmol/L Final     Culture 04/12/2023 No Growth   Final     Acinetobacter species 04/12/2023 Not Detected  Not Detected Final     Citrobacter species 04/12/2023 Not Detected  Not Detected Final     Enterobacter species 04/12/2023 Not Detected  Not Detected Final     Proteus species 04/12/2023 Not Detected  Not Detected Final     Escherichia coli 04/12/2023 Detected (A)  Not Detected Final    Positive for Escherichia coli by Kimeltu multiplex nucleic acid test. Final identification and antimicrobial susceptibility testing will be verified by standard  methods. Verigene test will not distinguish E. coli from Shigella species including Shigella dysenteriae, Shigella flexneri, Shigella boydii, and Shigella sonnei. Specimens containing Shigella species or E. coli will be reported as positive for E. coli.     Klebsiella pneumoniae 04/12/2023 Not Detected  Not Detected Final     Klebsiella oxytoca 04/12/2023 Not Detected  Not Detected Final     Pseudomonas aeruginosa 04/12/2023 Not Detected  Not Detected Final     CTX-M 04/12/2023 Not Detected  Not Detected, NA Final     KPC 04/12/2023 Not Detected  Not Detected, NA Final     NDM 04/12/2023 Not Detected  Not Detected, NA Final     VIM 04/12/2023 Not Detected  Not Detected, NA Final     IMP 04/12/2023 Not Detected  Not Detected, NA Final     OXA 04/12/2023 Not Detected  Not Detected, NA Final     WBC Count 04/13/2023 8.7  4.0 - 11.0 10e3/uL Final     RBC Count 04/13/2023 3.50 (L)  3.80 - 5.20 10e6/uL Final     Hemoglobin 04/13/2023 9.1 (L)  11.7 - 15.7 g/dL Final     Hematocrit 04/13/2023 29.0 (L)  35.0 - 47.0 % Final     MCV 04/13/2023 83  78 - 100 fL Final     MCH 04/13/2023 26.0 (L)  26.5 - 33.0 pg Final     MCHC 04/13/2023 31.4 (L)  31.5 - 36.5 g/dL Final     RDW 04/13/2023 16.8 (H)  10.0 - 15.0 % Final     Platelet Count 04/13/2023 137 (L)  150 - 450 10e3/uL Final     Sodium 04/13/2023 142  136 - 145 mmol/L Final     Potassium 04/13/2023 3.8  3.4 - 5.3 mmol/L Final     Chloride 04/13/2023 110 (H)  98 - 107 mmol/L Final     Carbon Dioxide (CO2) 04/13/2023 17 (L)  22 - 29 mmol/L Final     Anion Gap 04/13/2023 15  7 - 15 mmol/L Final     Urea Nitrogen 04/13/2023 14.3  6.0 - 20.0 mg/dL Final     Creatinine 04/13/2023 1.11 (H)  0.51 - 0.95 mg/dL Final     Calcium 04/13/2023 8.5 (L)  8.6 - 10.0 mg/dL Final     Glucose 04/13/2023 89  70 - 99 mg/dL Final     Alkaline Phosphatase 04/13/2023 139 (H)  35 - 104 U/L Final     AST 04/13/2023 20  10 - 35 U/L Final     ALT 04/13/2023 10  10 - 35 U/L Final     Protein Total  04/13/2023 5.5 (L)  6.4 - 8.3 g/dL Final     Albumin 04/13/2023 2.4 (L)  3.5 - 5.2 g/dL Final     Bilirubin Total 04/13/2023 0.3  <=1.2 mg/dL Final     GFR Estimate 04/13/2023 72  >60 mL/min/1.73m2 Final    eGFR calculated using 2021 CKD-EPI equation.     WBC Count 04/14/2023 6.6  4.0 - 11.0 10e3/uL Final     RBC Count 04/14/2023 3.34 (L)  3.80 - 5.20 10e6/uL Final     Hemoglobin 04/14/2023 8.6 (L)  11.7 - 15.7 g/dL Final     Hematocrit 04/14/2023 27.6 (L)  35.0 - 47.0 % Final     MCV 04/14/2023 83  78 - 100 fL Final     MCH 04/14/2023 25.7 (L)  26.5 - 33.0 pg Final     MCHC 04/14/2023 31.2 (L)  31.5 - 36.5 g/dL Final     RDW 04/14/2023 16.9 (H)  10.0 - 15.0 % Final     Platelet Count 04/14/2023 136 (L)  150 - 450 10e3/uL Final     Sodium 04/14/2023 139  136 - 145 mmol/L Final     Potassium 04/14/2023 3.4  3.4 - 5.3 mmol/L Final     Chloride 04/14/2023 110 (H)  98 - 107 mmol/L Final     Carbon Dioxide (CO2) 04/14/2023 17 (L)  22 - 29 mmol/L Final     Anion Gap 04/14/2023 12  7 - 15 mmol/L Final     Urea Nitrogen 04/14/2023 12.1  6.0 - 20.0 mg/dL Final     Creatinine 04/14/2023 1.15 (H)  0.51 - 0.95 mg/dL Final     Calcium 04/14/2023 8.2 (L)  8.6 - 10.0 mg/dL Final     Glucose 04/14/2023 77  70 - 99 mg/dL Final     GFR Estimate 04/14/2023 69  >60 mL/min/1.73m2 Final    eGFR calculated using 2021 CKD-EPI equation.

## 2023-05-10 ENCOUNTER — PATIENT OUTREACH (OUTPATIENT)
Dept: CARE COORDINATION | Facility: CLINIC | Age: 23
End: 2023-05-10
Payer: COMMERCIAL

## 2023-05-24 ENCOUNTER — PATIENT OUTREACH (OUTPATIENT)
Dept: CARE COORDINATION | Facility: CLINIC | Age: 23
End: 2023-05-24
Payer: COMMERCIAL

## 2023-06-12 ENCOUNTER — MEDICAL CORRESPONDENCE (OUTPATIENT)
Dept: HEALTH INFORMATION MANAGEMENT | Facility: CLINIC | Age: 23
End: 2023-06-12
Payer: COMMERCIAL

## 2023-07-07 ENCOUNTER — APPOINTMENT (OUTPATIENT)
Dept: ULTRASOUND IMAGING | Facility: CLINIC | Age: 23
End: 2023-07-07
Attending: EMERGENCY MEDICINE
Payer: COMMERCIAL

## 2023-07-07 ENCOUNTER — HOSPITAL ENCOUNTER (EMERGENCY)
Facility: CLINIC | Age: 23
Discharge: HOME | End: 2023-07-07
Attending: EMERGENCY MEDICINE | Admitting: EMERGENCY MEDICINE
Payer: COMMERCIAL

## 2023-07-07 VITALS
BODY MASS INDEX: 44.95 KG/M2 | SYSTOLIC BLOOD PRESSURE: 137 MMHG | DIASTOLIC BLOOD PRESSURE: 81 MMHG | HEART RATE: 108 BPM | WEIGHT: 287 LBS | RESPIRATION RATE: 16 BRPM | TEMPERATURE: 98.2 F | OXYGEN SATURATION: 98 %

## 2023-07-07 DIAGNOSIS — N93.9 VAGINAL BLEEDING: ICD-10-CM

## 2023-07-07 LAB
ABO/RH(D): NORMAL
ANION GAP SERPL CALCULATED.3IONS-SCNC: 12 MMOL/L (ref 7–15)
ANTIBODY SCREEN: NEGATIVE
BASOPHILS # BLD AUTO: 0 10E3/UL (ref 0–0.2)
BASOPHILS NFR BLD AUTO: 0 %
BUN SERPL-MCNC: 10.7 MG/DL (ref 6–20)
CALCIUM SERPL-MCNC: 9.5 MG/DL (ref 8.6–10)
CHLORIDE SERPL-SCNC: 103 MMOL/L (ref 98–107)
CREAT SERPL-MCNC: 1.05 MG/DL (ref 0.51–0.95)
DEPRECATED HCO3 PLAS-SCNC: 22 MMOL/L (ref 22–29)
EOSINOPHIL # BLD AUTO: 0.1 10E3/UL (ref 0–0.7)
EOSINOPHIL NFR BLD AUTO: 1 %
ERYTHROCYTE [DISTWIDTH] IN BLOOD BY AUTOMATED COUNT: 14.5 % (ref 10–15)
GFR SERPL CREATININE-BSD FRML MDRD: 77 ML/MIN/1.73M2
GLUCOSE SERPL-MCNC: 98 MG/DL (ref 70–99)
HCT VFR BLD AUTO: 38.8 % (ref 35–47)
HGB BLD-MCNC: 12.7 G/DL (ref 11.7–15.7)
IMM GRANULOCYTES # BLD: 0 10E3/UL
IMM GRANULOCYTES NFR BLD: 0 %
INR PPP: 1.01 (ref 0.85–1.15)
LYMPHOCYTES # BLD AUTO: 1.5 10E3/UL (ref 0.8–5.3)
LYMPHOCYTES NFR BLD AUTO: 15 %
MCH RBC QN AUTO: 27.4 PG (ref 26.5–33)
MCHC RBC AUTO-ENTMCNC: 32.7 G/DL (ref 31.5–36.5)
MCV RBC AUTO: 84 FL (ref 78–100)
MONOCYTES # BLD AUTO: 0.4 10E3/UL (ref 0–1.3)
MONOCYTES NFR BLD AUTO: 4 %
NEUTROPHILS # BLD AUTO: 8 10E3/UL (ref 1.6–8.3)
NEUTROPHILS NFR BLD AUTO: 80 %
NRBC # BLD AUTO: 0 10E3/UL
NRBC BLD AUTO-RTO: 0 /100
PLATELET # BLD AUTO: 271 10E3/UL (ref 150–450)
POTASSIUM SERPL-SCNC: 4.3 MMOL/L (ref 3.4–5.3)
RBC # BLD AUTO: 4.64 10E6/UL (ref 3.8–5.2)
SODIUM SERPL-SCNC: 137 MMOL/L (ref 136–145)
SPECIMEN EXPIRATION DATE: NORMAL
TSH SERPL DL<=0.005 MIU/L-ACNC: 2.68 UIU/ML (ref 0.3–4.2)
WBC # BLD AUTO: 10.1 10E3/UL (ref 4–11)

## 2023-07-07 PROCEDURE — 80048 BASIC METABOLIC PNL TOTAL CA: CPT | Performed by: EMERGENCY MEDICINE

## 2023-07-07 PROCEDURE — 36415 COLL VENOUS BLD VENIPUNCTURE: CPT | Performed by: EMERGENCY MEDICINE

## 2023-07-07 PROCEDURE — 99284 EMERGENCY DEPT VISIT MOD MDM: CPT | Performed by: EMERGENCY MEDICINE

## 2023-07-07 PROCEDURE — 99284 EMERGENCY DEPT VISIT MOD MDM: CPT | Mod: 25 | Performed by: EMERGENCY MEDICINE

## 2023-07-07 PROCEDURE — 85014 HEMATOCRIT: CPT | Performed by: EMERGENCY MEDICINE

## 2023-07-07 PROCEDURE — 85610 PROTHROMBIN TIME: CPT | Performed by: EMERGENCY MEDICINE

## 2023-07-07 PROCEDURE — 86901 BLOOD TYPING SEROLOGIC RH(D): CPT | Performed by: EMERGENCY MEDICINE

## 2023-07-07 PROCEDURE — 86850 RBC ANTIBODY SCREEN: CPT | Performed by: EMERGENCY MEDICINE

## 2023-07-07 PROCEDURE — 84443 ASSAY THYROID STIM HORMONE: CPT | Performed by: EMERGENCY MEDICINE

## 2023-07-07 PROCEDURE — 76830 TRANSVAGINAL US NON-OB: CPT

## 2023-07-07 ASSESSMENT — ACTIVITIES OF DAILY LIVING (ADL): ADLS_ACUITY_SCORE: 35

## 2023-07-07 NOTE — DISCHARGE INSTRUCTIONS
Follow up with OB/GYN.   Be seen if persistent, uncontrolled bleeding or other worsening symptoms.

## 2023-07-07 NOTE — ED NOTES
Patient presents today for heavy vaginal bleeding that started this AM at 0930. This is the first period since delivery 13 weeks ago. Patient reports she is bleeding through a tampon every 20 minutes and saturated 4-5 size 1 infant diapers. Bleeding is associated with cramping and quarter sized clots. Hx of high blood pressure in pregnancy.

## 2023-07-07 NOTE — ED TRIAGE NOTES
States starting today has had a lot of vaginal bleeding. 3 months post partum. States she went through a tampon in 20 minutes. Also seeing clots. Also dizzy

## 2023-07-07 NOTE — ED PROVIDER NOTES
History     Chief Complaint   Patient presents with     Vaginal Bleeding     HPI  Samantha Cody is a 22 year old female who presents to the emergency department with concerns regarding heavy vaginal bleeding.  Patient reports she has a 13-week-old infant who was born via vaginal delivery, and this is the patient's first menstrual cycle ever since delivery of baby.  Patient is bottlefeeding.  Took ibuprofen today, however takes no daily prescription medications with the exception of thyroid medications.  Has been changing her pads, and tampon frequently, approximately every 20 to 30 minutes ever since this morning.  She has begun feeling a little lightheaded, and dizzy.    Allergies:  No Known Allergies    Problem List:    Patient Active Problem List    Diagnosis Date Noted     Pyelonephritis, acute 2023     Priority: Medium     Sepsis, due to unspecified organism, unspecified whether acute organ dysfunction present (H) 2023     Priority: Medium     Chronic hypertension affecting pregnancy 2023     Priority: Medium      (spontaneous vaginal delivery) 2023     Priority: Medium     Chronic hypertension in pregnancy 2023     Priority: Medium     Acquired hypothyroidism 2023     Priority: Medium     Morbid obesity (H) 2022     Priority: Medium     Multiple joint pain 2015     Priority: Medium        Past Medical History:    Past Medical History:   Diagnosis Date     VALENTINO (acute kidney injury) (H)      Anemia due to blood loss, acute      Hypothyroidism      Pyelonephritis      Sepsis (H)        Past Surgical History:    Past Surgical History:   Procedure Laterality Date     NO HISTORY OF SURGERY         Family History:    Family History   Problem Relation Age of Onset     Hypertension Mother      Hypertension Father      Thyroid Disease Paternal Grandmother      Gynecology Other         ovarian cancer age:25 -paternal aunt       Social History:  Marital Status:   Single [1]  Social History     Tobacco Use     Smoking status: Every Day     Types: Vaping Device     Smokeless tobacco: Never     Tobacco comments:     cutting down with pregnancy   Vaping Use     Vaping Use: Every day     Substances: Nicotine     Devices: Disposable   Substance Use Topics     Alcohol use: Not Currently     Drug use: No        Medications:    levothyroxine (SYNTHROID/LEVOTHROID) 25 MCG tablet          Review of Systems  See HPI  Physical Exam   BP: (!) 142/108  Pulse: 108  Temp: 98.2  F (36.8  C)  Resp: 16  Weight: 130.2 kg (287 lb)  SpO2: 98 %      Physical Exam  /81   Pulse 108   Temp 98.2  F (36.8  C) (Tympanic)   Resp 16   Wt 130.2 kg (287 lb)   SpO2 98%   BMI 44.95 kg/m    General: alert and in no acute distress  Head: atraumatic, normocephalic  Abd: nondistended  Musculoskel/Extremities: normal extremities, no apparent edema, and full AROM of major joints  Skin: no rashes, no diaphoresis and skin color normal  Neuro: Patient awake, alert, oriented, speech is fluent, gait is normal  Psychiatric: affect/mood normal, cooperative, normal judgement/insight and memory intact      ED Course                 Procedures              Critical Care time:  none               Results for orders placed or performed during the hospital encounter of 07/07/23 (from the past 24 hour(s))   CBC with platelets differential    Narrative    The following orders were created for panel order CBC with platelets differential.  Procedure                               Abnormality         Status                     ---------                               -----------         ------                     CBC with platelets and d...[[id number]]                      Final result                 Please view results for these tests on the individual orders.   INR   Result Value Ref Range    INR 1.01 0.85 - 1.15   ABO/Rh type and screen    Narrative    The following orders were created for panel order ABO/Rh type and  screen.  Procedure                               Abnormality         Status                     ---------                               -----------         ------                     Adult Type and Screen[[id number]]                            Edited Result - FINAL        Please view results for these tests on the individual orders.   Basic metabolic panel   Result Value Ref Range    Sodium 137 136 - 145 mmol/L    Potassium 4.3 3.4 - 5.3 mmol/L    Chloride 103 98 - 107 mmol/L    Carbon Dioxide (CO2) 22 22 - 29 mmol/L    Anion Gap 12 7 - 15 mmol/L    Urea Nitrogen 10.7 6.0 - 20.0 mg/dL    Creatinine 1.05 (H) 0.51 - 0.95 mg/dL    Calcium 9.5 8.6 - 10.0 mg/dL    Glucose 98 70 - 99 mg/dL    GFR Estimate 77 >60 mL/min/1.73m2   TSH with free T4 reflex   Result Value Ref Range    TSH 2.68 0.30 - 4.20 uIU/mL   CBC with platelets and differential   Result Value Ref Range    WBC Count 10.1 4.0 - 11.0 10e3/uL    RBC Count 4.64 3.80 - 5.20 10e6/uL    Hemoglobin 12.7 11.7 - 15.7 g/dL    Hematocrit 38.8 35.0 - 47.0 %    MCV 84 78 - 100 fL    MCH 27.4 26.5 - 33.0 pg    MCHC 32.7 31.5 - 36.5 g/dL    RDW 14.5 10.0 - 15.0 %    Platelet Count 271 150 - 450 10e3/uL    % Neutrophils 80 %    % Lymphocytes 15 %    % Monocytes 4 %    % Eosinophils 1 %    % Basophils 0 %    % Immature Granulocytes 0 %    NRBCs per 100 WBC 0 <1 /100    Absolute Neutrophils 8.0 1.6 - 8.3 10e3/uL    Absolute Lymphocytes 1.5 0.8 - 5.3 10e3/uL    Absolute Monocytes 0.4 0.0 - 1.3 10e3/uL    Absolute Eosinophils 0.1 0.0 - 0.7 10e3/uL    Absolute Basophils 0.0 0.0 - 0.2 10e3/uL    Absolute Immature Granulocytes 0.0 <=0.4 10e3/uL    Absolute NRBCs 0.0 10e3/uL   Adult Type and Screen   Result Value Ref Range    ABO/RH(D) O POS     Antibody Screen Negative Negative    SPECIMEN EXPIRATION DATE [id number]    US Pelvic Complete with Transvaginal    Narrative    EXAM: US PELVIC TRANSABDOMINAL AND TRANSVAGINAL  LOCATION: [facility]  DATE:  7/7/2023    INDICATION: Heavy vaginal bleeding. Lower abdominal pain.  COMPARISON: CT abdomen and pelvis on 4/12/2023  TECHNIQUE: Transabdominal scans were performed. Endovaginal ultrasound was performed to better visualize the adnexa.    FINDINGS:    UTERUS: 9.7 x 4.7 x 4.4.    ENDOMETRIUM: 29 mm. There is approximately 0.8 x 0.9 x 1.0 cm hypoechoic area in the lower uterine segment, could represent an endometrial polyp versus submucosal fibroid.    RIGHT OVARY: 3.6 x 2.0 x 2.2 cm. Normal.    LEFT OVARY: 2.5 x 1.7 x 2.4 cm. Normal.    No significant free fluid.      Impression    IMPRESSION:  1.  Thickened heterogenous endometrial stripe measuring approximately 2.9 cm in thickness. There is approximately 1 cm hypoechoic focus in the lower uterine segment/cervix, indeterminate, could represent a small endometrial polyp versus submucosal   fibroid.             Medications - No data to display    Assessments & Plan (with Medical Decision Making)  22 year old female presenting with heavy vaginal bleeding.  This began during the day today, over the past 6 hours.  Patient arrives slightly hypertensive, slightly tachycardic.  Has not had menstrual cycle since delivery of her 13-week old.    Patient without significant episodes of heavy vaginal bleeding prior to pregnancy.  Laboratory work-up shows hemoglobin which is normal at 12.7, which is actually increased compared to prior values.    The pelvic ultrasound is performed that shows thickened endometrial stripe measuring 2.9 centimeters in thickness, with hypoechoic focus which could represent small polyp versus submucosal fibroid.  This is potentially contributing to the patient's menorrhagia.  Patient is likely also experiencing her first menstrual bleed after pregnancy.  Combined with potential polyp versus fibroid, may be experiencing heavier bleeding as a result of this.    Patient is hemodynamically stable.  Her heart rate improved without treatment.  Her blood  "counts are normal.  I did offer oral contraceptive pill therapy to help decrease and lessen the bleeding, however patient \"goes crazy\" when taking oral contraceptive pills, and would like to defer and closely monitor bleeding going forward.  Bleeding had lessened during her ED course.  I feel this is reasonable, and patient will return or be seen if worsening bleeding.  OB/GYN referral placed for ongoing follow-up.     I have reviewed the nursing notes.    I have reviewed the findings, diagnosis, plan and need for follow up with the patient.             New Prescriptions    No medications on file       Final diagnoses:   Vaginal bleeding       7/7/2023   [facility] EMERGENCY DEPT     Mingo Caro MD  07/07/23 1807    "

## 2023-08-17 ENCOUNTER — MEDICAL CORRESPONDENCE (OUTPATIENT)
Dept: HEALTH INFORMATION MANAGEMENT | Facility: CLINIC | Age: 23
End: 2023-08-17
Payer: COMMERCIAL

## 2023-09-16 ENCOUNTER — HEALTH MAINTENANCE LETTER (OUTPATIENT)
Age: 23
End: 2023-09-16

## 2023-10-04 ENCOUNTER — HOSPITAL ENCOUNTER (OUTPATIENT)
Dept: ULTRASOUND IMAGING | Facility: CLINIC | Age: 23
Discharge: HOME | End: 2023-10-04
Attending: NURSE PRACTITIONER | Admitting: NURSE PRACTITIONER
Payer: COMMERCIAL

## 2023-10-04 DIAGNOSIS — R92.8 BI-RADS CATEGORY 3 MAMMOGRAM RESULT: ICD-10-CM

## 2023-10-04 PROCEDURE — 76642 ULTRASOUND BREAST LIMITED: CPT | Mod: LT

## 2023-10-11 ENCOUNTER — VIRTUAL VISIT (OUTPATIENT)
Dept: OBGYN | Facility: CLINIC | Age: 23
End: 2023-10-11

## 2023-10-11 DIAGNOSIS — Z34.80 PRENATAL CARE OF MULTIGRAVIDA, ANTEPARTUM: Primary | ICD-10-CM

## 2023-10-11 PROCEDURE — 99207 PR NO CHARGE NURSE ONLY: CPT

## 2023-10-11 NOTE — PATIENT INSTRUCTIONS
Learning About Pregnancy  Your Care Instructions     Your health in the early weeks of your pregnancy is particularly important for your baby's health. Take good care of yourself. Anything you do that harms your body can also harm your baby.  Make sure to go to all of your doctor appointments. Regular checkups will help keep you and your baby healthy.  How can you care for yourself at home?  Diet    Eat a balanced diet. Make sure your diet includes plenty of beans, peas, and leafy green vegetables.     Do not skip meals or go for many hours without eating. If you are nauseated, try to eat a small, healthy snack every 2 to 3 hours.     Do not eat fish that has a high level of mercury, such as shark, swordfish, or mackerel. Do not eat more than one can of tuna each week.     Drink plenty of fluids. If you have kidney, heart, or liver disease and have to limit fluids, talk with your doctor before you increase the amount of fluids you drink.     Cut down on caffeine, such as coffee, tea, and cola.     Do not drink alcohol, such as beer, wine, or hard liquor.     Take a multivitamin that contains at least 400 micrograms (mcg) of folic acid to help prevent birth defects. Fortified cereal and whole wheat bread are good additional sources of folic acid.     Increase the calcium in your diet. Try to drink a quart of skim milk each day. You may also take calcium supplements and choose foods such as cheese and yogurt.   Lifestyle    Make sure you go to your follow-up appointments.     Get plenty of rest. You may be unusually tired while you are pregnant.     Get at least 30 minutes of exercise on most days of the week. Walking is a good choice. If you have not exercised in the past, start out slowly. Take several short walks each day.     Do not smoke. If you need help quitting, talk to your doctor about stop-smoking programs. These can increase your chances of quitting for good.     Do not touch cat feces or litter boxes.  Also, wash your hands after you handle raw meat, and fully cook all meat before you eat it. Wear gloves when you work in the yard or garden, and wash your hands well when you are done. Cat feces, raw or undercooked meat, and contaminated dirt can cause an infection that may harm your baby or lead to a miscarriage.     Avoid things that can make your body too hot and may be harmful to your baby, such as a hot tub or sauna. Or talk with your doctor before doing anything that raises your body temperature. Your doctor can tell you if it's safe.     Avoid chemical fumes, paint fumes, or poisons.     Do not use illegal drugs, marijuana, or alcohol.   Medicines    Review all of your medicines with your doctor. Some of your routine medicines may need to be changed to protect your baby.     Use acetaminophen (Tylenol) to relieve minor problems, such as a mild headache or backache or a mild fever with cold symptoms. Do not use nonsteroidal anti-inflammatory drugs (NSAIDs), such as ibuprofen (Advil, Motrin) or naproxen (Aleve), unless your doctor says it is okay.     Do not take two or more pain medicines at the same time unless the doctor told you to. Many pain medicines have acetaminophen, which is Tylenol. Too much acetaminophen (Tylenol) can be harmful.     Take your medicines exactly as prescribed. Call your doctor if you think you are having a problem with your medicine.   To manage morning sickness    If you feel sick when you first wake up, try eating a small snack (such as crackers) before you get out of bed. Allow some time to digest the snack, and then get out of bed slowly.     Do not skip meals or go for long periods without eating. An empty stomach can make nausea worse.     Eat small, frequent meals instead of three large meals each day.     Drink plenty of fluids.     Eat foods that are high in protein but low in fat.     If you are taking iron supplements, ask your doctor if they are necessary. Iron can make  "nausea worse.     Avoid any smells, such as coffee, that make you feel sick.     Get lots of rest. Morning sickness may be worse when you are tired.   Follow-up care is a key part of your treatment and safety. Be sure to make and go to all appointments, and call your doctor if you are having problems. It's also a good idea to know your test results and keep a list of the medicines you take.  Where can you learn more?  Go to https://www.Talenz.net/patiented  Enter E868 in the search box to learn more about \"Learning About Pregnancy.\"  Current as of: November 9, 2022               Content Version: 13.7    8050-9870 [email or web address].   Care instructions adapted under license by your healthcare professional. If you have questions about a medical condition or this instruction, always ask your healthcare professional. [email or web address] disclaims any warranty or liability for your use of this information.      Weeks 6 to 10 of Your Pregnancy: Care Instructions  During these weeks of pregnancy, your body goes through many changes. You may start to feel different, both in your body and your emotions. Each pregnancy is different, so there's no \"right\" way to feel. These early weeks are a time to make healthy choices for you and your pregnancy.    Take a daily prenatal vitamin. Choose one with folic acid in it.   Avoid alcohol, tobacco, and drugs (including marijuana). If you need help quitting, talk to your doctor.     Drink plenty of liquids.  Be sure to drink enough water. And limit sodas, other sweetened drinks, and caffeine.     Choose foods that are good sources of calcium, iron, and folate.  You can try dairy products, dark leafy greens, fortified orange juice and cereals, almonds, broccoli, dried fruit, and beans.     Avoid foods that may be harmful.  Don't eat raw meat, deli meat, raw seafood, or raw eggs. Avoid soft cheese and unpasteurized dairy, like Brie and blue cheese. And don't eat fish that " "contains a lot of mercury, like shark and swordfish.     Don't touch yosvany litter or cat poop.  They can cause an infection that could be harmful during pregnancy.     Avoid things that can make your body too hot.  For example, avoid hot tubs and saunas.     Soothe morning sickness.  Try eating 5 or 6 small meals a day, getting some fresh air, or using kirstin to control symptoms.     Ask your doctor about flu and COVID-19 shots.  Getting them can help protect against infection.   Follow-up care is a key part of your treatment and safety. Be sure to make and go to all appointments, and call your doctor if you are having problems. It's also a good idea to know your test results and keep a list of the medicines you take.  Where can you learn more?  Go to https://www.Mobisante.Inspivia/patiented  Enter G112 in the search box to learn more about \"Weeks 6 to 10 of Your Pregnancy: Care Instructions.\"  Current as of: November 9, 2022               Content Version: 13.7 2006-2023 [employer].   Care instructions adapted under license by your healthcare professional. If you have questions about a medical condition or this instruction, always ask your healthcare professional. [employer] disclaims any warranty or liability for your use of this information.         Managing Morning Sickness (01:55)  Your health professional recommends that you watch this short online health video.  Learn tips for dealing with morning sickness, no matter what time of day you have it.  Purpose:  Gives tips for managing morning sickness, including eating small low-fat meals and avoiding caffeine and spicy food.  Goal:  The user will learn tips for dealing with morning sickness during pregnancy.     How to watch the video    Scan the QR code   OR Visit the website    [email or web address]   Current as of: November 9, 2022               Content Version: 13.7 2006-2023 [employer].   Care instructions " adapted under license by your healthcare professional. If you have questions about a medical condition or this instruction, always ask your healthcare professional. Healthwise, [employer] disclaims any warranty or liability for your use of this information.      Pregnancy and Heartburn: Care Instructions  Overview     Heartburn is a common problem during pregnancy.  Heartburn happens when stomach acid backs up into the tube that carries food to the stomach. This tube is called the esophagus. Early in pregnancy, heartburn is caused by hormone changes that slow down digestion. Later on, it's also caused by the large uterus pushing up on the stomach.  Even though you can't fix the cause, there are things you can do to get relief. Treating heartburn during pregnancy focuses first on making lifestyle changes, like changing what and how you eat, and on taking medicines.  Heartburn usually improves or goes away after childbirth.  Follow-up care is a key part of your treatment and safety. Be sure to make and go to all appointments, and call your doctor if you are having problems. It's also a good idea to know your test results and keep a list of the medicines you take.  How can you care for yourself at home?  Eat small, frequent meals.  Avoid foods that make your symptoms worse, such as chocolate, peppermint, and spicy foods. Avoid drinks with caffeine, such as coffee, tea, and sodas.  Avoid bending over or lying down after meals.  Take a short walk after you eat.  If heartburn is a problem at night, do not eat for 2 hours before bedtime.  Take antacids like Mylanta, Maalox, Rolaids, or Tums. Do not take antacids that have sodium bicarbonate, magnesium trisilicate, or aspirin. Be careful when you take over-the-counter antacid medicines. Many of these medicines have aspirin in them. While you are pregnant, do not take aspirin or medicines that contain aspirin unless your doctor says it is okay.  If you're not getting  "relief, talk to your doctor. You may be able to take a stronger acid-reducing medicine.  When should you call for help?   Call your doctor now or seek immediate medical care if:    You have new or worse belly pain.     You are vomiting.   Watch closely for changes in your health, and be sure to contact your doctor if:    You have new or worse symptoms of reflux.     You are losing weight.     You have trouble or pain swallowing.     You do not get better as expected.   Where can you learn more?  Go to https://www.Quirky.net/patiented  Enter U946 in the search box to learn more about \"Pregnancy and Heartburn: Care Instructions.\"  Current as of: November 9, 2022               Content Version: 13.7 2006-2023 [employer].   Care instructions adapted under license by your healthcare professional. If you have questions about a medical condition or this instruction, always ask your healthcare professional. [employer] disclaims any warranty or liability for your use of this information.      Constipation: Care Instructions  Overview     Constipation means that you have a hard time passing stools (bowel movements). People pass stools from 3 times a day to once every 3 days. What is normal for you may be different. Constipation may occur with pain in the rectum and cramping. The pain may get worse when you try to pass stools. Sometimes there are small amounts of bright red blood on toilet paper or the surface of stools. This is because of enlarged veins near the rectum (hemorrhoids).  A few changes in your diet and lifestyle may help you avoid ongoing constipation. Your doctor may also prescribe medicine to help loosen your stool.  Some medicines can cause constipation. These include pain medicines and antidepressants. Tell your doctor about all the medicines you take. Your doctor may want to make a medicine change to ease your symptoms.  Follow-up care is a key part of your treatment and " "safety. Be sure to make and go to all appointments, and call your doctor if you are having problems. It's also a good idea to know your test results and keep a list of the medicines you take.  How can you care for yourself at home?  Drink plenty of fluids. If you have kidney, heart, or liver disease and have to limit fluids, talk with your doctor before you increase the amount of fluids you drink.  Include high-fiber foods in your diet each day. These include fruits, vegetables, beans, and whole grains.  Get at least 30 minutes of exercise on most days of the week. Walking is a good choice. You also may want to do other activities, such as running, swimming, cycling, or playing tennis or team sports.  Take a fiber supplement, such as Citrucel or Metamucil, every day. Read and follow all instructions on the label.  Schedule time each day for a bowel movement. A daily routine may help. Take your time having a bowel movement, but don't sit for more than 10 minutes at a time. And don't strain too much.  Support your feet with a small step stool when you sit on the toilet. This helps flex your hips and places your pelvis in a squatting position.  Your doctor may recommend an over-the-counter laxative to relieve your constipation. Examples are Milk of Magnesia and MiraLax. Read and follow all instructions on the label. Do not use laxatives on a long-term basis.  When should you call for help?   Call your doctor now or seek immediate medical care if:    You have new or worse belly pain.     You have new or worse nausea or vomiting.     You have blood in your stools.   Watch closely for changes in your health, and be sure to contact your doctor if:    Your constipation is getting worse.     You do not get better as expected.   Where can you learn more?  Go to https://www.healthwise.net/patiented  Enter P343 in the search box to learn more about \"Constipation: Care Instructions.\"  Current as of: March 22, " 2023               Content Version: 13.7    3471-6360 [employer].   Care instructions adapted under license by your healthcare professional. If you have questions about a medical condition or this instruction, always ask your healthcare professional. [employer] disclaims any warranty or liability for your use of this information.      Vaginal Bleeding During Pregnancy: Care Instructions  Overview     It's common to have some vaginal spotting when you are pregnant. In some cases, the bleeding isn't serious. And there aren't any more problems with the pregnancy.  But sometimes bleeding is a sign of a more serious problem. This is more common if the bleeding is heavy or painful. Examples of more serious problems include miscarriage, an ectopic pregnancy, and a problem with the placenta.  You may have to see your doctor again to be sure everything is okay. You may also need more tests to find the cause of the bleeding.  Home treatment may be all you need. But it depends on what is causing the bleeding. Be sure to tell your doctor if you have any new symptoms or if your symptoms get worse.  The doctor has checked you carefully, but problems can develop later. If you notice any problems or new symptoms, get medical treatment right away.  Follow-up care is a key part of your treatment and safety. Be sure to make and go to all appointments, and call your doctor if you are having problems. It's also a good idea to know your test results and keep a list of the medicines you take.  How can you care for yourself at home?  If your doctor prescribed medicines, take them exactly as directed. Call your doctor if you think you are having a problem with your medicine.  Do not have vaginal sex until your doctor says it's okay.  Do not put anything in your vagina until your doctor says it's okay.  Ask your doctor about other activities you can or can't do.  Get a lot of rest. Being pregnant can make you  "tired.  Do not use nonsteroidal anti-inflammatory drugs (NSAIDs), such as ibuprofen (Advil, Motrin), naproxen (Aleve), or aspirin, unless your doctor says it is okay.  When should you call for help?   Call 911 anytime you think you may need emergency care. For example, call if:    You passed out (lost consciousness).     You have severe vaginal bleeding. This means you are soaking through a pad each hour for 2 or more hours.     You have sudden, severe pain in your belly or pelvis.   Call your doctor now or seek immediate medical care if:    You have new or worse vaginal bleeding.     You are dizzy or lightheaded, or you feel like you may faint.     You have pain in your belly, pelvis, or lower back.     You think that you are in labor.     You have a sudden release of fluid from your vagina.     You've been having regular contractions for an hour. This means that you've had at least 8 contractions within 1 hour or at least 4 contractions within 20 minutes, even after you change your position and drink fluids.     You notice that your baby has stopped moving or is moving much less than normal.   Watch closely for changes in your health, and be sure to contact your doctor if you have any problems.  Where can you learn more?  Go to https://www.[email or web address].net/patiented  Enter N829 in the search box to learn more about \"Vaginal Bleeding During Pregnancy: Care Instructions.\"  Current as of: November 9, 2022               Content Version: 13.7    2655-9409 [employer].   Care instructions adapted under license by your healthcare professional. If you have questions about a medical condition or this instruction, always ask your healthcare professional. [employer] disclaims any warranty or liability for your use of this information.      Nutrition During Pregnancy: Care Instructions  Overview     Healthy eating when you are pregnant is important for you and your baby. It can help you feel well and " have a successful pregnancy and delivery. During pregnancy your nutrition needs increase. Even if you have excellent eating habits, your doctor may recommend a multivitamin to make sure you get enough iron and folic acid.  You may wonder how much weight you should gain. In general, if you were at a healthy weight before you became pregnant, then you should gain between 25 and 35 pounds. If you were overweight before pregnancy, then you'll likely be advised to gain 15 to 25 pounds. If you were underweight before pregnancy, then you'll probably be advised to gain 28 to 40 pounds. Your doctor will work with you to set a weight goal that is right for you. Gaining a healthy amount of weight helps you have a healthy baby.  Follow-up care is a key part of your treatment and safety. Be sure to make and go to all appointments, and call your doctor if you are having problems. It's also a good idea to know your test results and keep a list of the medicines you take.  How can you care for yourself at home?  Eat plenty of fruits and vegetables. Include a variety of orange, yellow, and leafy dark-green vegetables every day.  Choose whole-grain bread, cereal, and pasta. Good choices include whole wheat bread, whole wheat pasta, brown rice, and oatmeal.  Get 4 or more servings of milk and milk products each day. Good choices include nonfat or low-fat milk, yogurt, and cheese. If you cannot eat milk products, you can get calcium from calcium-fortified products such as orange juice, soy milk, and tofu. Other non-milk sources of calcium include leafy green vegetables, such as broccoli, kale, mustard greens, turnip greens, bok alexis, and brussels sprouts.  If you eat meat, pick lower-fat types. Good choices include lean cuts of meat and chicken or turkey without the skin.  Do not eat shark, swordfish, linda mackerel, or tilefish. They have high levels of mercury, which is dangerous to your baby. You can eat up to 12 ounces a week of fish  "or shellfish that have low mercury levels. Good choices include shrimp, wild salmon, pollock, and catfish. Limit some other types of fish, such as white (albacore) tuna, to 4 oz (0.1 kg) a week.  Heat lunch meats (such as turkey, ham, or bologna) to 165 F before you eat them. This reduces your risk of getting sick from a kind of bacteria that can be found in lunch meats.  Do not eat unpasteurized soft cheeses, such as brie, feta, fresh mozzarella, and blue cheese. They have a bacteria that could harm your baby.  Limit caffeine. If you drink coffee or tea, have no more than 1 cup a day. Caffeine is also found in juan.  Do not drink any alcohol. No amount of alcohol has been found to be safe during pregnancy.  Do not diet or try to lose weight. For example, do not follow a low-carbohydrate diet. If you are overweight at the start of your pregnancy, your doctor will work with you to manage your weight gain.  Tell your doctor about all vitamins and supplements you take.  When should you call for help?  Watch closely for changes in your health, and be sure to contact your doctor if you have any problems.  Where can you learn more?  Go to https://www.Lollipuff.net/patiented  Enter Y785 in the search box to learn more about \"Nutrition During Pregnancy: Care Instructions.\"  Current as of: March 1, 2023               Content Version: 13.7    7286-3926 [employer].   Care instructions adapted under license by your healthcare professional. If you have questions about a medical condition or this instruction, always ask your healthcare professional. [employer] disclaims any warranty or liability for your use of this information.      Exercise During Pregnancy: Care Instructions  Your Care Instructions     Exercise is good for healthy pregnant women. It can relieve back pain, swelling, and other discomforts of pregnancy. It also prepares your muscles for childbirth. And exercise can improve your " energy level and help you sleep better.  If your doctor recommends it, get more exercise. Walking is a good choice. Bit by bit, increase the amount you walk every day. Try for at least 30 minutes on most days of the week. But if you do not already exercise, be sure to talk with your doctor before you start a new exercise program. Try exercise classes for pregnant women. Doctors do not recommend contact sports during pregnancy.  Follow-up care is a key part of your treatment and safety. Be sure to make and go to all appointments, and call your doctor if you are having problems. It's also a good idea to know your test results and keep a list of the medicines you take.  How can you care for yourself at home?  Talk with your doctor about the right kind of exercise for each stage of pregnancy.  Listen to your body to know if your exercise is at a safe level.  Do not become overheated while you exercise. High body temperature can be harmful to your baby. Avoid activities that can make your body too hot.  If you feel tired, take it easy. You might walk instead of run.  If you are used to strenuous exercise, pay attention to changes in your body that mean it is time to slow down.  If you exercised before getting pregnant, you should be able to keep up your routine early in your pregnancy. That might include running and aerobics. Later, you may want to switch to swimming or walking.  Eat a small snack or drink juice 15 to 30 minutes before you exercise.  Eat a healthy diet. Make sure it includes plenty of beans, peas, and leafy green vegetables. You may need to increase how much you eat to get extra energy for exercise.  Drink plenty of fluids before, during, and after exercise.  Avoid contact sports, such as soccer and basketball. Also avoid scuba diving, exercise in high altitude (above 6,000 feet), and horseback riding.  Do not get overtired while you exercise. You should be able to talk while you work out.  After your  "fourth month of pregnancy, avoid exercises (such as sit-ups and some yoga poses) that require you to lie flat on your back on a hard surface.  Try swimming and brisk walking during all your pregnancy.  Get plenty of rest. You may be very tired while you are pregnant.  Where can you learn more?  Go to https://www.Agile.net/patiented  Enter S801 in the search box to learn more about \"Exercise During Pregnancy: Care Instructions.\"  Current as of: November 9, 2022               Content Version: 13.7    6797-8732 [employer].   Care instructions adapted under license by your healthcare professional. If you have questions about a medical condition or this instruction, always ask your healthcare professional. [employer] disclaims any warranty or liability for your use of this information.      "

## 2023-10-11 NOTE — PROGRESS NOTES
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  Past Medical History  Have you ever recieved care for your mental health? : No  Have you ever been in a major accident or suffered serious trauma?: No  Within the last year, has anyone hit, slapped, kicked or otherwise hurt you?: No  In the last year, has anyone forced you to have sex when you didn't want to?: No    Past Medical History 2   Have you ever received a blood transfusion?: No  Would you accept a blood transfusion if was medically recommended?: Yes  Does anyone in your home smoke?: (!) Yes (patient)   Is your blood type Rh negative?: No  Have you ever ?: (!) Yes (only 4 weeks)  Have you been hospitalized for a nonsurgical reason excluding normal delivery?: No  Have you ever had an abnormal pap smear?: No    Past Medical History (Continued)  Do you have a history of abnormalities of the uterus?: No  Did your mother take LOGAN or any other hormones when she was pregnant with you?: No  Do you have any other problems we have not asked about which you feel may be important to this pregnancy?: No

## 2023-10-17 DIAGNOSIS — E03.9 ACQUIRED HYPOTHYROIDISM: ICD-10-CM

## 2023-10-17 RX ORDER — LEVOTHYROXINE SODIUM 25 UG/1
50 TABLET ORAL DAILY
Qty: 50 TABLET | Refills: 3 | Status: CANCELLED | OUTPATIENT
Start: 2023-10-17

## 2023-10-17 RX ORDER — LEVOTHYROXINE SODIUM 25 UG/1
50 TABLET ORAL DAILY
Qty: 30 TABLET | Refills: 0 | Status: SHIPPED | OUTPATIENT
Start: 2023-10-17 | End: 2023-10-30

## 2023-10-17 NOTE — TELEPHONE ENCOUNTER
"Last Written Prescription Date:  23  Last Fill Quantity: 50,  # refills: 3   Last office visit: 23 for   Future Office Visit:   Next 5 appointments (look out 90 days)      Oct 27, 2023  8:30 AM  OB Visit with Nadege Floyd MD, [place] [facility] RM 2  [facility] ([facility] [address] [address]  [phone]                 Requested Prescriptions   Pending Prescriptions Disp Refills    levothyroxine (SYNTHROID/LEVOTHROID) 25 MCG tablet 50 tablet 3     Sig: Take 2 tablets (50 mcg) by mouth daily       Thyroid Protocol Failed - 10/17/2023  1:06 PM        Failed - No active pregnancy on record     If patient is pregnant or has had a positive pregnancy test, please check TSH.          Passed - Patient is 12 years or older        Passed - Recent (12 mo) or future (30 days) visit within the authorizing provider's specialty     Patient has had an office visit with the authorizing provider or a provider within the authorizing providers department within the previous 12 mos or has a future within next 30 days. See \"Patient Info\" tab in inbasket, or \"Choose Columns\" in Meds & Orders section of the refill encounter.              Passed - Medication is active on med list        Passed - Normal TSH on file in past 12 months     Recent Labs   Lab Test 23  1549   TSH 2.68              Passed - No positive pregnancy test in past 12 months     If patient is pregnant or has had a positive pregnancy test, please check TSH.             Routing refill request to provider for review/approval because:  Pt is 6+4 wks with second pregnancy - failing protocol  Pt seeing provider next week but will run out of synthroid before appt    Please review and advise or sign    RADHA Macedo  [facility]          "

## 2023-10-25 NOTE — PATIENT INSTRUCTIONS
Learning About Pregnancy  Your Care Instructions     Your health in the early weeks of your pregnancy is particularly important for your baby's health. Take good care of yourself. Anything you do that harms your body can also harm your baby.  Make sure to go to all of your doctor appointments. Regular checkups will help keep you and your baby healthy.  How can you care for yourself at home?  Diet    Eat a balanced diet. Make sure your diet includes plenty of beans, peas, and leafy green vegetables.     Do not skip meals or go for many hours without eating. If you are nauseated, try to eat a small, healthy snack every 2 to 3 hours.     Do not eat fish that has a high level of mercury, such as shark, swordfish, or mackerel. Do not eat more than one can of tuna each week.     Drink plenty of fluids. If you have kidney, heart, or liver disease and have to limit fluids, talk with your doctor before you increase the amount of fluids you drink.     Cut down on caffeine, such as coffee, tea, and cola.     Do not drink alcohol, such as beer, wine, or hard liquor.     Take a multivitamin that contains at least 400 micrograms (mcg) of folic acid to help prevent birth defects. Fortified cereal and whole wheat bread are good additional sources of folic acid.     Increase the calcium in your diet. Try to drink a quart of skim milk each day. You may also take calcium supplements and choose foods such as cheese and yogurt.   Lifestyle    Make sure you go to your follow-up appointments.     Get plenty of rest. You may be unusually tired while you are pregnant.     Get at least 30 minutes of exercise on most days of the week. Walking is a good choice. If you have not exercised in the past, start out slowly. Take several short walks each day.     Do not smoke. If you need help quitting, talk to your doctor about stop-smoking programs. These can increase your chances of quitting for good.     Do not touch cat feces or litter boxes.  Also, wash your hands after you handle raw meat, and fully cook all meat before you eat it. Wear gloves when you work in the yard or garden, and wash your hands well when you are done. Cat feces, raw or undercooked meat, and contaminated dirt can cause an infection that may harm your baby or lead to a miscarriage.     Avoid things that can make your body too hot and may be harmful to your baby, such as a hot tub or sauna. Or talk with your doctor before doing anything that raises your body temperature. Your doctor can tell you if it's safe.     Avoid chemical fumes, paint fumes, or poisons.     Do not use illegal drugs, marijuana, or alcohol.   Medicines    Review all of your medicines with your doctor. Some of your routine medicines may need to be changed to protect your baby.     Use acetaminophen (Tylenol) to relieve minor problems, such as a mild headache or backache or a mild fever with cold symptoms. Do not use nonsteroidal anti-inflammatory drugs (NSAIDs), such as ibuprofen (Advil, Motrin) or naproxen (Aleve), unless your doctor says it is okay.     Do not take two or more pain medicines at the same time unless the doctor told you to. Many pain medicines have acetaminophen, which is Tylenol. Too much acetaminophen (Tylenol) can be harmful.     Take your medicines exactly as prescribed. Call your doctor if you think you are having a problem with your medicine.   To manage morning sickness    If you feel sick when you first wake up, try eating a small snack (such as crackers) before you get out of bed. Allow some time to digest the snack, and then get out of bed slowly.     Do not skip meals or go for long periods without eating. An empty stomach can make nausea worse.     Eat small, frequent meals instead of three large meals each day.     Drink plenty of fluids.     Eat foods that are high in protein but low in fat.     If you are taking iron supplements, ask your doctor if they are necessary. Iron can make  "nausea worse.     Avoid any smells, such as coffee, that make you feel sick.     Get lots of rest. Morning sickness may be worse when you are tired.   Follow-up care is a key part of your treatment and safety. Be sure to make and go to all appointments, and call your doctor if you are having problems. It's also a good idea to know your test results and keep a list of the medicines you take.  Where can you learn more?  Go to https://www.IntegenX.net/patiented  Enter E868 in the search box to learn more about \"Learning About Pregnancy.\"  Current as of: November 9, 2022               Content Version: 13.7    1735-1092 [employer].   Care instructions adapted under license by your healthcare professional. If you have questions about a medical condition or this instruction, always ask your healthcare professional. [employer] disclaims any warranty or liability for your use of this information.      Weeks 6 to 10 of Your Pregnancy: Care Instructions  During these weeks of pregnancy, your body goes through many changes. You may start to feel different, both in your body and your emotions. Each pregnancy is different, so there's no \"right\" way to feel. These early weeks are a time to make healthy choices for you and your pregnancy.    Take a daily prenatal vitamin. Choose one with folic acid in it.   Avoid alcohol, tobacco, and drugs (including marijuana). If you need help quitting, talk to your doctor.     Drink plenty of liquids.  Be sure to drink enough water. And limit sodas, other sweetened drinks, and caffeine.     Choose foods that are good sources of calcium, iron, and folate.  You can try dairy products, dark leafy greens, fortified orange juice and cereals, almonds, broccoli, dried fruit, and beans.     Avoid foods that may be harmful.  Don't eat raw meat, deli meat, raw seafood, or raw eggs. Avoid soft cheese and unpasteurized dairy, like Brie and blue cheese. And don't eat fish that " "contains a lot of mercury, like shark and swordfish.     Don't touch yosvany litter or cat poop.  They can cause an infection that could be harmful during pregnancy.     Avoid things that can make your body too hot.  For example, avoid hot tubs and saunas.     Soothe morning sickness.  Try eating 5 or 6 small meals a day, getting some fresh air, or using kirstin to control symptoms.     Ask your doctor about flu and COVID-19 shots.  Getting them can help protect against infection.   Follow-up care is a key part of your treatment and safety. Be sure to make and go to all appointments, and call your doctor if you are having problems. It's also a good idea to know your test results and keep a list of the medicines you take.  Where can you learn more?  Go to https://www.Recommendi.Groupiter/patiented  Enter G112 in the search box to learn more about \"Weeks 6 to 10 of Your Pregnancy: Care Instructions.\"  Current as of: November 9, 2022               Content Version: 13.7 2006-2023 [employer].   Care instructions adapted under license by your healthcare professional. If you have questions about a medical condition or this instruction, always ask your healthcare professional. [employer] disclaims any warranty or liability for your use of this information.         Managing Morning Sickness (01:55)  Your health professional recommends that you watch this short online health video.  Learn tips for dealing with morning sickness, no matter what time of day you have it.  Purpose:  Gives tips for managing morning sickness, including eating small low-fat meals and avoiding caffeine and spicy food.  Goal:  The user will learn tips for dealing with morning sickness during pregnancy.     How to watch the video    Scan the QR code   OR Visit the website    https://hwi.se/[email or web address]   Current as of: November 9, 2022               Content Version: 13.7 2006-2023 [employer].   Care instructions " adapted under license by your healthcare professional. If you have questions about a medical condition or this instruction, always ask your healthcare professional. Healthwise, [employer] disclaims any warranty or liability for your use of this information.      Pregnancy and Heartburn: Care Instructions  Overview     Heartburn is a common problem during pregnancy.  Heartburn happens when stomach acid backs up into the tube that carries food to the stomach. This tube is called the esophagus. Early in pregnancy, heartburn is caused by hormone changes that slow down digestion. Later on, it's also caused by the large uterus pushing up on the stomach.  Even though you can't fix the cause, there are things you can do to get relief. Treating heartburn during pregnancy focuses first on making lifestyle changes, like changing what and how you eat, and on taking medicines.  Heartburn usually improves or goes away after childbirth.  Follow-up care is a key part of your treatment and safety. Be sure to make and go to all appointments, and call your doctor if you are having problems. It's also a good idea to know your test results and keep a list of the medicines you take.  How can you care for yourself at home?  Eat small, frequent meals.  Avoid foods that make your symptoms worse, such as chocolate, peppermint, and spicy foods. Avoid drinks with caffeine, such as coffee, tea, and sodas.  Avoid bending over or lying down after meals.  Take a short walk after you eat.  If heartburn is a problem at night, do not eat for 2 hours before bedtime.  Take antacids like Mylanta, Maalox, Rolaids, or Tums. Do not take antacids that have sodium bicarbonate, magnesium trisilicate, or aspirin. Be careful when you take over-the-counter antacid medicines. Many of these medicines have aspirin in them. While you are pregnant, do not take aspirin or medicines that contain aspirin unless your doctor says it is okay.  If you're not getting  "relief, talk to your doctor. You may be able to take a stronger acid-reducing medicine.  When should you call for help?   Call your doctor now or seek immediate medical care if:    You have new or worse belly pain.     You are vomiting.   Watch closely for changes in your health, and be sure to contact your doctor if:    You have new or worse symptoms of reflux.     You are losing weight.     You have trouble or pain swallowing.     You do not get better as expected.   Where can you learn more?  Go to https://www.Twist.net/patiented  Enter U946 in the search box to learn more about \"Pregnancy and Heartburn: Care Instructions.\"  Current as of: November 9, 2022               Content Version: 13.7 2006-2023 [employer].   Care instructions adapted under license by your healthcare professional. If you have questions about a medical condition or this instruction, always ask your healthcare professional. [employer] disclaims any warranty or liability for your use of this information.      Constipation: Care Instructions  Overview     Constipation means that you have a hard time passing stools (bowel movements). People pass stools from 3 times a day to once every 3 days. What is normal for you may be different. Constipation may occur with pain in the rectum and cramping. The pain may get worse when you try to pass stools. Sometimes there are small amounts of bright red blood on toilet paper or the surface of stools. This is because of enlarged veins near the rectum (hemorrhoids).  A few changes in your diet and lifestyle may help you avoid ongoing constipation. Your doctor may also prescribe medicine to help loosen your stool.  Some medicines can cause constipation. These include pain medicines and antidepressants. Tell your doctor about all the medicines you take. Your doctor may want to make a medicine change to ease your symptoms.  Follow-up care is a key part of your treatment and " "safety. Be sure to make and go to all appointments, and call your doctor if you are having problems. It's also a good idea to know your test results and keep a list of the medicines you take.  How can you care for yourself at home?  Drink plenty of fluids. If you have kidney, heart, or liver disease and have to limit fluids, talk with your doctor before you increase the amount of fluids you drink.  Include high-fiber foods in your diet each day. These include fruits, vegetables, beans, and whole grains.  Get at least 30 minutes of exercise on most days of the week. Walking is a good choice. You also may want to do other activities, such as running, swimming, cycling, or playing tennis or team sports.  Take a fiber supplement, such as Citrucel or Metamucil, every day. Read and follow all instructions on the label.  Schedule time each day for a bowel movement. A daily routine may help. Take your time having a bowel movement, but don't sit for more than 10 minutes at a time. And don't strain too much.  Support your feet with a small step stool when you sit on the toilet. This helps flex your hips and places your pelvis in a squatting position.  Your doctor may recommend an over-the-counter laxative to relieve your constipation. Examples are Milk of Magnesia and MiraLax. Read and follow all instructions on the label. Do not use laxatives on a long-term basis.  When should you call for help?   Call your doctor now or seek immediate medical care if:    You have new or worse belly pain.     You have new or worse nausea or vomiting.     You have blood in your stools.   Watch closely for changes in your health, and be sure to contact your doctor if:    Your constipation is getting worse.     You do not get better as expected.   Where can you learn more?  Go to https://www.healthwise.net/patiented  Enter P343 in the search box to learn more about \"Constipation: Care Instructions.\"  Current as of: March 22, " "2023               Content Version: 13.7 2006-2023 [employer].   Care instructions adapted under license by your healthcare professional. If you have questions about a medical condition or this instruction, always ask your healthcare professional. [employer] disclaims any warranty or liability for your use of this information.      Learning About High-Iron Foods  What foods are high in iron?     The foods you eat contain nutrients, such as vitamins and minerals. Iron is a nutrient. Your body needs the right amount to stay healthy and work as it should. You can use the list below to help you make choices about which foods to eat.  Here are some foods that contain iron. They have 1 to 2 milligrams of iron per serving.  Fruits  Figs (dried), 5 figs  Vegetables  Asparagus (canned), 6 li  Sandhya, beet, Swiss chard, or turnip greens, 1 cup  Dried peas, cooked,   cup  Seaweed, spirulina (dried),   cup  Spinach, (cooked)   cup or (raw) 1 cup  Grains  Cereals, fortified with iron, 1 cup  Grits (instant, cooked), fortified with iron,   cup  Meats and other protein foods  Beans (kidney, lima, navy, white), canned or cooked,   cup  Beef or lamb, 3 oz  Chicken giblets, 3 oz  Chickpeas (garbanzo beans),   cup  Liver of beef, lamb, or pork, 3 oz  Oysters (cooked), 3 oz  Sardines (canned), 3 oz  Soybeans (boiled),   cup  Tofu (firm),   cup  Work with your doctor to find out how much of this nutrient you need. Depending on your health, you may need more or less of it in your diet.  Where can you learn more?  Go to https://www.healthBinary Thumb.net/patiented  Enter R005 in the search box to learn more about \"Learning About High-Iron Foods.\"  Current as of: March 1, 2023               Content Version: 13.7 2006-2023 [employer].   Care instructions adapted under license by your healthcare professional. If you have questions about a medical condition or this instruction, always ask your " "healthcare professional. [email or web address], [facility] disclaims any warranty or liability for your use of this information.      Rh Antibodies Screening During Pregnancy: About This Test  What is it?     The Rh antibodies screening test is a blood test. It checks your blood for Rh antibodies. If you have Rh-negative blood and have been exposed to Rh-positive blood, your immune system may make antibodies to attack the Rh-positive blood. When a pregnant woman has these antibodies, it is called Rh sensitization.  Why is this test done?  The Rh antibodies screening test is done during pregnancy to find out if your baby is at risk for Rh disease. This can happen if you have Rh-negative blood and your baby has Rh-positive blood. If your Rh-negative blood mixes with Rh-positive blood, your immune system will make antibodies to attack the Rh-positive blood.  During pregnancy, these antibodies could attach to the baby's red blood cells. This can cause your baby to have serious health problems. The results of this test will help your doctor know how to best care for you and your baby during your pregnancy.  How do you prepare for the test?  In general, there's nothing you have to do before this test, unless your doctor tells you to.  How is the test done?  A health professional uses a needle to take a blood sample, usually from the arm.  What happens after the test?  You will probably be able to go home right away. It depends on the reason for the test.  You can go back to your usual activities right away.  Follow-up care is a key part of your treatment and safety. Be sure to make and go to all appointments, and call your doctor if you are having problems. It's also a good idea to keep a list of the medicines you take. Ask your doctor when you can expect to have your test results.  Where can you learn more?  Go to https://www.[employer].net/patiented  Enter P722 in the search box to learn more about \"Rh Antibodies Screening " "During Pregnancy: About This Test.\"  Current as of: 2022               Content Version: 13.7    9588-0907 [employer].   Care instructions adapted under license by your healthcare professional. If you have questions about a medical condition or this instruction, always ask your healthcare professional. [employer] disclaims any warranty or liability for your use of this information.      Learning About Preventing Rh Disease  What is Rh disease?     Rh disease can be a serious problem in pregnancy. It happens when substances called antibodies in the mother's blood cause red blood cells in her baby's blood to be destroyed. This can occur when the blood types of a mother and her baby do not match.  All blood has an Rh factor. This is what makes a blood type positive or negative. When you are Rh-negative, your baby may be Rh-negative or Rh-positive. If your baby has Rh-positive blood and it mixes with yours, your body will make antibodies. This is called Rh sensitization.  Most of the time, this is not a problem in a first pregnancy. But in future pregnancies, it could cause Rh disease.  A  with Rh disease has mild anemia and may have jaundice. In severe cases, anemia, jaundice, and swelling can be very dangerous or fatal. Some babies need to be delivered early. Some need special care in the NICU. A very sick baby will need a blood transfusion before or after birth.  Fortunately, Rh sensitization is usually easy to prevent.  That's why it's important to get your Rh status checked in your first trimester. It doesn't cause any warning signs. A blood test is the only way to know if you are Rh-sensitive or are at risk for it.  How can you prevent Rh disease?  If you are Rh-negative, your doctor gives you an Rh immune globulin shot (such as RhoGAM). It helps prevent your body from making the antibodies that attack your baby's red blood cells.  Timing is important. You need the " "shot at certain times during your pregnancy. And you need one anytime there is a chance that your baby's blood might mix with yours. That can happen with certain prenatal tests or when you have pregnancy bleeding, such as:  Right after any pregnancy loss, amniocentesis, or CVS testing.  After turning of a breech baby.  Before and maybe after childbirth. Your doctor gives you a shot around week 28. If your  is Rh-positive, you will have another shot.  Follow-up care is a key part of your treatment and safety. Be sure to make and go to all appointments, and call your doctor if you are having problems. It's also a good idea to know your test results and keep a list of the medicines you take.  Where can you learn more?  Go to https://www.Tempolib.Betyah/patiented  Enter W177 in the search box to learn more about \"Learning About Preventing Rh Disease.\"  Current as of: 2022               Content Version: 13.7    6618-8689 [employer]   Care instructions adapted under license by your healthcare professional. If you have questions about a medical condition or this instruction, always ask your healthcare professional. [employer] disclaims any warranty or liability for your use of this information.      Learning About Rh Immunoglobulin Shots  Introduction     An Rh immunoglobulin shot is given to pregnant women who have Rh-negative blood.  You may have Rh-negative blood, and your baby may have Rh-positive blood. If the two types of blood mix, your body will make antibodies. This is called Rh sensitization. Most of the time, this is not a problem the first time you're pregnant. But it could cause problems in future pregnancies.  This shot keeps your body from making the antibodies. You get the shot around 28 weeks of pregnancy. After the birth, your baby's blood is tested. If the blood is Rh positive, you will get another shot. You may also get the shot if you have vaginal bleeding " "while you are pregnant or if you have a miscarriage. These shots protect future pregnancies.  Women with Rh negative blood will need this shot each time they get pregnant.  Example  Rh immunoglobulin (HypRho-D, MICRhoGAM, and RhoGAM)  Possible side effects  Rare side effects may include:  Some mild pain where you got the shot.  A slight fever.  An allergic reaction.  You may have other side effects not listed here. Check the information that comes with your medicine.  What to know about taking this medicine  You may need more than one shot. You may need the shot again:  After amniocentesis, fetal blood sampling, or chorionic villus sampling tests.  If you have bleeding in your second or third trimester.  After turning of a breech baby.  After an injury to the belly while you are pregnant.  After a miscarriage or an .  Before or right after treatment for an ectopic or a partial molar pregnancy.  Tell your doctor if you have any allergies or have had a bad response to medicines in the past.  If you get this shot within 3 months of getting a live-virus vaccine, the vaccine may not work. Your doctor will tell you if you need more vaccine.  Check with your doctor or pharmacist before you use any other medicines. This includes over-the-counter medicines. Make sure your doctor knows all of the medicines, vitamins, herbs, and supplements you take. Taking some medicines at the same time can cause problems.  Where can you learn more?  Go to https://www.[employer].net/patiented  Enter V615 in the search box to learn more about \"Learning About Rh Immunoglobulin Shots.\"  Current as of: 2022               Content Version: 13.7    5822-4832 [employer].   Care instructions adapted under license by your healthcare professional. If you have questions about a medical condition or this instruction, always ask your healthcare professional. [employer] disclaims any warranty or liability for " your use of this information.      Rubella ([place] Measles): Care Instructions  Overview  Rubella, also called [place] measles or 3-day measles, is a disease caused by a virus. It spreads by coughs, sneezes, and close contact. Rubella usually is mild and does not cause long-term problems. But if you are pregnant and get it, you can give the disease to your unborn baby. This can cause serious birth defects.  While you have rubella, you may get a rash and a mild fever, and the lymph glands in your neck may swell. Older children often have a fever, eye pain, a sore throat, and body aches. You can relieve most symptoms with care at home. Avoid being around others, especially pregnant people, until your rash has been gone for at least 4 days. People who have not had this disease before or have not had the vaccine have the greatest chance of getting the virus.  Follow-up care is a key part of your treatment and safety. Be sure to make and go to all appointments, and call your doctor if you are having problems. It's also a good idea to know your test results and keep a list of the medicines you take.  How can you care for yourself at home?  Drink plenty of fluids. If you have kidney, heart, or liver disease and have to limit fluids, talk with your doctor before you increase the amount of fluids you drink.  Get plenty of rest to help your body heal.  Take an over-the-counter pain medicine, such as acetaminophen (Tylenol), ibuprofen (Advil, Motrin), or naproxen (Aleve), to reduce fever and discomfort. Read and follow all instructions on the label. Do not give aspirin to anyone younger than 20. It has been linked to Reye syndrome, a serious illness.  Do not take two or more pain medicines at the same time unless the doctor told you to. Many pain medicines have acetaminophen, which is Tylenol. Too much acetaminophen (Tylenol) can be harmful.  Try not to scratch the rash. Put cold, wet cloths on the rash to reduce itching.  Do  "not smoke. Smoking can make your symptoms worse. If you need help quitting, talk to your doctor about stop-smoking programs and medicines. These can increase your chances of quitting for good.  Avoid contact with people who have never had rubella and who have not been immunized.  When should you call for help?   Call your doctor now or seek immediate medical care if:    You have a fever with a stiff neck or a severe headache.     You are sensitive to light or feel very sleepy or confused.   Watch closely for changes in your health, and be sure to contact your doctor if:    You do not get better as expected.   Where can you learn more?  Go to https://www.[employer]ech[employer].net/patiented  Enter B812 in the search box to learn more about \"Rubella (Tamazight Measles): Care Instructions.\"  Current as of: 2022               Content Version: 13.7    6097-3394 [employer] [facility].   Care instructions adapted under license by your healthcare professional. If you have questions about a medical condition or this instruction, always ask your healthcare professional. [employer] [facility] disclaims any warranty or liability for your use of this information.      Gonorrhea and Chlamydia: About These Tests  What is it?  These tests use a sample of urine or other body fluid to look for the bacteria that cause these sexually transmitted infections (STIs). The fluid sample can come from the cervix, vagina, rectum, throat, or eyes.  Why is this test done?  These tests may be done to:  Find out if symptoms are caused by gonorrhea or chlamydia.  Check people who are at high risk of being infected with gonorrhea or chlamydia.  Retest people several months after they have been treated for gonorrhea or chlamydia.  Check for infection in your  if you had a gonorrhea or chlamydia infection at the time of delivery.  How can you prepare for the test?  If you are going to have a urine test, do not urinate for at least 1 hour " "before the test.  If you think you may have chlamydia or gonorrhea, don't have sexual intercourse until you get your test results. And you may want to have tests for other STIs, such as HIV.  How is the test done?  For a direct sample, a swab is used to collect body fluid from the cervix, vagina, rectum, throat, or eyes. Your doctor may collect the sample. Or you may be given instructions on how to collect your own sample.  For a urine sample, you will collect the urine that comes out when you first start to urinate. Don't wipe the genital area clean before you urinate.  How long does the test take?  The test will take a few minutes.  What happens after the test?  You will be able to go home right away.  You can go back to your usual activities right away.  If you do have an infection, don't have sexual intercourse for 7 days after you start treatment. And your sex partner(s) should also be treated.  Follow-up care is a key part of your treatment and safety. Be sure to make and go to all appointments, and call your doctor if you are having problems. It's also a good idea to keep a list of the medicines you take. Ask your doctor when you can expect to have your test results.  Where can you learn more?  Go to https://www.[email or web address].net/patiented  Enter K976 in the search box to learn more about \"Gonorrhea and Chlamydia: About These Tests.\"  Current as of: August 2, 2022               Content Version: 13.7    1791-8391 [employer].   Care instructions adapted under license by your healthcare professional. If you have questions about a medical condition or this instruction, always ask your healthcare professional. [employer] [employer] disclaims any warranty or liability for your use of this information.      Trichomoniasis: About This Test  What is it?     This test uses a sample of urine or other body fluid to look for the tiny parasite that causes trichomoniasis (also called trich). The fluid sample " can come from the vagina, cervix, or urethra. Your doctor may choose to use one or more of many available tests.  Why is it done?  A trich test may be done to:  Find out if symptoms are caused by trich.  Check people who are at high risk for being infected with trich.  Check after treatment to make sure that the infection is gone.  How do you prepare for the test?  If you are going to have a urine test, do not urinate for at least 1 hour before the test.  How is the test done?  For a direct sample, a swab is used to collect body fluid from the cervix, vagina, or urethra. Your doctor may collect the sample. Or you may be given instructions on how to collect your own sample.  For a urine sample, you will collect the urine that comes out when you first start to urinate. Don't wipe the area clean before you urinate.  How long does the test take?  It will take a few minutes to collect a sample.  What happens after the test?  You can go home right away.  You can go back to your usual activities right away.  You may get the test results the same day or several days later. It depends on the test used.  If you do have an infection, don't have sexual intercourse for 7 days after you start treatment. Your sex partner(s) should also be treated.  Follow-up care is a key part of your treatment and safety. Be sure to make and go to all appointments, and call your doctor if you are having problems. Ask your doctor when you can expect to have your test results.  Current as of: August 2, 2022               Content Version: 13.7    4451-5966 [employer] [facility].   Care instructions adapted under license by your healthcare professional. If you have questions about a medical condition or this instruction, always ask your healthcare professional. [facility] disclaims any warranty or liability for your use of this information.      HIV Testing: Care Instructions  Overview     You can get tested for the human  "immunodeficiency virus (HIV). This test checks for HIV antibodies and antigens in your blood. If they are found, the test is positive.  If HIV antibodies or antigens are not found, you may need a repeat test to be sure the results are correct. Or your doctor may want to do a different test.  Follow-up care is a key part of your treatment and safety. Be sure to make and go to all appointments, and call your doctor if you are having problems. It's also a good idea to know your test results and keep a list of the medicines you take.  What do the results mean?  Normal result  A normal result means that no HIV antibodies or antigens were found in your blood. And if you had a test that checked for HIV RNA or DNA, none was found. Normal results are called negative.  You may need more testing to be sure the test results are correct.  Uncertain result  Test results don't clearly show whether you have an HIV infection. This is usually called an indeterminate result. This may happen before HIV antibodies or antigens develop. Or it may happen when some other type of antibody or antigen interferes with the results. If this occurs, you will probably have another test right away.  Abnormal result  An abnormal result means that you have HIV antibodies or antigens in your blood. These results are called positive.  A positive test will be confirmed by another type of test. This is because some tests can cause false-positive results. No one is considered HIV-positive until the result is confirmed by a test that shows HIV RNA or DNA in the person's blood.  If your test result is positive, your doctor will talk to you about starting treatment.  Where can you learn more?  Go to https://[email or web address]/patiented  Enter T792 in the search box to learn more about \"HIV Testing: Care Instructions.\"  Current as of: October 31, 2022               Content Version: 13.7    5792-6407 [employer].   Care instructions adapted under " license by your healthcare professional. If you have questions about a medical condition or this instruction, always ask your healthcare professional. [employer] disclaims any warranty or liability for your use of this information.      Hepatitis C Virus Tests: About These Tests  What are they?     Hepatitis C virus tests are blood tests that check for substances in the blood that show whether you have hepatitis C now or had it in the past. The tests can also tell you what type of hepatitis C you have and how severe the disease is. This can help your doctor with treatment.  If the tests show that you have long-term hepatitis C, you need to take steps to prevent spreading the disease.  Why are these tests done?  You may need these tests if:  You have symptoms of hepatitis.  You may have been exposed to the virus. You are more likely to have been exposed to the virus if you inject drugs or are exposed to body fluids (such as if you are a health care worker).  You've had other tests that show you have liver problems.  You are 18 to 79 years old.  You have an HIV infection.  The tests also are done to help your doctor decide about your treatment and see how well it works.  How do you prepare for the test?  In general, there's nothing you have to do before this test, unless your doctor tells you to.  How is the test done?  A health professional uses a needle to take a blood sample, usually from the arm.  What happens after these tests?  You will probably be able to go home right away.  You can go back to your usual activities right away.  Follow-up care is a key part of your treatment and safety. Be sure to make and go to all appointments, and call your doctor if you are having problems. It's also a good idea to keep a list of the medicines you take. Ask your doctor when you can expect to have your test results.  Where can you learn more?  Go to https://www.Smart Panel.net/patiented  Enter W551 in the search  "box to learn more about \"Hepatitis C Virus Tests: About These Tests.\"  Current as of: October 31, 2022               Content Version: 13.7    9182-8815 [employer].   Care instructions adapted under license by your healthcare professional. If you have questions about a medical condition or this instruction, always ask your healthcare professional. [employer] disclaims any warranty or liability for your use of this information.      Learning About Fetal Ultrasound Results  What is a fetal ultrasound?     Fetal ultrasound is a test that lets your doctor see an image of your baby. Your doctor learns information about your baby from this picture. You may find out, for example, if you are having a boy or a girl. But the main reason you have this test is to get information about your baby's health.  (You may hear your baby called a fetus. This is a common medical term for a baby that's growing in the mother's uterus.)  What kind of information can you learn from this test?  The findings of an ultrasound fall into two categories, normal and abnormal.  Normal  The fetus is the right size for its age.  The placenta is the expected size and does not cover the cervix.  There is enough amniotic fluid in the uterus.  No birth defects can be seen.  Abnormal  The fetus is small or large for its age.  The placenta covers the cervix.  There is too much or too little amniotic fluid in the uterus.  The fetus may have a birth defect.  What does an abnormal result mean?  Abnormal seems to imply that something is wrong with your baby. But what it means is that the test has shown something the doctor wants to take a closer look at.  And that's what happens next. Your doctor will talk to you about what further test or tests you may need.  What do the results mean?  Some of the things your doctor may see on an abnormal ultrasound include:  Echogenic bowel.  The bowel looks very bright on the screen. This could " mean that there's blood in the bowel. Or it could mean that something is blocking the small bowel.  Increased nuchal translucency.  The ultrasound measures the thickness at the back of the baby's neck. An increase in thickness is sometimes an early sign of Down syndrome.  Increased or decreased amniotic fluid.  The doctor will look for a reason for the level of amniotic fluid and will watch the pregnancy closely as it progresses.  Large ventricles.  Ventricles in the brain look larger than they should. Your doctor may take a closer look at the brain.  Renal pyelectasis/hydronephrosis.  The ultrasound measures the fluid around the kidney. If there is more fluid than expected, there is a chance of urinary tract or kidney problems.  Short long bones.  The ultrasound measures certain arm and leg bones. A long bone (humerus or femur) that is shorter than average could be a sign of Down syndrome.  Subchorionic hemorrhage.  An ultrasound can show bleeding under one of the membranes that surrounds the fetus. Some women don't have symptoms of bleeding. The ultrasound can find this problem when women are not bleeding from their vagina. Women who have this condition have a slightly higher chance of miscarriage.  What do you do now?  Take a deep breath, and let it out. Keep in mind that an abnormal finding on an ultrasound, after it's coupled with more information, may:  Turn out to be nothing.  Turn out to be something mild that won't affect the baby.  Turn out to be something more serious. But if this happens, early diagnosis helps you and your doctor plan treatment options sooner rather than later.  Your medical team is there for you. So are your family and friends. Ask questions, and get the help and support you need.  Follow-up care is a key part of your treatment and safety. Be sure to make and go to all appointments, and call your doctor if you are having problems. It's also a good idea to know your test results and keep  "a list of the medicines you take.  Where can you learn more?  Go to https://www.[email or web address].net/patiented  Enter K451 in the search box to learn more about \"Learning About Fetal Ultrasound Results.\"  Current as of: November 9, 2022               Content Version: 13.7    9712-5444 [employer].   Care instructions adapted under license by your healthcare professional. If you have questions about a medical condition or this instruction, always ask your healthcare professional. [employer] disclaims any warranty or liability for your use of this information.      Learning About Prenatal Visits  Your Care Instructions     Regular prenatal visits are very important during any pregnancy. These quick office visits may seem simple and routine. But they can help you and your baby stay healthy. Your doctor is watching for problems that can only be found by regularly checking you and your baby. The visits also give you and your doctor time to build a good relationship.  Many women have prenatal visits every 4 to 6 weeks until week 28 of pregnancy. Then the visits become more frequent. This is often every 2 to 3 weeks through week 36 of pregnancy. In the final month of pregnancy, you likely will see your doctor every week. You may have a different schedule if you have a medical problem or are a teen.  At different times in your pregnancy, you will have exams and tests. Some are routine. Others are done only when there is a chance of a problem. Everything healthy you do for your body helps your growing baby. Rest when you need it. Eat well, drink plenty of water, and exercise regularly.  What happens during a prenatal visit?  You will have blood pressure checks, along with urine tests. You also may have blood tests. If you need to go to the bathroom while waiting for the doctor, tell the nurse. He or she will give you a sample cup so your urine can be tested.  You will be weighed and have your belly " measured.  Your doctor may listen to your baby's heartbeat with a special stethoscope.  In your second trimester, your doctor will check your blood sugar (glucose tolerance test) for diabetes that can occur during pregnancy. This is gestational diabetes, which can harm your baby.  You will have tests to check for infections that could harm your . These include group B streptococcus and hepatitis B.  Your doctor may do ultrasounds to check for problems. This also checks your baby's position. An ultrasound uses sound waves to produce a picture of your baby.  You may have other tests at any time during your pregnancy.  Use your visits to discuss with your doctor any concerns you have.  How can you care for yourself at home?  Get plenty of rest.  Exercise every day, if your doctor says it is okay. If you have not exercised in the past, start out slowly. Take many short walks each day.  Eat a balanced diet. Make sure your diet includes plenty of beans, peas, and leafy green vegetables.  Drink plenty of fluids. Cut down on drinks with caffeine, such as coffee, tea, and cola. If you have kidney, heart, or liver disease and have to limit fluids, talk with your doctor before you increase the amount of fluids you drink.  Avoid chemical fumes, paint fumes, and poisons. Do not use alcohol, marijuana, or illegal drugs. Do not smoke, vape, or use tobacco. If you need help quitting, talk to your doctor about stop-smoking programs and medicines. These can increase your chances of quitting for good.  Review all of your medicines with your doctor. Some of your routine medicines may need to be changed to protect your baby. Do not stop or start taking any medicines without talking to your doctor first.  Follow-up care is a key part of your treatment and safety. Be sure to make and go to all appointments, and call your doctor if you are having problems. It's also a good idea to know your test results and keep a list of the  "medicines you take.  Where can you learn more?  Go to https://www.healthVotigo.net/patiented  Enter J502 in the search box to learn more about \"Learning About Prenatal Visits.\"  Current as of: November 9, 2022               Content Version: 13.7    9656-1279 [employer].   Care instructions adapted under license by your healthcare professional. If you have questions about a medical condition or this instruction, always ask your healthcare professional. [employer] disclaims any warranty or liability for your use of this information.      Domestic Abuse: Care Instructions  Overview     If you want to save this information but don't think it is safe to take it home, see if a trusted friend can keep it for you. Plan ahead. Know who you can call for help, and memorize the phone number.   Be careful online too. Your online activity may be seen by others. Do not use your personal computer or device to read about this topic. Use a safe computer such as one at work, a friend's house, or a library.    Domestic abuse is different from an argument now and then. It is a pattern of abuse that one person uses to control another person's behavior. It may start with threats and name-calling. Then, it may lead to more serious acts, like pushing and slapping. The abuse also may occur in other areas. For example, the abuser may withhold money or spend a partner's money without their knowledge.  Abuse can cause serious harm. You are more likely to have a long-term health problem from the injuries and stress of living in a violent relationship. People who are sexually abused by their partners have more sexually transmitted infections and unwanted pregnancies. Anyone can be abused in relationships. Anyone who is abused also faces emotional pain.  If you are pregnant, abuse can cause problems such as poor weight gain, infections, and bleeding. Abuse during this time may increase your baby's risk of low birth " "weight, premature birth, and death.  Follow-up care is a key part of your treatment and safety. Be sure to make and go to all appointments, and call your doctor if you are having problems. It's also a good idea to know your test results and keep a list of the medicines you take.  How can you care for yourself at home?  If you do not have a safe place to stay, discuss this with your doctor before you leave.  Have a plan for where to go, how to leave your house, and where to stay in case of an emergency. Do not tell your partner about your plan. Contact:  The National Domestic Violence Hotline toll-free at 1-526.762.9059. They can help you find resources in your area.  Your local police department, hospital, or clinic for information about shelters and safe homes near you.  Talk to a trusted friend or neighbor or a Spiritism counselor. Do not feel that you have to hide what happened.  Teach your children how to call for help in an emergency.  Be alert to warning signs, such as threats, heavy alcohol use, or drug use. This can help you avoid danger.  If you can, make sure that there are no guns or other weapons in the house.  When should you call for help?   Call 911 anytime you think you may need emergency care. For example, call if:    You or someone else has just been abused.     You think you or someone else is in danger of being abused.   Watch closely for changes in your health, and be sure to contact your doctor if you have any problems.  Where can you learn more?  Go to https://www.[employer].net/patiented  Enter G282 in the search box to learn more about \"Domestic Abuse: Care Instructions.\"  Current as of: February 27, 2023               Content Version: 13.7    2059-3666 [employer].   Care instructions adapted under license by your healthcare professional. If you have questions about a medical condition or this instruction, always ask your healthcare professional. [employer] " disclaims any warranty or liability for your use of this information.      Domestic Violence Safety Instructions: Care Instructions  Overview     If you want to save this information but don't think it is safe to take it home, see if a trusted friend can keep it for you. Plan ahead. Know who you can call for help, and memorize the phone number.   Be careful online too. Your online activity may be seen by others. Do not use your personal computer or device to read about this topic. Use a safe computer such as one at work, a friend's house, or a library.    When you are abused by a spouse or partner, you can take actions to protect yourself and your children.  You can increase your safety whether you decide to stay with your spouse or partner or you decide to leave. You can also prepare an action plan and kit ahead of time. This will allow you to leave quickly when you decide to. Remember, you cannot stop your partner's abuse, but you can find help for you and your children. No one deserves to be abused.  Follow-up care is a key part of your treatment and safety. Be sure to make and go to all appointments, and call your doctor if you are having problems. It's also a good idea to know your test results and keep a list of the medicines you take.  How can you care for yourself at home?  Make a plan for your safety   If you decide to stay with your abusive spouse or partner, you can do the following to increase your safety:  Decide what works best to keep you safe in an emergency.  Decide who you can call to help you in an emergency.  Decide if you will call the police if you get hurt again. If you can, agree on a signal with your children or neighbor to call the police for you if you need help. You can flash lights or hang something out of a window.  Choose a place to go for a short time if you need to leave home. Memorize the address and phone number.  Learn escape routes out of your home in case you need to leave in a  hurry. Teach your children different ways to get out of the house quickly if they need to.  Take objects that can be used as weapons (guns, knives, hammers) and hide them or lock them up.  Learn the number of a domestic violence shelter. Talk to the people there about how they can help.  Find out about other community resources that can help you.  Take pictures of bruises or other injuries if you can. You can also take pictures of things your abuser has broken.  Teach your children that violence is never okay. Tell them that they do not deserve to be hurt.  Pack a bag   Prepare a kit with things you will need if you leave the house suddenly. You can try to hide this in your house, or you can leave it with a friend or relative you can trust. You should include the following items in the kit:  A set of keys to your house and car.  Emergency phone numbers and addresses. You might also want to have a map and a small flashlight in case you need to leave in the night.  Money such as cash or checks. You can also ask a trusted friend or family member to hold money for you.  Copies of legal documents such as house and car titles or rent receipts, birth certificates, Social Security card, voter registration, marriage and 's licenses, and your children's health records.  Personal items you would need for a few days, such as clothes, a toothbrush, toothpaste, and any medicines you or your children need.  A favorite toy or book for your child or children.  Diapers and bottles, if you have very young children.  Pictures that show signs of abuse and violence. You may also add pictures of your abuser.  If you leave   If you decide to leave, you can take the following steps:  Go to the emergency room at a hospital if you have been hurt.  Ask the police to be with you as you leave. They can protect you as you leave the house.  If you decide to leave secretly, remember that activities can be tracked. Your abuser may still have  "access to your cell phone, email, and credit cards. It may be possible for these to be traced. Always be aware of your surroundings.  Take the kit you have prepared. If this is an emergency, do not worry about gathering up anything. Just leave--your safety is most important.  If your abuser moves out, change the locks on the doors. If you have a security system, change the access code.  When should you call for help?   Call 911 anytime you think you may need emergency care. For example, call if:    You or someone else has just been abused.     You think you or someone else is in danger of being abused.   Watch closely for changes in your health, and be sure to contact your doctor if you have any problems.  Where can you learn more?  Go to https://www.[employer].net/patiented  Enter A752 in the search box to learn more about \"Domestic Violence Safety Instructions: Care Instructions.\"  Current as of: October 20, 2022               Content Version: 13.7    8858-4087 [employer].   Care instructions adapted under license by your healthcare professional. If you have questions about a medical condition or this instruction, always ask your healthcare professional. [employer] disclaims any warranty or liability for your use of this information.      Learning About Domestic Abuse  What is domestic abuse?  Domestic abuse is threats or violent behavior in a personal relationship. It can happen between past or current partners or spouses. It's also called domestic violence or intimate partner violence.  Domestic abuse can affect people of any ethnic group, race, or Orthodox. It can affect teens, adults, or the elderly. And it can happen to people of any sexual identity or social status. But most abuse victims are women.  Abusers use fear, bullying, and threats to control their partners. They control what their partners do, where they go, or who they see. They may act jealous, controlling, or " possessive. These early signs of abuse may happen soon after the start of the relationship. Sometimes it can be hard to notice abuse at first. But after the relationship becomes more serious, the abuse may get worse.  If you are being abused in your relationship, it's important to get help. The abuse is not your fault, and you don't have to face it alone.  Be careful  It may not be safe to take home domestic abuse information like this handout. Some people ask a trusted friend to keep it for them. It's also important to plan ahead and to memorize the phone number of places you can go for help. If you are concerned about your safety, do not use your computer, smartphone, or tablet to read about domestic abuse.   What are the types of domestic abuse?  Abuse can be emotional, physical, or sexual.  Emotional abuse  Emotional abuse is a pattern of threats, insults, or controlling behavior. It includes verbal abuse. It goes beyond healthy disagreements in a relationship. It's a sign of an unhealthy relationship, and it may be against the law.  Do you feel threatened, intimidated, or controlled? Does your partner threaten your children, other family members, or pets?  Does your partner:  Use jokes meant to embarrass or shame you?  Call you names?  Tell you that you are a bad parent or threaten to take away your children?  Threaten to have you or your family members deported?  Control your access to money or other basic needs?  Control what you do, who you see or talk to, or where you go?  Another form of emotional abuse is denying that it is happening. Or the abuser may act like the abuse is no big deal or is your fault.  Sexual abuse  With sexual abuse, abusers may try to convince or force you to have sex. They may force you into sex acts you're not comfortable with. Or they may sexually assault you. Sexual abuse can happen even if you are in a committed relationship.  Physical abuse  Physical abuse means that a partner  hits, kicks, or physically hurts you. Physical abuse that starts with a slap might lead to kicking, shoving, and choking over time. The abuser may also threaten to hurt or kill you.  What problems can domestic abuse lead to?  Domestic abuse can be very dangerous. It can cause serious, repeated injury. It can even lead to death.  All forms of abuse can cause long-term health problems from the stress of a violent relationship. Verbal abuse can lead to sexual and physical abuse.  Abuse causes emotional pain, depression, anxiety, and post-traumatic stress. Sexual abuse can lead to sexually transmitted infections (including HIV/AIDS) and unplanned pregnancy.  Pregnancy can be a very dangerous time for people in abusive relationships. Pregnant people who are abused may have anemia, infections, bleeding, or poor weight gain. Abuse during this time may also increase your baby's risk of low birth weight, premature birth, and death.  It can be hard for some victims of domestic abuse to ask for help or to leave their relationship. You may feel scared, stuck, or not sure what steps to take. But it's important not to ignore abuse. Talking to someone could be the first step to ending the abuse and taking care of your own health and happiness again.  Where can you get help?  Talk to a trusted friend. Find a local advocacy group, or talk to your doctor about the abuse.  Contact the National Domestic Violence Hotline at 6-305-284-NZNI (1-483.617.7599) for more safety tips. They can guide you to groups in your area that can help. Or go to the National Coalition Against Domestic Violence website at www.thehotline.org to learn more.  Domestic violence groups or a counselor in your area can help you make a safety plan for yourself and your children.  When to call for help  Call 911 anytime you think you may need emergency care. For example, call if:  You think that you or someone you know is in danger of being abused.  You have been  "hurt and can't have someone safely take you to emergency care.  You have just been abused.  A family member has just been abused.  Where can you learn more?  Go to https://www.[employer].net/patiented  Enter S665 in the search box to learn more about \"Learning About Domestic Abuse.\"  Current as of: February 27, 2023               Content Version: 13.7    2516-1240 [employer].   Care instructions adapted under license by your healthcare professional. If you have questions about a medical condition or this instruction, always ask your healthcare professional. [employer] disclaims any warranty or liability for your use of this information.      Vaginal Bleeding During Pregnancy: Care Instructions  Overview     It's common to have some vaginal spotting when you are pregnant. In some cases, the bleeding isn't serious. And there aren't any more problems with the pregnancy.  But sometimes bleeding is a sign of a more serious problem. This is more common if the bleeding is heavy or painful. Examples of more serious problems include miscarriage, an ectopic pregnancy, and a problem with the placenta.  You may have to see your doctor again to be sure everything is okay. You may also need more tests to find the cause of the bleeding.  Home treatment may be all you need. But it depends on what is causing the bleeding. Be sure to tell your doctor if you have any new symptoms or if your symptoms get worse.  The doctor has checked you carefully, but problems can develop later. If you notice any problems or new symptoms, get medical treatment right away.  Follow-up care is a key part of your treatment and safety. Be sure to make and go to all appointments, and call your doctor if you are having problems. It's also a good idea to know your test results and keep a list of the medicines you take.  How can you care for yourself at home?  If your doctor prescribed medicines, take them exactly as directed. Call " "your doctor if you think you are having a problem with your medicine.  Do not have vaginal sex until your doctor says it's okay.  Do not put anything in your vagina until your doctor says it's okay.  Ask your doctor about other activities you can or can't do.  Get a lot of rest. Being pregnant can make you tired.  Do not use nonsteroidal anti-inflammatory drugs (NSAIDs), such as ibuprofen (Advil, Motrin), naproxen (Aleve), or aspirin, unless your doctor says it is okay.  When should you call for help?   Call 911 anytime you think you may need emergency care. For example, call if:    You passed out (lost consciousness).     You have severe vaginal bleeding. This means you are soaking through a pad each hour for 2 or more hours.     You have sudden, severe pain in your belly or pelvis.   Call your doctor now or seek immediate medical care if:    You have new or worse vaginal bleeding.     You are dizzy or lightheaded, or you feel like you may faint.     You have pain in your belly, pelvis, or lower back.     You think that you are in labor.     You have a sudden release of fluid from your vagina.     You've been having regular contractions for an hour. This means that you've had at least 8 contractions within 1 hour or at least 4 contractions within 20 minutes, even after you change your position and drink fluids.     You notice that your baby has stopped moving or is moving much less than normal.   Watch closely for changes in your health, and be sure to contact your doctor if you have any problems.  Where can you learn more?  Go to https://www.[employer].net/patiented  Enter N829 in the search box to learn more about \"Vaginal Bleeding During Pregnancy: Care Instructions.\"  Current as of: November 9, 2022               Content Version: 13.7    4835-9328 [employer].   Care instructions adapted under license by your healthcare professional. If you have questions about a medical condition or this " instruction, always ask your healthcare professional. [employer] disclaims any warranty or liability for your use of this information.

## 2023-10-26 LAB
ABO/RH(D): NORMAL
ANTIBODY SCREEN: NEGATIVE
SPECIMEN EXPIRATION DATE: NORMAL

## 2023-10-27 ENCOUNTER — MEDICAL CORRESPONDENCE (OUTPATIENT)
Dept: HEALTH INFORMATION MANAGEMENT | Facility: CLINIC | Age: 23
End: 2023-10-27

## 2023-10-27 ENCOUNTER — PRENATAL OFFICE VISIT (OUTPATIENT)
Dept: OBGYN | Facility: CLINIC | Age: 23
End: 2023-10-27
Payer: COMMERCIAL

## 2023-10-27 ENCOUNTER — TELEPHONE (OUTPATIENT)
Dept: OBGYN | Facility: CLINIC | Age: 23
End: 2023-10-27

## 2023-10-27 ENCOUNTER — MYC MEDICAL ADVICE (OUTPATIENT)
Dept: OBGYN | Facility: CLINIC | Age: 23
End: 2023-10-27

## 2023-10-27 VITALS
RESPIRATION RATE: 18 BRPM | BODY MASS INDEX: 45.99 KG/M2 | TEMPERATURE: 97.8 F | SYSTOLIC BLOOD PRESSURE: 120 MMHG | WEIGHT: 293 LBS | HEART RATE: 92 BPM | HEIGHT: 67 IN | DIASTOLIC BLOOD PRESSURE: 83 MMHG

## 2023-10-27 DIAGNOSIS — Z3A.08 8 WEEKS GESTATION OF PREGNANCY: ICD-10-CM

## 2023-10-27 DIAGNOSIS — D64.9 ANEMIA, UNSPECIFIED TYPE: ICD-10-CM

## 2023-10-27 DIAGNOSIS — R11.0 NAUSEA: Primary | ICD-10-CM

## 2023-10-27 DIAGNOSIS — D64.9 ANEMIA, UNSPECIFIED TYPE: Primary | ICD-10-CM

## 2023-10-27 DIAGNOSIS — I10 CHRONIC HYPERTENSION: Primary | ICD-10-CM

## 2023-10-27 DIAGNOSIS — Z13.1 SCREENING FOR DIABETES MELLITUS: ICD-10-CM

## 2023-10-27 DIAGNOSIS — Z11.3 SCREENING FOR STD (SEXUALLY TRANSMITTED DISEASE): ICD-10-CM

## 2023-10-27 LAB
ALBUMIN MFR UR ELPH: 4.6 MG/DL
ALBUMIN UR-MCNC: NEGATIVE MG/DL
ALT SERPL W P-5'-P-CCNC: 12 U/L (ref 0–50)
APPEARANCE UR: CLEAR
AST SERPL W P-5'-P-CCNC: 14 U/L (ref 0–45)
BACTERIA #/AREA URNS HPF: ABNORMAL /HPF
BILIRUB UR QL STRIP: NEGATIVE
C TRACH DNA SPEC QL PROBE+SIG AMP: NEGATIVE
COLOR UR AUTO: YELLOW
CREAT SERPL-MCNC: 0.64 MG/DL (ref 0.51–0.95)
CREAT UR-MCNC: 61.2 MG/DL
EGFRCR SERPLBLD CKD-EPI 2021: >90 ML/MIN/1.73M2
ERYTHROCYTE [DISTWIDTH] IN BLOOD BY AUTOMATED COUNT: 17.1 % (ref 10–15)
FERRITIN SERPL-MCNC: 7 NG/ML (ref 6–175)
GLUCOSE UR STRIP-MCNC: NEGATIVE MG/DL
HBA1C MFR BLD: 5.1 % (ref 0–5.6)
HBV CORE AB SERPL QL IA: NONREACTIVE
HBV SURFACE AB SERPL IA-ACNC: 0.45 M[IU]/ML
HBV SURFACE AB SERPL IA-ACNC: NONREACTIVE M[IU]/ML
HBV SURFACE AG SERPL QL IA: NONREACTIVE
HCT VFR BLD AUTO: 33 % (ref 35–47)
HCV AB SERPL QL IA: NONREACTIVE
HGB BLD-MCNC: 9.8 G/DL (ref 11.7–15.7)
HGB UR QL STRIP: NEGATIVE
HIV 1+2 AB+HIV1 P24 AG SERPL QL IA: NONREACTIVE
IRON BINDING CAPACITY (ROCHE): 341 UG/DL (ref 240–430)
IRON SATN MFR SERPL: 7 % (ref 15–46)
IRON SERPL-MCNC: 24 UG/DL (ref 37–145)
KETONES UR STRIP-MCNC: NEGATIVE MG/DL
LEUKOCYTE ESTERASE UR QL STRIP: ABNORMAL
MCH RBC QN AUTO: 21.3 PG (ref 26.5–33)
MCHC RBC AUTO-ENTMCNC: 29.7 G/DL (ref 31.5–36.5)
MCV RBC AUTO: 72 FL (ref 78–100)
N GONORRHOEA DNA SPEC QL NAA+PROBE: NEGATIVE
NITRATE UR QL: NEGATIVE
PH UR STRIP: 7 [PH] (ref 5–7)
PLATELET # BLD AUTO: 242 10E3/UL (ref 150–450)
PROT/CREAT 24H UR: 0.08 MG/MG CR (ref 0–0.2)
RBC # BLD AUTO: 4.6 10E6/UL (ref 3.8–5.2)
RBC #/AREA URNS AUTO: ABNORMAL /HPF
RUBV IGG SERPL QL IA: 3.03 INDEX
RUBV IGG SERPL QL IA: POSITIVE
SP GR UR STRIP: 1.01 (ref 1–1.03)
SQUAMOUS #/AREA URNS AUTO: ABNORMAL /LPF
T PALLIDUM AB SER QL: NONREACTIVE
TSH SERPL DL<=0.005 MIU/L-ACNC: 2.94 UIU/ML (ref 0.3–4.2)
UROBILINOGEN UR STRIP-ACNC: 0.2 E.U./DL
WBC # BLD AUTO: 6.6 10E3/UL (ref 4–11)
WBC #/AREA URNS AUTO: ABNORMAL /HPF

## 2023-10-27 PROCEDURE — 82565 ASSAY OF CREATININE: CPT | Performed by: OBSTETRICS & GYNECOLOGY

## 2023-10-27 PROCEDURE — 99213 OFFICE O/P EST LOW 20 MIN: CPT | Mod: 25 | Performed by: OBSTETRICS & GYNECOLOGY

## 2023-10-27 PROCEDURE — 84450 TRANSFERASE (AST) (SGOT): CPT | Performed by: OBSTETRICS & GYNECOLOGY

## 2023-10-27 PROCEDURE — 36415 COLL VENOUS BLD VENIPUNCTURE: CPT | Performed by: OBSTETRICS & GYNECOLOGY

## 2023-10-27 PROCEDURE — 86704 HEP B CORE ANTIBODY TOTAL: CPT | Performed by: OBSTETRICS & GYNECOLOGY

## 2023-10-27 PROCEDURE — 81001 URINALYSIS AUTO W/SCOPE: CPT | Performed by: OBSTETRICS & GYNECOLOGY

## 2023-10-27 PROCEDURE — 76817 TRANSVAGINAL US OBSTETRIC: CPT | Performed by: OBSTETRICS & GYNECOLOGY

## 2023-10-27 PROCEDURE — 84443 ASSAY THYROID STIM HORMONE: CPT | Performed by: OBSTETRICS & GYNECOLOGY

## 2023-10-27 PROCEDURE — 86803 HEPATITIS C AB TEST: CPT | Performed by: OBSTETRICS & GYNECOLOGY

## 2023-10-27 PROCEDURE — 83036 HEMOGLOBIN GLYCOSYLATED A1C: CPT | Performed by: OBSTETRICS & GYNECOLOGY

## 2023-10-27 PROCEDURE — 84156 ASSAY OF PROTEIN URINE: CPT | Performed by: OBSTETRICS & GYNECOLOGY

## 2023-10-27 PROCEDURE — 87491 CHLMYD TRACH DNA AMP PROBE: CPT | Performed by: OBSTETRICS & GYNECOLOGY

## 2023-10-27 PROCEDURE — 86900 BLOOD TYPING SEROLOGIC ABO: CPT | Performed by: OBSTETRICS & GYNECOLOGY

## 2023-10-27 PROCEDURE — 83550 IRON BINDING TEST: CPT | Performed by: OBSTETRICS & GYNECOLOGY

## 2023-10-27 PROCEDURE — 86706 HEP B SURFACE ANTIBODY: CPT | Performed by: OBSTETRICS & GYNECOLOGY

## 2023-10-27 PROCEDURE — 87086 URINE CULTURE/COLONY COUNT: CPT | Performed by: OBSTETRICS & GYNECOLOGY

## 2023-10-27 PROCEDURE — 82728 ASSAY OF FERRITIN: CPT | Performed by: OBSTETRICS & GYNECOLOGY

## 2023-10-27 PROCEDURE — 83540 ASSAY OF IRON: CPT | Performed by: OBSTETRICS & GYNECOLOGY

## 2023-10-27 PROCEDURE — 85027 COMPLETE CBC AUTOMATED: CPT | Performed by: OBSTETRICS & GYNECOLOGY

## 2023-10-27 PROCEDURE — 86780 TREPONEMA PALLIDUM: CPT | Performed by: OBSTETRICS & GYNECOLOGY

## 2023-10-27 PROCEDURE — 86850 RBC ANTIBODY SCREEN: CPT | Performed by: OBSTETRICS & GYNECOLOGY

## 2023-10-27 PROCEDURE — 84460 ALANINE AMINO (ALT) (SGPT): CPT | Performed by: OBSTETRICS & GYNECOLOGY

## 2023-10-27 PROCEDURE — 87591 N.GONORRHOEAE DNA AMP PROB: CPT | Performed by: OBSTETRICS & GYNECOLOGY

## 2023-10-27 PROCEDURE — 86901 BLOOD TYPING SEROLOGIC RH(D): CPT | Performed by: OBSTETRICS & GYNECOLOGY

## 2023-10-27 PROCEDURE — 87340 HEPATITIS B SURFACE AG IA: CPT | Performed by: OBSTETRICS & GYNECOLOGY

## 2023-10-27 PROCEDURE — 87389 HIV-1 AG W/HIV-1&-2 AB AG IA: CPT | Performed by: OBSTETRICS & GYNECOLOGY

## 2023-10-27 PROCEDURE — 86762 RUBELLA ANTIBODY: CPT | Performed by: OBSTETRICS & GYNECOLOGY

## 2023-10-27 RX ORDER — ASCORBIC ACID 250 MG
250 TABLET,CHEWABLE ORAL DAILY
Qty: 90 TABLET | Refills: 1 | Status: SHIPPED | OUTPATIENT
Start: 2023-10-27

## 2023-10-27 RX ORDER — MAGNESIUM 200 MG
1000 TABLET ORAL DAILY
Qty: 90 TABLET | Refills: 1 | Status: ON HOLD | OUTPATIENT
Start: 2023-10-27 | End: 2024-05-27

## 2023-10-27 RX ORDER — FERROUS SULFATE 325(65) MG
325 TABLET ORAL
Qty: 90 TABLET | Refills: 1 | Status: SHIPPED | OUTPATIENT
Start: 2023-10-27

## 2023-10-27 NOTE — TELEPHONE ENCOUNTER
Prior Authorization Retail Medication Request    Medication/Dose: B-12 100MCG and Vitamin C 250 MG  ICD code (if different than what is on RX):    Anemia, unspecified type [D64.9]  - Primary        Previously Tried and Failed:    Rationale:      Insurance Name:  Covermy meds   B-12 KEY [id number]       Vitamin C KEY [id number]  Insurance ID:        Pharmacy Information (if different than what is on RX)  Name:  [facility]  Phone:  [phone]

## 2023-10-27 NOTE — PROGRESS NOTES
[facility]    New OB Visit Note    CC: New OB     Subjective:    Samantha is a 22 year old  at 8w0d by Patient's last menstrual period was 2023.  who presents for her initial OB visit. This is a unplanned pregnancy and her and her partner Prashant are excited. She reports feeling well. Denies any uterine cramping, abdominal pain or vaginal bleeding. Pos nausea and vomiting.       Pt reports that she feels safe at home and her mood is good.   Concerns: none    Past OB History: baby ASA for high blood pressure at first visit.   Prenatal Complications: none   Delivery Complications: none    OB History    Para Term  AB Living   2 1 1 0 0 1   SAB IAB Ectopic Multiple Live Births   0 0 0 0 1      # Outcome Date GA Lbr Joshua/2nd Weight Sex Delivery Anes PTL Lv   2 Current            1 Term 23 39w2d 05:49 / 03:21 3.459 kg (7 lb 10 oz) F Vag-Spont EPI N FRANCIS      Name: Rochelle      Apgar1: 9  Apgar5: 9       Past Medical History:   Diagnosis Date    VALENTINO (acute kidney injury) (H24)     Anemia due to blood loss, acute     Hypothyroidism     Pyelonephritis     Sepsis (H)        Past Surgical History:   Procedure Laterality Date    NO HISTORY OF SURGERY         Current Outpatient Medications   Medication Sig Dispense Refill    levothyroxine (SYNTHROID/LEVOTHROID) 25 MCG tablet Take 2 tablets (50 mcg) by mouth daily 30 tablet 0    Prenatal Vit-Fe Fumarate-FA (PRENATAL MULTIVITAMIN  PLUS IRON) 27-1 MG TABS Take 1 tablet by mouth daily      ascorbic acid (VITAMIN C) 250 MG CHEW chewable tablet Take 1 tablet (250 mg) by mouth daily Take with iron supplement. 90 tablet 1    cyanocobalamin 1000 MCG sublingual tablet Place 1 tablet (1,000 mcg) under the tongue daily 90 tablet 1    ferrous sulfate (FEROSUL) 325 (65 Fe) MG tablet Take 1 tablet (325 mg) by mouth daily (with breakfast) 90 tablet 1       Family History   Problem Relation Age of Onset    Hypertension Mother     Hypertension  "Father     Thyroid Disease Paternal Grandmother     Gynecology Other         ovarian cancer age:25 -paternal aunt       Social History     Tobacco Use    Smoking status: Every Day     Types: Vaping Device     Passive exposure: Never    Smokeless tobacco: Never    Tobacco comments:     cutting down with pregnancy   Substance Use Topics    Alcohol use: Not Currently     Comment: occas-quit with pregnancy       ROS: A 10 pt ROS was completed and found to be negative unless mentioned in the HPI.     Objective:    /83 (BP Location: Right arm, Patient Position: Chair, Cuff Size: Adult Large)   Pulse 92   Temp 97.8  F (36.6  C) (Tympanic)   Resp 18   Ht 1.702 m (5' 7\")   Wt 133 kg (293 lb 3.2 oz)   LMP 09/01/2023   BMI 45.92 kg/m      Estimated body mass index is 45.92 kg/m  as calculated from the following:    Height as of this encounter: 1.702 m (5' 7\").    Weight as of this encounter: 133 kg (293 lb 3.2 oz).    General appearance: well-hydrated, A&O x 3, no apparent distress  Lungs: Equal expansion bilaterally, no accessory muscle use  Heart: No heaves or thrills. No peripheral varicosities  Constitutional: See vitals  Abdomen: Soft, non-tender, non-distended. No rebound, rigidity, or guarding.  Extremities: neg edema  Genitourinary:  External genitalia: no erythema, no lesions.   Urethral meatus appropriate location without lesions or prolapse  Urethra: No masses, tenderness, or scarring  Bladder no fullness, masses, or tenderness.  Anus and Perineum: Unremarkable, no visible lesions  Vagina: Normal, healthy pink mucosa without any lesions. Physiologic vaginal discharge.   Uterus: gravid  Adnexa: no masses or tenderness bilaterally.    Bedside Ultrasound    Transvaginal ultrasound was performed. A viable intrauterine pregnancy was seen.      CRL= 1.33 cm   FHT= 161 bpm  EGA= 7 weeks 4 days  EDC based on US = 6/10/24  EDC by LMP= 6/7/24  FINAL EDC= 6/7/24            Assessment and Plan:     Encounter " Diagnoses   Name Primary?    8 weeks gestation of pregnancy     Screening for STD (sexually transmitted disease)     Chronic hypertension Yes    Screening for diabetes mellitus     Anemia, unspecified type        22 year old  at 8w0d  by LMP who presents for her initial OB visit.     1) Plan to draw new OB lab today   2) Discussed routine prenatal care, group practice model at [facility], tertiary support and frequency of visits. Also discussed options for genetic screening tests. Patient desires NIPT for genetic testing.   3) Dating/viability US performed today    4) Concerns: nausea, discussed precautions.  She will try B6 and unisom tablets.  Zofran sent today  5) PMH/OBHx problems: had to take baby ASA last pregnancy for hypertension  6) Medication review: no changes, continue prenatal vitamin   7) Reviewed miscarriage precautions (present to ED or call clinic with abdominal pain, vaginal bleeding or fever)   8) Weight gain: discussed weight gain expectations (BMI <18.5: 28-40lbs, BMI 18.5-25: 25-35lbs, BMI 25-30: 15-25lbs, BMI >30: 11-20lbs)   9) PAP smear: not due  10) Delivery plan: continue to discuss route of delivery, breastfeeding, pp contraception, pain management in labor  11) Immunizations: Tdap at 28wks  12) POS increased risk for gestational diabetes, HgA1c done today and plan for screen at 28wks (The ADA and ACOG define patients at increased risk of type 2 diabetes based on: body mass index (BMI) ?25 kg/m2 (?23 kg/m2 in  Americans) plus one or more of the following [2,28]:  GDM in a previous pregnancy.  Glycated hemoglobin ?5.7 percent (39 mmol/mol), impaired glucose tolerance, or impaired fasting glucose on previous testing.  First-degree relative with diabetes.  High-risk race/ethnicity (eg, , , ,  American, ).  History of cardiovascular disease.  Hypertension (?140/90 mmHg) or on therapy for hypertension.  High-density  lipoprotein cholesterol level <35 mg/dL (0.90 mmol/L) and/or a triglyceride level >250 mg/dL (2.82 mmol/L).  Polycystic ovary syndrome (PCOS).  Physical inactivity.  Other clinical condition associated with insulin resistance (eg, severe obesity, acanthosis nigricans).  -In addition, we would include older age and use age ?35 years as the threshold.  13) chronic hypertension.  Records reviewed and several elevated Bps months apart.  High risk for preeclampsia.  Will  start baby ASA between 12-16 weeks and stop between 36-delivery.(The incidence of preeclampsia is estimated to be at least 8 percent for pregnant women with any one of these high risk factors:  ?Previous pregnancy with preeclampsia, especially early onset and with an adverse outcome.  ?Type 1 or 2 diabetes mellitus.  ?Chronic hypertension.  ?Multifetal gestation.  ?Kidney disease.  ?Autoimmune disease with potential vascular complications (antiphospholipid syndrome, systemic lupus erythematosus).     or two or more of the following moderate risk factors [22]:  ?Nulliparity.  ?Obesity (body mass index >30 kg/m2).  ?Family history of preeclampsia in mother or sister.  ?Age ?35 years.  ?Sociodemographic characteristics (Black persons, lower income level [recognizing that these are not biological factors]).  ?Personal risk factors (eg, previous pregnancy with low birth weight or small for gestational age infant, previous adverse pregnancy outcome [eg, stillbirth], interval >10 years between pregnancies).  ?In vitro conception    Return to clinic in 4 weeks or prn.

## 2023-10-27 NOTE — NURSING NOTE
"Initial /83 (BP Location: Right arm, Patient Position: Chair, Cuff Size: Adult Large)   Pulse 92   Temp 97.8  F (36.6  C) (Tympanic)   Resp 18   Ht 1.702 m (5' 7\")   Wt 133 kg (293 lb 3.2 oz)   LMP 09/01/2023   BMI 45.92 kg/m   Estimated body mass index is 45.92 kg/m  as calculated from the following:    Height as of this encounter: 1.702 m (5' 7\").    Weight as of this encounter: 133 kg (293 lb 3.2 oz). .    Jo Mendez, ZOHREH    "

## 2023-10-27 NOTE — TELEPHONE ENCOUNTER
S-(situation): Pt requesting zofran rx    B-(background): First ob appt: 10/27/23   Next appt: 11/24/23    A-(assessment): Pt reports vitamin rx available at pharmacy but there was no rx for zofran - she is requesting a prescription    R-(recommendations): Order in pending status if deemed appropriate    Routed to provider for review and approval of rx    RADHA Macedo  [facility]Gyn Clinic

## 2023-10-28 LAB — BACTERIA UR CULT: NORMAL

## 2023-10-30 ENCOUNTER — MYC REFILL (OUTPATIENT)
Dept: FAMILY MEDICINE | Facility: CLINIC | Age: 23
End: 2023-10-30
Payer: COMMERCIAL

## 2023-10-30 DIAGNOSIS — E03.9 ACQUIRED HYPOTHYROIDISM: ICD-10-CM

## 2023-10-30 RX ORDER — LEVOTHYROXINE SODIUM 50 UG/1
50 TABLET ORAL DAILY
Qty: 60 TABLET | Refills: 0 | Status: SHIPPED | OUTPATIENT
Start: 2023-10-30 | End: 2023-12-01

## 2023-10-30 RX ORDER — LEVOTHYROXINE SODIUM 25 UG/1
50 TABLET ORAL DAILY
Qty: 30 TABLET | Refills: 0 | Status: CANCELLED | OUTPATIENT
Start: 2023-10-30

## 2023-10-30 RX ORDER — ONDANSETRON 4 MG/1
4 TABLET, ORALLY DISINTEGRATING ORAL EVERY 8 HOURS PRN
Qty: 30 TABLET | Refills: 1 | Status: SHIPPED | OUTPATIENT
Start: 2023-10-30 | End: 2024-03-08

## 2023-10-30 NOTE — TELEPHONE ENCOUNTER
"Last Written Prescription Date:  10/17/23  Last Fill Quantity: 30,  # refills: 0   Last office visit: 10/27/23 with Dr. Gupta   Future Office Visit:   Next 5 appointments (look out 90 days)      Nov 15, 2023 11:00 AM  Nurse Only with [facility]  [facility] ([facility] ) [address] [address]  [phone]     Nov 24, 2023 11:30 AM  Return OB Visit with Cortney Fonseca MD  [facility] ([facility] ) [address] [address]  [phone]             Requested Prescriptions   Pending Prescriptions Disp Refills    levothyroxine (SYNTHROID/LEVOTHROID) 25 MCG tablet 30 tablet 0     Sig: Take 2 tablets (50 mcg) by mouth daily       Thyroid Protocol Failed - 10/30/2023 10:42 AM        Failed - No active pregnancy on record     If patient is pregnant or has had a positive pregnancy test, please check TSH.          Passed - Patient is 12 years or older        Passed - Recent (12 mo) or future (30 days) visit within the authorizing provider's specialty     Patient has had an office visit with the authorizing provider or a provider within the authorizing providers department within the previous 12 mos or has a future within next 30 days. See \"Patient Info\" tab in inbasket, or \"Choose Columns\" in Meds & Orders section of the refill encounter.              Passed - Medication is active on med list        Passed - Normal TSH on file in past 12 months     Recent Labs   Lab Test 10/27/23  0918   TSH 2.94              Passed - No positive pregnancy test in past 12 months     If patient is pregnant or has had a positive pregnancy test, please check TSH.             Routing refill request to provider for review/approval because:  Can pt have more than 30 tabs or refills moving forward? Rx was for 30 tabs/no refills d/t pt needing to be seen and TSH tested. " TSH on 10/27/23 2.94    Thank you,    Hellen, RN  Ob/Gyn Clinic

## 2023-10-30 NOTE — TELEPHONE ENCOUNTER
Marian, Cortney Edwards MD   to [facility] Ob Triage Pool       10/30/23 11:41 AM  Can you confirm her dose for me? She has 25 mcg tablets but it says 50 mcg. If she is taking 50 mcg I will just switch the tablet strength so she doesn't need to take two at a time    Cortney Fonseca MD          Pt reports she is taking 50mcg per day. Rx changed to reflect this     Routing to MD team for review and signature - please review quantity and refills as well    RADHA Macedo  Ob/Gyn Clinic

## 2023-10-31 ENCOUNTER — TELEPHONE (OUTPATIENT)
Dept: OBGYN | Facility: CLINIC | Age: 23
End: 2023-10-31
Payer: COMMERCIAL

## 2023-10-31 NOTE — TELEPHONE ENCOUNTER
Central Prior Authorization Team   Phone: [phone]    PA Initiation    Medication: CYANOCOBALAMIN 1000 MCG SL SUBL  Insurance Company: [employer] - Phone [phone] Fax [phone]  Pharmacy Filling the Rx: [id number] [place] [id number] [facility] [address]  Filling Pharmacy Phone: [phone]  Filling Pharmacy Fax:    Start Date: 10/31/2023

## 2023-10-31 NOTE — TELEPHONE ENCOUNTER
Tejal Ordonez   Medical Assistant     Telephone Encounter  Signed     Creation Time: 10/27/2023 12:18 PM     Prior Authorization Retail Medication Request     Medication/Dose: B-12 100MCG and Vitamin C 250 MG  ICD code (if different than what is on RX):    Anemia, unspecified type [D64.9]  - Primary         Previously Tried and Failed:    Rationale:       Insurance Name:  Covermy meds   B-12 KEY [id number]                                                   Vitamin C KEY [id number]  Insurance ID:          Pharmacy Information (if different than what is on RX)  Name:  [facility]  Phone:  [phone]

## 2023-10-31 NOTE — TELEPHONE ENCOUNTER
Central Prior Authorization Team   Phone: [phone]    PA Initiation    Medication: VITAMIN C 250 MG PO CHEW  Insurance Company: [employer] - Phone [phone] Fax [phone]  Pharmacy Filling the Rx: [id number] - [facility] [address]  Filling Pharmacy Phone: [phone]  Filling Pharmacy Fax:    Start Date: 10/31/2023

## 2023-11-01 NOTE — TELEPHONE ENCOUNTER
Prior Authorization Not Needed per Insurance    Medication: VITAMIN C 250 MG PO CHEW  Insurance Company: [employer] - Phone [phone] Fax [phone]  Expected CoPay: $    Pharmacy Filling the Rx: [id number] - [place] [id number] [facility] [address]  Pharmacy Notified: Yes  Patient Notified: Yes (pharmacy will notify the patient when ready)

## 2023-11-01 NOTE — TELEPHONE ENCOUNTER
PRIOR AUTHORIZATION DENIED    Medication: CYANOCOBALAMIN 1000 MCG SL SUBL  Insurance Company: [employer] - Phone [phone] Fax [phone]  Denial Date: 11/1/2023  Denial Rational:  DOES NOT PARTICIPATE IN THE MDRP - PRODUCT IS EXCLUDED      Appeal Information: IF PROVIDER WOULD LIKE TO APPEAL THIS DECISION PLEASE PROVIDE THE PA TEAM WITH A LETTER OF MEDICAL NECESSITY      Patient Notified: No

## 2023-11-02 NOTE — TELEPHONE ENCOUNTER
Unable to reach pt by phone and VM is full so [employer] message sent.    -Tejal Ordonez  Clinic Station

## 2023-11-02 NOTE — TELEPHONE ENCOUNTER
Will send to Nadege Floyd MD to advise on PA denial for Cyanocobalamin 1000 MCG.    -Tejal Ordonez  Clinic Station

## 2023-11-02 NOTE — TELEPHONE ENCOUNTER
Nadege Floyd MD Heaton, Pamela F  Caller: Unspecified (6 days ago, 12:16 PM)  She can get this over the counter, or eat foods high in Vitamin B12, such as fish, meat, poultry, eggs and dairy products.  Some cereals are fortified with vitamin B12.    Nadege Floyd MD on 11/2/2023 at 12:50 PM

## 2023-11-15 ENCOUNTER — ALLIED HEALTH/NURSE VISIT (OUTPATIENT)
Dept: OBGYN | Facility: CLINIC | Age: 23
End: 2023-11-15
Payer: COMMERCIAL

## 2023-11-15 DIAGNOSIS — Z34.80 PRENATAL CARE OF MULTIGRAVIDA, ANTEPARTUM: Primary | ICD-10-CM

## 2023-11-15 PROCEDURE — 36415 COLL VENOUS BLD VENIPUNCTURE: CPT

## 2023-11-15 PROCEDURE — 99207 PR NO CHARGE NURSE ONLY: CPT

## 2023-11-20 LAB — SCANNED LAB RESULT: NORMAL

## 2023-11-24 ENCOUNTER — TRANSCRIBE ORDERS (OUTPATIENT)
Dept: MATERNAL FETAL MEDICINE | Facility: HOSPITAL | Age: 23
End: 2023-11-24

## 2023-11-24 ENCOUNTER — PRENATAL OFFICE VISIT (OUTPATIENT)
Dept: OBGYN | Facility: CLINIC | Age: 23
End: 2023-11-24
Payer: COMMERCIAL

## 2023-11-24 VITALS
DIASTOLIC BLOOD PRESSURE: 76 MMHG | SYSTOLIC BLOOD PRESSURE: 109 MMHG | WEIGHT: 279 LBS | HEART RATE: 97 BPM | BODY MASS INDEX: 43.7 KG/M2 | TEMPERATURE: 98.4 F

## 2023-11-24 DIAGNOSIS — Z34.91 PRENATAL CARE, FIRST TRIMESTER: Primary | ICD-10-CM

## 2023-11-24 DIAGNOSIS — I10 CHRONIC HYPERTENSION: ICD-10-CM

## 2023-11-24 DIAGNOSIS — O26.90 PREGNANCY RELATED CONDITION, ANTEPARTUM: Primary | ICD-10-CM

## 2023-11-24 PROCEDURE — 99207 PR PRENATAL VISIT: CPT | Performed by: OBSTETRICS & GYNECOLOGY

## 2023-11-24 RX ORDER — ASPIRIN 81 MG/1
81 TABLET, CHEWABLE ORAL DAILY
Qty: 90 TABLET | Refills: 3 | Status: ON HOLD | OUTPATIENT
Start: 2023-11-24 | End: 2024-06-03

## 2023-11-24 NOTE — PROGRESS NOTES
"[facility]   OB/GYN Clinic    CC: Return OB     Subjective:    Samantha is a 22 year old  at 12w0d awho presents for return OB visit. She reports feeling well. Denies uterine cramping, vaginal bleeding or leaking, dysuria.     Objective:  /76 (BP Location: Left arm, Patient Position: Chair, Cuff Size: Adult Large)   Pulse 97   Temp 98.4  F (36.9  C) (Tympanic)   Wt 126.6 kg (279 lb)   LMP 2023   BMI 43.70 kg/m      Estimated body mass index is 43.7 kg/m  as calculated from the following:    Height as of 10/27/23: 1.702 m (5' 7\").    Weight as of this encounter: 126.6 kg (279 lb).    Physical Exam:  Gen: Pleasant, talkative female in no apparent distress   Respiratory: breathing comfortably on room air   Cardiac: Warm and well-perfused.   GI: Abd soft and non-tender, gravid  MSK: Grossly normal movement of all four extremities  Psych: mood and affect bright   Lower extremity: edema not present     Fetal dop tones: 150s bpm      Assessment/Plan:   22 year old  at 12w0d  who presents for follow-up OB visit.   1) New OB lab; T&S, CBC, HIV, RPR, HepBsAg, Hep B antibody, rubella, GC/Chlam WNL. Plan for 3rd tri lab at 28wks.  2) Genetics testing: NIPT WNL  3) anatomy scan at 18w  4) Concerns:   cHTN- BP normal today, rec ASA. Plan Serial growth US, level 2  Hypothyroidism- TSH at new OB WNL  5) Medication review: no changes, continue prenatal vitamin     Return to clinic in 4 weeks.     Cortney Fonseca MD   2023 1:29 PM     "

## 2023-11-24 NOTE — NURSING NOTE
"Initial /76 (BP Location: Left arm, Patient Position: Chair, Cuff Size: Adult Large)   Pulse 97   Temp 98.4  F (36.9  C) (Tympanic)   Wt 126.6 kg (279 lb)   LMP 09/01/2023   BMI 43.70 kg/m   Estimated body mass index is 43.7 kg/m  as calculated from the following:    Height as of 10/27/23: 1.702 m (5' 7\").    Weight as of this encounter: 126.6 kg (279 lb). .      Naima Gastelum MA    "

## 2023-12-01 ENCOUNTER — MYC REFILL (OUTPATIENT)
Dept: FAMILY MEDICINE | Facility: CLINIC | Age: 23
End: 2023-12-01
Payer: COMMERCIAL

## 2023-12-01 DIAGNOSIS — E03.9 ACQUIRED HYPOTHYROIDISM: ICD-10-CM

## 2023-12-04 ENCOUNTER — MYC MEDICAL ADVICE (OUTPATIENT)
Dept: OBGYN | Facility: CLINIC | Age: 23
End: 2023-12-04
Payer: COMMERCIAL

## 2023-12-04 DIAGNOSIS — E03.9 ACQUIRED HYPOTHYROIDISM: ICD-10-CM

## 2023-12-04 RX ORDER — LEVOTHYROXINE SODIUM 50 UG/1
50 TABLET ORAL DAILY
Qty: 60 TABLET | Refills: 0 | Status: SHIPPED | OUTPATIENT
Start: 2023-12-04 | End: 2023-12-04

## 2023-12-04 RX ORDER — LEVOTHYROXINE SODIUM 50 UG/1
50 TABLET ORAL DAILY
Qty: 60 TABLET | Refills: 0 | Status: SHIPPED | OUTPATIENT
Start: 2023-12-04 | End: 2024-01-02

## 2023-12-04 NOTE — TELEPHONE ENCOUNTER
"Last Written Prescription Date:  10/30/23  Last Fill Quantity: 60,  # refills: 0   Last office visit: 11/24/23 with Dr. Fonseca  Future Office Visit:  12/22/23 with midwife at different location        Requested Prescriptions   Pending Prescriptions Disp Refills    levothyroxine (SYNTHROID/LEVOTHROID) 50 MCG tablet 60 tablet 1     Sig: Take 1 tablet (50 mcg) by mouth daily       Thyroid Protocol Failed - 12/1/2023  1:48 PM        Failed - No active pregnancy on record     If patient is pregnant or has had a positive pregnancy test, please check TSH.          Passed - Patient is 12 years or older        Passed - Recent (12 mo) or future (30 days) visit within the authorizing provider's specialty     Patient has had an office visit with the authorizing provider or a provider within the authorizing providers department within the previous 12 mos or has a future within next 30 days. See \"Patient Info\" tab in inbasket, or \"Choose Columns\" in Meds & Orders section of the refill encounter.              Passed - Medication is active on med list        Passed - Normal TSH on file in past 12 months     Recent Labs   Lab Test 10/27/23  0918   TSH 2.94              Passed - No positive pregnancy test in past 12 months     If patient is pregnant or has had a positive pregnancy test, please check TSH.             Routing refill request to provider for review/approval because:  Pt is out of medication and needing refill - pt will be seeing midwife at alternate location on 12/22/23   Can rx be approved to cover pt until appt please?  Pt was informed that rx will need to be changed to new provider.    Thanks!    Hellen RN  Ob/Gyn Clinic        "

## 2023-12-04 NOTE — TELEPHONE ENCOUNTER
Pt reports [facility] won't refill medication until December 14th - pt requesting rx be sent to [facility] pharmacy    Pharmacy changed to [facility] - no other attributes of prescription changed     Hellen RN  Ob/Gyn Clinic

## 2023-12-05 RX ORDER — LEVOTHYROXINE SODIUM 25 UG/1
25 TABLET ORAL DAILY
Qty: 30 TABLET | Refills: 1 | Status: SHIPPED | OUTPATIENT
Start: 2023-12-05 | End: 2024-05-10 | Stop reason: DRUGHIGH

## 2023-12-05 NOTE — TELEPHONE ENCOUNTER
S-(situation): Pt accidentally took increased synthroid dose    B-(background): ; 13+4; LAst appt: 23 with Dr. Fonseca   Next appt: 23 with a midwife at another location - pt changing clinics    A-(assessment): Pt had requested synthroid refill yesterday but [facility] pharmacy denied refill until 23  Pt requested rx be sent to [facility]  Pt then realized that [facility] was giving her 50mcg tabs instead of the usual 25mcg tabs and she had been taking 2 tabs each day rather than one - pt has been taking 100mcg rather than prescribed 50mcg  Pt is requesting thyroid labs today and is wondering what to do about her medication since the pharmacy will not refill until  and she is out    R-(recommendations): Routing to provider team to review and advise - would you like pt to have TSH and T4 drawn today?     Thank you,     Hellen RN  Ob/Gyn Clinic

## 2023-12-05 NOTE — TELEPHONE ENCOUNTER
UPDATE:   Dr. Gupta provided recommendations to stop levothyroxine for one week and continue taking at normal dose after one week of no medication.     Pt is unable to refill medication d/t insurance   Call made to pharmacy - pharmacist suggested new rx for 25mcg and for pt to take 2 tabs as insurance will likely cover this    Pt does have an appointment with a midwife at another clinic on 12/22/23 and was told to discuss prescription with her at that appointment as well. Will need enough medication to get her to that appointment    Routing to provider team for review and approval  Rx in pending status - needing directions and quantity    Thank you!!    RADHA Macedo  Ob/Gyn Clinic

## 2023-12-16 DIAGNOSIS — E03.9 ACQUIRED HYPOTHYROIDISM: ICD-10-CM

## 2023-12-16 RX ORDER — LEVOTHYROXINE SODIUM 50 UG/1
50 TABLET ORAL DAILY
Qty: 60 TABLET | Refills: 0 | Status: CANCELLED | OUTPATIENT
Start: 2023-12-16

## 2023-12-20 NOTE — TELEPHONE ENCOUNTER
12/4/2023 prescription for 50 mcg , 60# Levothyroxine sent to [facility] pharmacy. Prescription was discontinued from the [facility] pharmacy. Secomd prescription sent on 12/5 for 25mcg tablet with directions to take 2 tablets daily. Patient should not need immediate refill.Patient is transferring care to midwife team and will need to follow up for refills with that care team.    Chantale GARCIA   Ob/Gyn Clinic

## 2023-12-22 ENCOUNTER — PRENATAL OFFICE VISIT (OUTPATIENT)
Dept: MIDWIFE SERVICES | Facility: CLINIC | Age: 23
End: 2023-12-22
Payer: COMMERCIAL

## 2023-12-22 ENCOUNTER — HOSPITAL ENCOUNTER (OUTPATIENT)
Dept: ULTRASOUND IMAGING | Facility: HOSPITAL | Age: 23
Discharge: HOME | End: 2023-12-22
Attending: ADVANCED PRACTICE MIDWIFE | Admitting: ADVANCED PRACTICE MIDWIFE
Payer: COMMERCIAL

## 2023-12-22 VITALS — WEIGHT: 274 LBS | SYSTOLIC BLOOD PRESSURE: 122 MMHG | BODY MASS INDEX: 42.91 KG/M2 | DIASTOLIC BLOOD PRESSURE: 72 MMHG

## 2023-12-22 DIAGNOSIS — F17.200 VAPING NICOTINE DEPENDENCE, NON-TOBACCO PRODUCT: ICD-10-CM

## 2023-12-22 DIAGNOSIS — E03.9 ACQUIRED HYPOTHYROIDISM: ICD-10-CM

## 2023-12-22 DIAGNOSIS — O09.92 SUPERVISION OF HIGH RISK PREGNANCY IN SECOND TRIMESTER: Primary | ICD-10-CM

## 2023-12-22 DIAGNOSIS — Z34.82 PRENATAL CARE, SUBSEQUENT PREGNANCY IN SECOND TRIMESTER: ICD-10-CM

## 2023-12-22 DIAGNOSIS — Z91.89 AT RISK FOR COMPLICATION OF PREGNANCY: ICD-10-CM

## 2023-12-22 DIAGNOSIS — E66.01 MORBID OBESITY (H): ICD-10-CM

## 2023-12-22 DIAGNOSIS — Z13.79 GENETIC SCREENING: ICD-10-CM

## 2023-12-22 DIAGNOSIS — Z87.448 HISTORY OF PYELONEPHRITIS: ICD-10-CM

## 2023-12-22 DIAGNOSIS — O09.899 SHORT INTERVAL BETWEEN PREGNANCIES AFFECTING PREGNANCY, ANTEPARTUM: ICD-10-CM

## 2023-12-22 PROBLEM — O10.919 CHRONIC HYPERTENSION IN PREGNANCY: Status: RESOLVED | Noted: 2023-04-06 | Resolved: 2023-12-22

## 2023-12-22 PROBLEM — N10 PYELONEPHRITIS, ACUTE: Status: RESOLVED | Noted: 2023-04-12 | Resolved: 2023-12-22

## 2023-12-22 PROBLEM — O10.919 CHRONIC HYPERTENSION AFFECTING PREGNANCY: Status: RESOLVED | Noted: 2023-04-07 | Resolved: 2023-12-22

## 2023-12-22 PROBLEM — A41.9 SEPSIS, DUE TO UNSPECIFIED ORGANISM, UNSPECIFIED WHETHER ACUTE ORGAN DYSFUNCTION PRESENT (H): Status: RESOLVED | Noted: 2023-04-12 | Resolved: 2023-12-22

## 2023-12-22 PROCEDURE — 99207 PR PRENATAL VISIT: CPT | Performed by: ADVANCED PRACTICE MIDWIFE

## 2023-12-22 PROCEDURE — 76815 OB US LIMITED FETUS(S): CPT

## 2023-12-24 PROBLEM — Z13.79 GENETIC SCREENING: Status: ACTIVE | Noted: 2023-12-24

## 2023-12-24 PROBLEM — F17.200 VAPING NICOTINE DEPENDENCE, NON-TOBACCO PRODUCT: Status: ACTIVE | Noted: 2023-12-24

## 2023-12-24 PROBLEM — Z91.89 AT RISK FOR COMPLICATION OF PREGNANCY: Status: ACTIVE | Noted: 2023-12-24

## 2023-12-24 NOTE — PROGRESS NOTES
Addendum: Patient left before going to the lab for her blood draw (TSH, free T4 and single marker AFP was ordered).  Please obtain when RTC.    Samantha presents to the clinic with her mother and her daughter.  Patient desires internal transfer of care to [facility].  She states she is looking for a different experience than the one she had in [place], [place] with her first daughter.  Her primary concern was that she was told to wait to push in second stage because the doctor attending birth was occupied.  She waited 2 hours.  In the early postpartum on day 3, she developed a high fever and pyelonephritis status post indwelling catheter with epidural anesthesia for labor and birth.  Feeling well in general.  She denies quickening, lower abdominal pain, vaginal bleeding or loss of fluid.  [facility] L2 OB US already scheduled on 1/11/2024.  Patient underwent noninvasive prenatal testing and agrees to single marker AFP today. Next visit: Please obtain urine culture (plan urine culture q. trimester due to history of pyelonephritis).  Admits to vaping occasionally, but trying to quit.  Encouraged cessation.  Patient with Short pregnancy interval; she has iron deficiency anemia.  Strongly encouraged to consume iron rich foods as well as vigilance taking oral iron/vitamin C/vitamin B-12 every other day as directed.  Elevated pregravid BMI of 43 with no weight gain thus far (-4 pound total weight gain).  We will follow recommendations of [facility] for antepartum testing and offering IOL at 39+0 weeks.  She does take ASA 81 mg p.o. daily.  Acquired hypothyroidism -she takes levothyroxine 50 mcg p.o. daily.  6 weeks ago, there was a mixup with her levothyroxine prescription, and she accidentally took 100 mcg p.o. daily for 4 weeks.  They told her to discontinue the levothyroxine x 1 week, then restart back at the 50 mcg dosage which she has been on for 2 weeks.  TSH and free T4 obtained today.  Fetal heart tones not  auscultated during prenatal visit, and patient is accepting of an ultrasound to verify viability today.  Second trimester teaching completed.  All questions answered.  Danger signs and symptoms reviewed.  Encouraged to call or RTC with any questions, concerns, or as needed.  Invited for return OB appointment in 4 weeks.

## 2024-01-02 DIAGNOSIS — E03.9 ACQUIRED HYPOTHYROIDISM: ICD-10-CM

## 2024-01-02 RX ORDER — LEVOTHYROXINE SODIUM 50 UG/1
50 TABLET ORAL DAILY
Qty: 90 TABLET | Refills: 0 | Status: SHIPPED | OUTPATIENT
Start: 2024-01-02 | End: 2024-03-28

## 2024-01-08 ENCOUNTER — PRE VISIT (OUTPATIENT)
Dept: MATERNAL FETAL MEDICINE | Facility: HOSPITAL | Age: 24
End: 2024-01-08
Payer: COMMERCIAL

## 2024-01-11 ENCOUNTER — ANCILLARY PROCEDURE (OUTPATIENT)
Dept: ULTRASOUND IMAGING | Facility: HOSPITAL | Age: 24
End: 2024-01-11
Attending: OBSTETRICS & GYNECOLOGY
Payer: COMMERCIAL

## 2024-01-11 ENCOUNTER — OFFICE VISIT (OUTPATIENT)
Dept: MATERNAL FETAL MEDICINE | Facility: HOSPITAL | Age: 24
End: 2024-01-11
Attending: OBSTETRICS & GYNECOLOGY
Payer: COMMERCIAL

## 2024-01-11 DIAGNOSIS — O99.212 OBESITY AFFECTING PREGNANCY IN SECOND TRIMESTER, UNSPECIFIED OBESITY TYPE: ICD-10-CM

## 2024-01-11 DIAGNOSIS — Z36.2 ENCOUNTER FOR FOLLOW-UP ULTRASOUND OF FETAL ANATOMY: Primary | ICD-10-CM

## 2024-01-11 DIAGNOSIS — O26.90 PREGNANCY RELATED CONDITION, ANTEPARTUM: ICD-10-CM

## 2024-01-11 PROCEDURE — 76811 OB US DETAILED SNGL FETUS: CPT

## 2024-01-11 PROCEDURE — 76811 OB US DETAILED SNGL FETUS: CPT | Mod: 26 | Performed by: OBSTETRICS & GYNECOLOGY

## 2024-01-11 PROCEDURE — 99207 PR NO CHARGE LOS: CPT | Performed by: OBSTETRICS & GYNECOLOGY

## 2024-01-25 ENCOUNTER — PRENATAL OFFICE VISIT (OUTPATIENT)
Dept: MIDWIFE SERVICES | Facility: CLINIC | Age: 24
End: 2024-01-25
Payer: COMMERCIAL

## 2024-01-25 VITALS — WEIGHT: 277 LBS | SYSTOLIC BLOOD PRESSURE: 106 MMHG | BODY MASS INDEX: 43.38 KG/M2 | DIASTOLIC BLOOD PRESSURE: 72 MMHG

## 2024-01-25 DIAGNOSIS — E03.9 ACQUIRED HYPOTHYROIDISM: ICD-10-CM

## 2024-01-25 DIAGNOSIS — Z91.89 AT RISK FOR COMPLICATION OF PREGNANCY: ICD-10-CM

## 2024-01-25 DIAGNOSIS — Z87.448 HISTORY OF PYELONEPHRITIS: ICD-10-CM

## 2024-01-25 DIAGNOSIS — O09.92 SUPERVISION OF HIGH RISK PREGNANCY IN SECOND TRIMESTER: Primary | ICD-10-CM

## 2024-01-25 DIAGNOSIS — O09.899 SHORT INTERVAL BETWEEN PREGNANCIES AFFECTING PREGNANCY, ANTEPARTUM: ICD-10-CM

## 2024-01-25 DIAGNOSIS — Z13.79 GENETIC SCREENING: ICD-10-CM

## 2024-01-25 DIAGNOSIS — E66.01 MORBID OBESITY (H): ICD-10-CM

## 2024-01-25 PROCEDURE — 84443 ASSAY THYROID STIM HORMONE: CPT | Performed by: ADVANCED PRACTICE MIDWIFE

## 2024-01-25 PROCEDURE — 84439 ASSAY OF FREE THYROXINE: CPT | Performed by: ADVANCED PRACTICE MIDWIFE

## 2024-01-25 PROCEDURE — 36415 COLL VENOUS BLD VENIPUNCTURE: CPT | Performed by: ADVANCED PRACTICE MIDWIFE

## 2024-01-25 PROCEDURE — 99207 PR PRENATAL VISIT: CPT | Performed by: ADVANCED PRACTICE MIDWIFE

## 2024-01-25 PROCEDURE — 99000 SPECIMEN HANDLING OFFICE-LAB: CPT | Performed by: ADVANCED PRACTICE MIDWIFE

## 2024-01-25 PROCEDURE — 87086 URINE CULTURE/COLONY COUNT: CPT | Performed by: ADVANCED PRACTICE MIDWIFE

## 2024-01-25 PROCEDURE — 82105 ALPHA-FETOPROTEIN SERUM: CPT | Mod: 90 | Performed by: ADVANCED PRACTICE MIDWIFE

## 2024-01-25 NOTE — PROGRESS NOTES
Message received from the women's imaging center at [place] [address].   States patient is scheduled on 2/2/21 for a Diagnostic mammogram and they need an order for a bilateral US Samantha presents to the clinic with her mother and infant daughter.  All is well!  She reports quickening and denies vaginal bleeding, loss of fluid or abdominal pain.  [facility] L2 OB US performed on 1/11/2024, WNL although fetal heart not well-visualized so MFM follow-up scheduled on 2/1/2024.  Patient is agreeable to increased fetal surveillance due to short pregnancy interval and pregravid BMI of 43.  Total weight gain thus far: -1 pound.  Patient accepting of single marker AFP today along with TSH and free T4.  Urine culture obtained as a screening due to for history of pyelonephritis.  Second trimester teaching completed.  Danger signs and symptoms reviewed.  All questions answered.  Encouraged to call or RTC with any questions, concerns, or as needed.

## 2024-01-26 LAB
BACTERIA UR CULT: NORMAL
T4 FREE SERPL-MCNC: 1.06 NG/DL (ref 0.9–1.7)
TSH SERPL DL<=0.005 MIU/L-ACNC: 1.77 UIU/ML (ref 0.3–4.2)

## 2024-01-28 LAB
# FETUSES US: NORMAL
AFP MOM SERPL: 0.96
AFP SERPL-MCNC: 43 NG/ML
AGE - REPORTED: 23.5 YR
CURRENT SMOKER: NO
FAMILY MEMBER DISEASES HX: NO
GA METHOD: NORMAL
GA: NORMAL WK
IDDM PATIENT QL: NO
INTEGRATED SCN PATIENT-IMP: NORMAL
SPECIMEN DRAWN SERPL: NORMAL

## 2024-02-01 ENCOUNTER — ANCILLARY PROCEDURE (OUTPATIENT)
Dept: ULTRASOUND IMAGING | Facility: HOSPITAL | Age: 24
End: 2024-02-01
Attending: OBSTETRICS & GYNECOLOGY
Payer: COMMERCIAL

## 2024-02-01 ENCOUNTER — OFFICE VISIT (OUTPATIENT)
Dept: MATERNAL FETAL MEDICINE | Facility: HOSPITAL | Age: 24
End: 2024-02-01
Attending: OBSTETRICS & GYNECOLOGY
Payer: COMMERCIAL

## 2024-02-01 DIAGNOSIS — Z36.2 ENCOUNTER FOR FOLLOW-UP ULTRASOUND OF FETAL ANATOMY: ICD-10-CM

## 2024-02-01 DIAGNOSIS — Z36.89 ENCOUNTER FOR ULTRASOUND TO CHECK FETAL GROWTH: Primary | ICD-10-CM

## 2024-02-01 DIAGNOSIS — O99.212 OBESITY AFFECTING PREGNANCY IN SECOND TRIMESTER, UNSPECIFIED OBESITY TYPE: ICD-10-CM

## 2024-02-01 PROCEDURE — 99207 PR NO CHARGE LOS: CPT | Performed by: OBSTETRICS & GYNECOLOGY

## 2024-02-01 PROCEDURE — 76816 OB US FOLLOW-UP PER FETUS: CPT

## 2024-02-01 PROCEDURE — 76816 OB US FOLLOW-UP PER FETUS: CPT | Mod: 26 | Performed by: OBSTETRICS & GYNECOLOGY

## 2024-02-01 NOTE — NURSING NOTE
Samantha Cody is a  at 21w6d who presents to [facility] for a follow-up ultrasound for sub-optimal anatomy. Pt reports positive fetal movement. Pt denies bldg/lof/change in discharge, contractions, headache, vision changes, chest pain/SOB or edema. SBAR given to Dr. Mcgee, see note in Epic.      Khadijah Blank RN

## 2024-02-22 ENCOUNTER — PRENATAL OFFICE VISIT (OUTPATIENT)
Dept: MIDWIFE SERVICES | Facility: CLINIC | Age: 24
End: 2024-02-22
Payer: COMMERCIAL

## 2024-02-22 VITALS
WEIGHT: 274 LBS | DIASTOLIC BLOOD PRESSURE: 70 MMHG | HEART RATE: 82 BPM | SYSTOLIC BLOOD PRESSURE: 108 MMHG | BODY MASS INDEX: 42.91 KG/M2

## 2024-02-22 DIAGNOSIS — E03.9 ACQUIRED HYPOTHYROIDISM: ICD-10-CM

## 2024-02-22 DIAGNOSIS — O09.899 SHORT INTERVAL BETWEEN PREGNANCIES AFFECTING PREGNANCY, ANTEPARTUM: ICD-10-CM

## 2024-02-22 DIAGNOSIS — O09.529 SUPERVISION OF HIGH-RISK PREGNANCY OF ELDERLY MULTIGRAVIDA: Primary | ICD-10-CM

## 2024-02-22 DIAGNOSIS — O09.92 SUPERVISION OF HIGH RISK PREGNANCY IN SECOND TRIMESTER: ICD-10-CM

## 2024-02-22 DIAGNOSIS — E66.01 MORBID OBESITY (H): ICD-10-CM

## 2024-02-22 DIAGNOSIS — M25.50 MULTIPLE JOINT PAIN: ICD-10-CM

## 2024-02-22 LAB
GLUCOSE 1H P 50 G GLC PO SERPL-MCNC: 88 MG/DL (ref 70–129)
HGB BLD-MCNC: 10 G/DL (ref 11.7–15.7)
HOLD SPECIMEN: NORMAL

## 2024-02-22 PROCEDURE — 36415 COLL VENOUS BLD VENIPUNCTURE: CPT | Performed by: ADVANCED PRACTICE MIDWIFE

## 2024-02-22 PROCEDURE — 99207 PR PRENATAL VISIT: CPT | Performed by: ADVANCED PRACTICE MIDWIFE

## 2024-02-22 PROCEDURE — 85018 HEMOGLOBIN: CPT | Performed by: ADVANCED PRACTICE MIDWIFE

## 2024-02-22 PROCEDURE — 84443 ASSAY THYROID STIM HORMONE: CPT | Performed by: ADVANCED PRACTICE MIDWIFE

## 2024-02-22 PROCEDURE — 82950 GLUCOSE TEST: CPT | Performed by: ADVANCED PRACTICE MIDWIFE

## 2024-02-22 PROCEDURE — 84439 ASSAY OF FREE THYROXINE: CPT | Performed by: ADVANCED PRACTICE MIDWIFE

## 2024-02-22 NOTE — PROGRESS NOTES
Samantha presents to [facility] clinic for a routine prenatal visit at 24w6d. Feeling well, body aches and pains--lower back and pelvic aches, not Uc's or tightenings. Discussed relaxin hormone. Uncomfortable to turn over at night but can sleep.  Here for her 1 hr. GCT, hgb, TSH and free t4  today.   Reports normal fetal movements. Discussed [facility]. Denies PTL including, regular painful contractions, bleeding, leaking or changes in fetal movement.   Reviewed  labor precautions, warning signs and when/how to call the on-call CNM.  Anatomy ultrasound reviewed (M normal on 24).  Offers no questions or concerns.     Reviewed  labor precautions, warning signs and when/how to call the on-call CNM.   Reviewed recommendation for Tdap for fetal protection of pertussis, given after 27w0d.     [place] accepts Tdap for fetal protection of pertussis 27-36 wks.   [place]: 4 wks if labs are normal

## 2024-02-23 LAB
T4 FREE SERPL-MCNC: 1.06 NG/DL (ref 0.9–1.7)
TSH SERPL DL<=0.005 MIU/L-ACNC: 1.9 UIU/ML (ref 0.3–4.2)

## 2024-03-07 ENCOUNTER — MYC REFILL (OUTPATIENT)
Dept: OBGYN | Facility: CLINIC | Age: 24
End: 2024-03-07
Payer: COMMERCIAL

## 2024-03-07 DIAGNOSIS — R11.0 NAUSEA: ICD-10-CM

## 2024-03-08 ENCOUNTER — MYC MEDICAL ADVICE (OUTPATIENT)
Dept: MIDWIFE SERVICES | Facility: CLINIC | Age: 24
End: 2024-03-08
Payer: COMMERCIAL

## 2024-03-08 DIAGNOSIS — R11.0 NAUSEA: ICD-10-CM

## 2024-03-08 RX ORDER — ONDANSETRON 4 MG/1
4 TABLET, ORALLY DISINTEGRATING ORAL EVERY 8 HOURS PRN
Qty: 30 TABLET | Refills: 1 | Status: SHIPPED | OUTPATIENT
Start: 2024-03-08 | End: 2024-04-23

## 2024-03-08 RX ORDER — ONDANSETRON 4 MG/1
4 TABLET, ORALLY DISINTEGRATING ORAL EVERY 8 HOURS PRN
Qty: 30 TABLET | Refills: 1 | OUTPATIENT
Start: 2024-03-08

## 2024-03-08 NOTE — TELEPHONE ENCOUNTER
Refill request received from patient.  Medication requested:    Pending Prescriptions:                       Disp   Refills    ondansetron (ZOFRAN ODT) 4 MG ODT tab     30 tab*1            Sig: Take 1 tablet (4 mg) by mouth every 8 hours as           needed for nausea    Medication last prescribed: 10/30/2023  Last visit with [facility] CNM group: 02/22/2024  Upcoming appointment(s): 3/21/2024    Refill request routed to on-call CNM for review.

## 2024-03-08 NOTE — TELEPHONE ENCOUNTER
Pt receiving care with midwife team in [place]. [employer] message sent recommending that rx be transferred to practice where she is currently receiving care    RADHA Macedo  Ob/Gyn Clinic

## 2024-03-12 NOTE — PROGRESS NOTES
12/27/23 0859   Appointment Info   Signing clinician's name / credentials Floyd Pugh, PT, OCS   Visits Used 7   Medical Diagnosis Status post right shoulder arthroscopy with rotator cuff repair, AC joint resection, extensive debridement, mini open subpectoralis biceps transplantation   PT Tx Diagnosis Impaired range of motion, postural dysfunction, weakness,   Progress Note/Certification   Onset of illness/injury or Date of Surgery 08/14/23   Therapy Frequency 2 visits   Predicted Duration 6 weeks   PT Goal 1   Goal Identifier Right shoulder pain   Goal Description Patient will report the ability to perform ADL activities such as combing hair without pain   Rationale to maximize safety and independence with performance of ADLs and functional tasks;to maximize safety and independence within the home;to maximize safety and independence with self cares   Goal Progress Goal met   Target Date 11/22/23   Date Met 12/13/23   PT Goal 2   Goal Identifier Range of motion   Goal Description Patient will demonstrate right shoulder flexion at 140 degrees or greater to allow to reaching to an overhead shelf without compensation or difficulty.   Rationale to maximize safety and independence within the home;to maximize safety and independence with performance of ADLs and functional tasks;to maximize safety and independence within the community   Goal Progress Progressing towards goal.  Patient demonstrates 120 degrees of active shoulder flexion   Target Date 02/07/24   PT Goal 3   Goal Identifier Weakness   Goal Description Patient will demonstrate right shoulder strength at 4/5 grade or greater to allow lifting a 1 pound object to an overhead shelf without difficulty or compensation.   Rationale to maximize safety and independence with performance of ADLs and functional tasks;to maximize safety and independence within the home;to maximize safety and independence within the community   Goal Progress Progressing towards goal.   [facility]           Assessment & Plan        Samantha Cody is a 22 year old female that presented on 4/12/2023 with sepsis likely secondary to pyelonephritis of right kidney.      Sepsis; likely secondary to pyelonephritis due to Ecoli UTI with E coli bacteremia (bloodstream infection) due to this   - Patient presented to ED tachycardic and febrile with a urinary source of infection. UA shows UTI with culture growing E.coli. Continue ceftriaxone IV and discontinue Vancomycin. Will consult with pharmacy for safe oral medication during lactation after discharge. Lactic acid normalized from 2.1 on admission.    - fever and tachycardia AM 4/13 - gave 1L LR and single dose of prn ibuprofen 400  - blood culture final results pending      Acute Kidney Injury  - Increased creatinine 1.21, baseline 0.60 and last checked 7 days ago.  - on NS @ 100/h on admission.  Creatinine improving but heart rate up AM 4/13- gave additional 1L LR bolus, then continue 100/h NS.  - follow.       Mild Anemia  - Recent delivery complicated by acute blood loss likely source of anemia.  - fairly stable so far      Hypothyroidism  - Continue on levothyroxine 50mg     1 week post-partum  - patient is breast feeding            # Hypoalbuminemia: Lowest albumin = 2.7 g/dL at 4/12/2023  6:03 AM, will monitor as appropriate   # Thrombocytopenia: Lowest platelets = 118 in last 2 days, will monitor for bleeding  # Acute Kidney Injury, unspecified: based on a >150% or 0.3 mg/dL increase in last creatinine compared to past 90 day average, will monitor renal function       # Severe Obesity: Estimated body mass index is 43.16 kg/m  as calculated from the following:    Height as of 3/22/23: 1.702 m (5' 7\").    Weight as of this encounter: 125 kg (275 lb 9.2 oz).               DVT Prophylaxis: Low Risk/Ambulatory with no VTE prophylaxis indicated - reassess if unable to discharge 4/14    Cannon Catheter: Not present    Code " "Patient is making gradual progress with current phase of the rehabilitation protocol   Target Date 02/07/24   Subjective Report   Subjective Report Patient reports updated home exercise program is going well.  She has been able to progress repetitions without any difficulty at this time.  Patient reports function of the right shoulder continues to improve daily.  She is able to tolerate daily activities with less difficulty.  She continues to be careful following protocol restrictions for activity is much as possible.  Patient plans to schedule a follow-up with Dr. Jorgensen.   Objective Measures   Objective Measures Objective Measure 1;Objective Measure 2   Objective Measure 1   Objective Measure Right shoulder pain   Details 0-1/10 on average.   Objective Measure 2   Objective Measure Right shoulder ROM   Details AROM Gpntdge=257 degrees    AROM Abduction= 110 degrees.   Treatment Interventions (PT)   Interventions Therapeutic Procedure/Exercise   Therapeutic Procedure/Exercise   Therapeutic Procedures: strength, endurance, ROM, flexibility minutes (15637) 40   Ther Proc 1 - Details Ceiling Punch 8 ounces 2 x 20.   Supine reverse codmans 8 ounces 2 x 20 reps each motion.  Anterior deltoid isometric with moderate pressure x 10, 5\" hold.    Middle deltoid isometric with moderate pressure x 10, 5\" hold.  Table press x10 reps, 3-second hold.  Isometric abduction x10 reps, 5-second hold.  Side-lying ER 2 x 30.  Bearhug 20 reps, 3-second hold.  Reviewed table circles, prayer stretch and seated ER verbally. Reviewed progression of the 4-5-month phase of the rehabilitation protocol.  Reviewed appropriate activity to level during this phase of recovery.   Skilled Intervention Education, HEP, range of motion   Patient Response/Progress Patient verbalized and demonstrated understanding of updated home exercise program.   Education   Learner/Method Patient;Listening;Reading;Demonstration;Pictures/Video;No Barriers to Learning " Status:   Lines: None       Diet  Orders Placed This Encounter      Advance Diet as Tolerated: Regular Diet Adult                        Disposition Plan      Improving but still tenuous with persistent intermittent fevers/tachycardia.   Possible discharge as soon as tomorrow if improves rapidly.                 Tai Ocampo MD, MD  Hospitalist Service  [facility]  Securely message with the Vocera Web Console (learn more here)  Text page via [employer]aging/Directory             Interval History:   Patient feels a bit better today after having chills all night, but had a return of fever this AM despite feeling better and return of some tachycardia.  No flank or back pain.  Still has headache which is at least improved.  No chills this AM.  Overall feels at least better than yesterday.  No clear new or worsening symptoms.                  Review of Systems:    ROS: 10 point ROS neg other than the symptoms noted above in the HPI.           Medications:   Current active medications and PTA medications reviewed, see medication list for details.            Physical Exam:   Vitals were reviewed  Patient Vitals for the past 24 hrs:   BP Temp Temp src Pulse Resp SpO2 Weight   04/13/23 0845 112/49 (!) 102.3  F (39.1  C) Oral (!) 132 16 94 % --   04/13/23 0415 92/44 98.9  F (37.2  C) Oral 104 18 96 % --   04/12/23 2024 125/77 98.3  F (36.8  C) Oral 99 18 100 % --   04/12/23 1743 110/65 98  F (36.7  C) Oral 96 18 100 % 125 kg (275 lb 9.2 oz)   04/12/23 1700 113/71 -- -- 90 -- 99 % --   04/12/23 1630 110/68 -- -- 88 -- 93 % --   04/12/23 1600 114/76 -- -- 87 -- 100 % --   04/12/23 1530 105/64 -- -- 88 -- 99 % --   04/12/23 1500 106/63 -- -- 92 -- 98 % --   04/12/23 1430 101/54 -- -- 74 -- 94 % --   04/12/23 1400 100/50 -- -- 72 -- 94 % --   04/12/23 1330 100/63 -- -- 101 -- 95 % --   04/12/23 1300 95/58 -- -- 78 -- 94 % --   04/12/23 1230 106/60 -- -- 79 -- 95 % --   04/12/23 1200 102/57 -- -- 92 --    Plan   Plan for next session progress exercises per decelerated rotator cuff repair protocol and as symptoms allow.   Total Session Time   Timed Code Treatment Minutes 40   Total Treatment Time (sum of timed and untimed services) 40         DISCHARGE  Reason for Discharge: Patient canceled remaining PT appointments.  Patient was progressing well with independent home program at time of final visit.  Patient indicated she planned to follow-up with Dr. Meza.        Discharge Plan: Patient to continue home program.    Referring Provider:  Cedrick Jorgensen     96 % --   23 1130 97/59 -- -- 79 -- 94 % --   23 1100 96/54 -- -- 80 -- 91 % --   23 1037 104/56 -- -- 82 -- 94 % --       Temperatures:  Current - Temp: (!) 102.3  F (39.1  C); Max - Temp  Av.4  F (37.4  C)  Min: 98  F (36.7  C)  Max: 102.3  F (39.1  C)  Respiration range: Resp  Av.5  Min: 16  Max: 18  Pulse range: Pulse  Av.6  Min: 72  Max: 132  Blood pressure range: Systolic (24hrs), Av , Min:92 , Max:125   ; Diastolic (24hrs), Av, Min:44, Max:77    Pulse oximetry range: SpO2  Av.9 %  Min: 91 %  Max: 100 %  I/O last 3 completed shifts:  In: 276 [IV Piggyback:276]  Out: -   No intake or output data in the 24 hours ending 23 0939  EXAM:  General: awake and alert, NAD, oriented x 3  Head: normocephalic  Neck: unremarkable, no lymphadenopathy   HEENT: oropharynx pink and moist    Heart: Regular rate and rhythm, no murmurs, rubs, or gallops  Lungs: clear to auscultation bilaterally with good air movement throughout  Abdomen: soft, non-tender, no masses or organomegaly  No CVA tenderness today   Extremities: no edema in lower extremities   Skin unremarkable.               Data:     Reviewed data:  Results for orders placed or performed during the hospital encounter of 23 (from the past 24 hour(s))   CBC with platelets   Result Value Ref Range    WBC Count 8.7 4.0 - 11.0 10e3/uL    RBC Count 3.50 (L) 3.80 - 5.20 10e6/uL    Hemoglobin 9.1 (L) 11.7 - 15.7 g/dL    Hematocrit 29.0 (L) 35.0 - 47.0 %    MCV 83 78 - 100 fL    MCH 26.0 (L) 26.5 - 33.0 pg    MCHC 31.4 (L) 31.5 - 36.5 g/dL    RDW 16.8 (H) 10.0 - 15.0 %    Platelet Count 137 (L) 150 - 450 10e3/uL   Comprehensive metabolic panel   Result Value Ref Range    Sodium 142 136 - 145 mmol/L    Potassium 3.8 3.4 - 5.3 mmol/L    Chloride 110 (H) 98 - 107 mmol/L    Carbon Dioxide (CO2) 17 (L) 22 - 29 mmol/L    Anion Gap 15 7 - 15 mmol/L    Urea Nitrogen 14.3 6.0 - 20.0 mg/dL    Creatinine 1.11 (H) 0.51 - 0.95 mg/dL     Calcium 8.5 (L) 8.6 - 10.0 mg/dL    Glucose 89 70 - 99 mg/dL    Alkaline Phosphatase 139 (H) 35 - 104 U/L    AST 20 10 - 35 U/L    ALT 10 10 - 35 U/L    Protein Total 5.5 (L) 6.4 - 8.3 g/dL    Albumin 2.4 (L) 3.5 - 5.2 g/dL    Bilirubin Total 0.3 <=1.2 mg/dL    GFR Estimate 72 >60 mL/min/1.73m2           Attestation:  I have reviewed today's vital signs, notes, medications, labs and imaging.  Amount of time spent managing this patient today:  35 minutes.     Tai Ocampo MD, MD

## 2024-03-15 ENCOUNTER — OFFICE VISIT (OUTPATIENT)
Dept: MATERNAL FETAL MEDICINE | Facility: HOSPITAL | Age: 24
End: 2024-03-15
Attending: OBSTETRICS & GYNECOLOGY
Payer: COMMERCIAL

## 2024-03-15 ENCOUNTER — ANCILLARY PROCEDURE (OUTPATIENT)
Dept: ULTRASOUND IMAGING | Facility: HOSPITAL | Age: 24
End: 2024-03-15
Attending: OBSTETRICS & GYNECOLOGY
Payer: COMMERCIAL

## 2024-03-15 DIAGNOSIS — O99.213 OBESITY IN PREGNANCY, ANTEPARTUM, THIRD TRIMESTER: Primary | ICD-10-CM

## 2024-03-15 DIAGNOSIS — O99.212 OBESITY AFFECTING PREGNANCY IN SECOND TRIMESTER, UNSPECIFIED OBESITY TYPE: ICD-10-CM

## 2024-03-15 DIAGNOSIS — Z36.89 ENCOUNTER FOR ULTRASOUND TO CHECK FETAL GROWTH: ICD-10-CM

## 2024-03-15 PROCEDURE — 76816 OB US FOLLOW-UP PER FETUS: CPT | Mod: 26 | Performed by: OBSTETRICS & GYNECOLOGY

## 2024-03-15 PROCEDURE — 99207 PR NO CHARGE LOS: CPT | Performed by: OBSTETRICS & GYNECOLOGY

## 2024-03-15 PROCEDURE — 76816 OB US FOLLOW-UP PER FETUS: CPT

## 2024-03-15 NOTE — PROGRESS NOTES
Please see full imaging report from ViewPoint program under imaging tab.    Gi Dukes MD  Maternal Fetal Medicine

## 2024-03-15 NOTE — NURSING NOTE
Patient reports  fetal movement, denies pain,  contractions, leaking of fluid, or bleeding.  Patient denies headache, visual changes, nausea/vomiting, epigastric pain related to preeclampsia.   SBAR given to LOREN PITTS, see their note in Epic.

## 2024-03-21 ENCOUNTER — PRENATAL OFFICE VISIT (OUTPATIENT)
Dept: MIDWIFE SERVICES | Facility: CLINIC | Age: 24
End: 2024-03-21
Payer: COMMERCIAL

## 2024-03-21 VITALS — WEIGHT: 275 LBS | DIASTOLIC BLOOD PRESSURE: 64 MMHG | SYSTOLIC BLOOD PRESSURE: 110 MMHG | BODY MASS INDEX: 43.07 KG/M2

## 2024-03-21 DIAGNOSIS — O09.899 SHORT INTERVAL BETWEEN PREGNANCIES AFFECTING PREGNANCY, ANTEPARTUM: ICD-10-CM

## 2024-03-21 DIAGNOSIS — O99.013 ANEMIA DURING PREGNANCY IN THIRD TRIMESTER: ICD-10-CM

## 2024-03-21 DIAGNOSIS — O09.92 SUPERVISION OF HIGH RISK PREGNANCY IN SECOND TRIMESTER: Primary | ICD-10-CM

## 2024-03-21 DIAGNOSIS — Z91.89 AT RISK FOR COMPLICATION OF PREGNANCY: ICD-10-CM

## 2024-03-21 DIAGNOSIS — E66.01 MORBID OBESITY (H): ICD-10-CM

## 2024-03-21 DIAGNOSIS — E03.9 ACQUIRED HYPOTHYROIDISM: ICD-10-CM

## 2024-03-21 DIAGNOSIS — F17.200 VAPING NICOTINE DEPENDENCE, NON-TOBACCO PRODUCT: ICD-10-CM

## 2024-03-21 LAB
ERYTHROCYTE [DISTWIDTH] IN BLOOD BY AUTOMATED COUNT: 18.4 % (ref 10–15)
HCT VFR BLD AUTO: 34.4 % (ref 35–47)
HGB BLD-MCNC: 10.8 G/DL (ref 11.7–15.7)
MCH RBC QN AUTO: 23.7 PG (ref 26.5–33)
MCHC RBC AUTO-ENTMCNC: 31.4 G/DL (ref 31.5–36.5)
MCV RBC AUTO: 75 FL (ref 78–100)
PLATELET # BLD AUTO: 199 10E3/UL (ref 150–450)
RBC # BLD AUTO: 4.56 10E6/UL (ref 3.8–5.2)
T PALLIDUM AB SER QL: NONREACTIVE
WBC # BLD AUTO: 7.2 10E3/UL (ref 4–11)

## 2024-03-21 PROCEDURE — 86780 TREPONEMA PALLIDUM: CPT | Performed by: ADVANCED PRACTICE MIDWIFE

## 2024-03-21 PROCEDURE — 36415 COLL VENOUS BLD VENIPUNCTURE: CPT | Performed by: ADVANCED PRACTICE MIDWIFE

## 2024-03-21 PROCEDURE — 84439 ASSAY OF FREE THYROXINE: CPT | Performed by: ADVANCED PRACTICE MIDWIFE

## 2024-03-21 PROCEDURE — 90715 TDAP VACCINE 7 YRS/> IM: CPT | Performed by: ADVANCED PRACTICE MIDWIFE

## 2024-03-21 PROCEDURE — 83540 ASSAY OF IRON: CPT | Performed by: ADVANCED PRACTICE MIDWIFE

## 2024-03-21 PROCEDURE — 82728 ASSAY OF FERRITIN: CPT | Performed by: ADVANCED PRACTICE MIDWIFE

## 2024-03-21 PROCEDURE — 85027 COMPLETE CBC AUTOMATED: CPT | Performed by: ADVANCED PRACTICE MIDWIFE

## 2024-03-21 PROCEDURE — 99207 PR PRENATAL VISIT: CPT | Performed by: ADVANCED PRACTICE MIDWIFE

## 2024-03-21 PROCEDURE — 83550 IRON BINDING TEST: CPT | Performed by: ADVANCED PRACTICE MIDWIFE

## 2024-03-21 PROCEDURE — 84443 ASSAY THYROID STIM HORMONE: CPT | Performed by: ADVANCED PRACTICE MIDWIFE

## 2024-03-21 PROCEDURE — 90471 IMMUNIZATION ADMIN: CPT | Performed by: ADVANCED PRACTICE MIDWIFE

## 2024-03-21 NOTE — PATIENT INSTRUCTIONS
You have been provided the My Labor and Birth Wishes document.  Please review at home and bring to your next prenatal visit. Bring this sheet to the hospital for your birth. Give copies to your care team members and support person.   Additional copies can be found here:  [email or web address]  Weeks 26 to 30 of Your Pregnancy: Care Instructions  You're starting your last trimester. You'll probably feel your baby moving around more. Your back may ache as your body gets used to your baby's size and length. Take care of yourself, and pay attention to what your body needs.    Talk to your doctor about getting the Tdap shot. It will help protect your  against whooping cough (pertussis). Also ask your doctor about flu and COVID-19 shots if you haven't had them yet. If your blood type is Rh negative, you may be given a shot of Rh immune globulin (such as RhoGAM). It can help prevent problems for your baby.    You may have Deville-Nielson contractions. They are single or several strong contractions without a pattern. These are practice contractions but not the start of labor.  Be kind to yourself.     Take breaks when you're tired.  Change positions often. Don't sit for too long or stand for too long.  At work, rest during breaks if you can. If you don't get breaks, talk to your doctor about writing a letter to your employer to request them.  Avoid fumes, chemicals, and tobacco smoke.  Be sexual if you want to.     You may be interested in sex, or you may not. Everyone is different.  Sex is okay unless your doctor tells you not to.  Your belly can make it hard to find good positions for sex. [place] and explore.  Watch for signs of  labor.    These signs include:   Menstrual-like cramps. Or you may have pain or pressure in your pelvis that happens in a pattern.  About 6 or more contractions in an hour (even after rest and a glass of water).  A low, dull backache that doesn't go away when you change  "positions.  An increase or change in vaginal discharge.  Light vaginal bleeding or spotting.  Your water breaking.  Know what to do if you think you are having contractions.     Drink 1 or 2 glasses of water.  Lie down on your left side for at least an hour.  While on your side, feel the top of your belly to see if it's tight.  Write down your contractions for an hour. Time how long it is from the start of one contraction to the start of the next.  Call your doctor if you have regular contractions.  Follow-up care is a key part of your treatment and safety. Be sure to make and go to all appointments, and call your doctor if you are having problems. It's also a good idea to know your test results and keep a list of the medicines you take.  Where can you learn more?  Go to https://www.[employer].net/patiented  Enter S999 in the search box to learn more about \"Weeks 26 to 30 of Your Pregnancy: Care Instructions.\"  Current as of: July 10, 2023               Content Version: 14.0    1304-3167 [employer].   Care instructions adapted under license by your healthcare professional. If you have questions about a medical condition or this instruction, always ask your healthcare professional. [employer] disclaims any warranty or liability for your use of this information.      Counting Your Baby's Kicks: Care Instructions  Overview     Counting your baby's kicks is one way your doctor can tell that your baby is healthy. You will probably feel your baby move for the first time between 16 and 22 weeks. The movement may feel like flutters rather than kicks. Your baby may move more at certain times of the day. When you are active, you may notice less kicking than when you are resting. At your prenatal visits, your doctor will ask whether the baby is active.  In your last trimester, your doctor may ask you to count the number of times you feel your baby move.  Follow-up care is a key part of your " "treatment and safety. Be sure to make and go to all appointments, and call your doctor if you are having problems. It's also a good idea to know your test results and keep a list of the medicines you take.  How do you count fetal kicks?  A common method of checking your baby's movement is to note the length of time it takes to count 10 movements (such as kicks, flutters, or rolls).  Pick your baby's most active time of day to count. This may be any time from morning to evening.  If you don't feel 10 movements in an hour, have something to eat or drink and count for another hour. If you don't feel at least 10 movements in the 2-hour period, call your doctor.  Do not use an at-home Doppler heart monitor in place of counting fetal movements.  When should you call for help?   Call your doctor now or seek immediate medical care if:    You feel fewer than 10 movements in a 2-hour period.     You noticed that your baby has stopped moving or is moving less than normal.   Watch closely for changes in your health, and be sure to contact your doctor if you have any problems.  Where can you learn more?  Go to https://www.[employer].net/patiented  Enter U048 in the search box to learn more about \"Counting Your Baby's Kicks: Care Instructions.\"  Current as of: July 10, 2023               Content Version: 14.0    2339-8837 [employer].   Care instructions adapted under license by your healthcare professional. If you have questions about a medical condition or this instruction, always ask your healthcare professional. [employer] disclaims any warranty or liability for your use of this information.      "

## 2024-03-21 NOTE — PROGRESS NOTES
Samantha presents to the clinic with her daughter.  Feeling well overall, but fatigued.  She denies headache, visual disturbances, right upper quadrant abdominal pain, regular uterine contractions, loss of fluid or vaginal bleeding.  MFM fetal growth ultrasound performed on 3/15/2024, WNL.  Follow-up fetal growth ultrasound scheduled with maternal-fetal medicine on 2024 followed by weekly BPP's due to pregravid BMI.  Patient has experienced a 3 pound weight loss this pregnancy, eating regularly.  Please explore frequency of vaping when RTC and whether patient desires assistance for cessation.  1 hour GCT result 88 mg/dL and hemoglobin was 10.0 g/dL on 2024 (iron studies not performed on that day).  Patient is taking an oral iron supplement with vitamin C and vitamin B12 at a different time of day than her prenatal vitamin.  Labs today: CBC, iron studies, ferritin, TSH and free T4 obtained.  Accepting of Tdap vaccine today and understands  benefits.   labor precautions and danger signs and symptoms reviewed.  All questions answered.  Encouraged daily fetal movement counting and to call or return to clinic with any questions, concerns, or as needed.

## 2024-03-22 ENCOUNTER — TELEPHONE (OUTPATIENT)
Dept: MIDWIFE SERVICES | Facility: CLINIC | Age: 24
End: 2024-03-22
Payer: COMMERCIAL

## 2024-03-22 DIAGNOSIS — O99.013 ANEMIA AFFECTING PREGNANCY IN THIRD TRIMESTER: Primary | ICD-10-CM

## 2024-03-22 LAB
FERRITIN SERPL-MCNC: 16 NG/ML (ref 6–175)
IRON BINDING CAPACITY (ROCHE): 438 UG/DL (ref 240–430)
IRON SATN MFR SERPL: 68 % (ref 15–46)
IRON SERPL-MCNC: 296 UG/DL (ref 37–145)
T4 FREE SERPL-MCNC: 1.16 NG/DL (ref 0.9–1.7)
TSH SERPL DL<=0.005 MIU/L-ACNC: 2.29 UIU/ML (ref 0.3–4.2)

## 2024-03-22 RX ORDER — METHYLPREDNISOLONE SODIUM SUCCINATE 125 MG/2ML
125 INJECTION, POWDER, LYOPHILIZED, FOR SOLUTION INTRAMUSCULAR; INTRAVENOUS
Start: 2024-04-01

## 2024-03-22 RX ORDER — MEPERIDINE HYDROCHLORIDE 25 MG/ML
25 INJECTION INTRAMUSCULAR; INTRAVENOUS; SUBCUTANEOUS EVERY 30 MIN PRN
OUTPATIENT
Start: 2024-04-01

## 2024-03-22 RX ORDER — HEPARIN SODIUM,PORCINE 10 UNIT/ML
5-20 VIAL (ML) INTRAVENOUS DAILY PRN
OUTPATIENT
Start: 2024-04-01

## 2024-03-22 RX ORDER — DIPHENHYDRAMINE HYDROCHLORIDE 50 MG/ML
50 INJECTION INTRAMUSCULAR; INTRAVENOUS
Start: 2024-04-01

## 2024-03-22 RX ORDER — HEPARIN SODIUM (PORCINE) LOCK FLUSH IV SOLN 100 UNIT/ML 100 UNIT/ML
5 SOLUTION INTRAVENOUS
OUTPATIENT
Start: 2024-04-01

## 2024-03-22 RX ORDER — EPINEPHRINE 1 MG/ML
0.3 INJECTION, SOLUTION, CONCENTRATE INTRAVENOUS EVERY 5 MIN PRN
OUTPATIENT
Start: 2024-04-01

## 2024-03-22 RX ORDER — ALBUTEROL SULFATE 0.83 MG/ML
2.5 SOLUTION RESPIRATORY (INHALATION)
OUTPATIENT
Start: 2024-04-01

## 2024-03-22 RX ORDER — ALBUTEROL SULFATE 90 UG/1
1-2 AEROSOL, METERED RESPIRATORY (INHALATION)
Start: 2024-04-01

## 2024-03-28 ENCOUNTER — MYC REFILL (OUTPATIENT)
Dept: MIDWIFE SERVICES | Facility: CLINIC | Age: 24
End: 2024-03-28
Payer: COMMERCIAL

## 2024-03-28 DIAGNOSIS — E03.9 ACQUIRED HYPOTHYROIDISM: ICD-10-CM

## 2024-03-28 RX ORDER — LEVOTHYROXINE SODIUM 50 UG/1
50 TABLET ORAL DAILY
Qty: 90 TABLET | Refills: 0 | Status: SHIPPED | OUTPATIENT
Start: 2024-03-28 | End: 2024-06-28

## 2024-03-28 NOTE — TELEPHONE ENCOUNTER
Refill request received from patient.  Medication requested:    Pending Prescriptions:                       Disp   Refills    levothyroxine (SYNTHROID/LEVOTHROID) 50 M*90 tab*0            Sig: Take 1 tablet (50 mcg) by mouth daily    Medication last prescribed: 01/02/2024  Last visit with [facility] CN group: 3/21/2024  Upcoming appointment(s): 4/11/2024. 4/18/2024, 4/25/2024    Refill request routed to on-call CNM for review.

## 2024-04-11 ENCOUNTER — PRENATAL OFFICE VISIT (OUTPATIENT)
Dept: MIDWIFE SERVICES | Facility: CLINIC | Age: 24
End: 2024-04-11
Payer: COMMERCIAL

## 2024-04-11 VITALS
WEIGHT: 280.2 LBS | HEART RATE: 103 BPM | SYSTOLIC BLOOD PRESSURE: 110 MMHG | BODY MASS INDEX: 43.98 KG/M2 | DIASTOLIC BLOOD PRESSURE: 64 MMHG | HEIGHT: 67 IN

## 2024-04-11 DIAGNOSIS — O09.899 SHORT INTERVAL BETWEEN PREGNANCIES AFFECTING PREGNANCY, ANTEPARTUM: ICD-10-CM

## 2024-04-11 DIAGNOSIS — O09.92 SUPERVISION OF HIGH RISK PREGNANCY IN SECOND TRIMESTER: Primary | ICD-10-CM

## 2024-04-11 LAB — HGB BLD-MCNC: 10.9 G/DL (ref 11.7–15.7)

## 2024-04-11 PROCEDURE — 87086 URINE CULTURE/COLONY COUNT: CPT | Performed by: ADVANCED PRACTICE MIDWIFE

## 2024-04-11 PROCEDURE — 99207 PR PRENATAL VISIT: CPT | Performed by: ADVANCED PRACTICE MIDWIFE

## 2024-04-11 PROCEDURE — 36415 COLL VENOUS BLD VENIPUNCTURE: CPT | Performed by: ADVANCED PRACTICE MIDWIFE

## 2024-04-11 NOTE — PATIENT INSTRUCTIONS
"\"We hope you had a positive experience and that you can definitely recommend [facility] to your family and friends. You ll be receiving a survey soon and we look forward to hearing your feedback\".    [facility]  - Contact information:  Appointment line and to get a hold of CNM in clinic Monday-Friday 8 am - 5 pm:  [phone].  There are some clinics with early start times (1st appointment 7:40 am) and others with evening hours (last appointment 6:20 pm).  Most are typically open from 8 am to 5 pm.    CNM on call answering service: [phone].  Specify your hospital of choice and leave a brief message for CNM;  will then page CNM who is on call at your specified hospital and you should receive a call back with 15 minutes.  Be sure that your ringer is audible and that you can accept blocked calls so that we can get back in touch with you! This number should be reserved for urgent needs if during the day, before 8 am, after 5 pm, weekends, holidays.    Contact the on-call CNM with warning signs, such as:  vaginal bleeding   Vaginal discharge and itching or pain and burning during urination  Leg/calf pain or swelling on one side  severe abdominal pain  nausea and vomiting more than 4-5 times a day, or if you are unable to keep anything down  fever more than 100.4 degrees F.     [employer]  After each of your visits you are welcome to check Helpmycash for your visit summary including education and links to information relevant to your pregnancy and/or well woman care.   Find the \"Visits\" tab at the top of the page, you will see a list of recent visits and for each visit a for link for \"View After Visit Summary.\" View of your After Visit Summary will allow you to read our recommendations from your visit, review any education provided, and link to websites with useful information.   If you have any questions or difficulty navigating [place], please feel free to " "contact us and we will do our best to direct you.  Meet the Midwives from [facility]  You are invited to an informational meet and greet with [facility]'s [facility] Certified Nurse-Midwives. Our free \"Meet the Midwives\" event is a great opportunity to learn about our midwives' philosophy and experience, the hospitals where we can assist with your birth, and answer questions you may have. Partners, friends, and family are welcome to attend. Currently, this is a virtual event.  Date  Last Thursday of every month at 7 pm.    Link to next (live) meeting  https://[facility].org/meet-the-midwives  To Join by Telephone (audio only) Call:   [phone] Phone Conference ID: [phone] #      Touring the Maternity Care Center  At this time we are offering a virtual tour of the Maternity Care Centers at both [facility] and [facility]:   [facility] Maternity Care:   https://[facility].[employer]/locations/the-birthplace-at--[facility] Maternity Care:   https://[employer].[facility]/locations/[facility]    Scroll to the bottom of the page in this link if the above link does not work.      Breastfeeding and Birth Control  How do I decide what birth control method is best for me while I am breastfeeding?  Choosing a method of birth control is very personal. First, answer the following questions:     Do you want to have more children?   How much spacing between births do you want for your children?   Do you smoke or have you had any health problems, such as liver disease or a blood clot?  Talk about the answers to each of these questions with your health care provider to help you choose the best method for you.    Can I use breastfeeding as my birth control?  Using breastfeeding as your birth control (the lactational amenorrhea method) can be a good way to keep from getting pregnant in the first " months after the baby is born. Each time your baby nurses, your body releases a hormone called prolactin, which stops your body from making the hormones that cause you to ovulate (release an egg). If you are not ovulating, you cannot get pregnant.    The lactational amenorrhea method works only if:   you have not started your period yet.   you are breastfeeding only and not giving your baby any other food or drink.   you are breastfeeding at least every 4 hours during the day and every 6 hours at night.   your baby is less than 6 months old.  When any 1 of these 4 things is not happening, you no longer have good protection from getting pregnant, and you should use another form of birth control.    What birth control methods are safe for me to use while I breastfeed?    Methods without hormones  Methods without hormones do not affect you, your baby, or your breastfeeding.  Methods without hormones that are the most effective   The copper intrauterine contraceptive device (IUD) (ParaGard) is a small, T-shaped device that is in- serted into your uterus (womb) through the vagina and cervix. The copper IUD lasts for 10 years.   Sterilization (getting your tubes tied or your partner having a vasectomy) is very effective, but it is per- manent. You should choose sterilization only if you do not want to have more children.  A method without hormones that is effective   The lactational amenorrhea method described above is effective for the first 6 months.  Methods without hormones that are less effective   Natural family planning is monitoring your body for signs of ovulation and not having sex when you think you are ovulating. This method is reliable only if you are having regular periods every month.   Barrier methods (condoms, diaphragms, sponges, and spermicides) are used at the time you have sex. These methods are effective only if you use them correctly every time.    Methods with hormones  Birth control methods that  use hormones can be used while you are breastfeeding. They may have a small effect on lowering the amount of milk you make. All hormones will get into your breast milk in very small amounts, but there is no known harm to your baby from this small amount of hormone in breast milk.only methodsmethods use only 1 hormone, called progestin. You can start them right after your baby is born or wait 4 to 6 weeks to make sure your milk supply is good.   Progestin-only pills ( minipills ): If you like to take pills every day, you can use the minipill. In order forpill to work well, you have to take 1 at the same time each day. When you stop breastfeeding, you should start pills that have both estrogen and progestin because they are better at keeping you from get- ting pregnant.   Progestin IUD (Mirena): The progestin IUD is shaped and inserted into the uterus like the copper IUD. It works for up to 5 years. Both IUDs are usually inserted 4 to 6 weeks after the baby is born.  Progestin implant (Nexplanon): The progestin implant is a small matchstick-sized flexible darrion. It is placed into the fatty tissue in the back of your arm. It works for up to 3 years.  Progestin shot (Depo-Provera): The progestin shot is given every 3 months.    estrogen and progestin methods  These methods use 2 hormones, called estrogen and progestin.     These methods increase your risk of a blood clot, which is already higher than normal after you have a baby. You should not use them until your baby is at least 6 weeks old. The combined methods are not recommended as the first choice for women who are breastfeeding. If a combined method is the one that you feel will be best for you to prevent getting pregnant, these methods are okay to use while breastfeeding.   Combined birth control pills: You take a pill each day.  Vaginal ring (NuvaRing): The ring is worn in the vagina for 3 weeks then left out for 1 week before youin a new ring.  Patch (Ortho  Azra): The patch is placed on your skin and changed every week for 3 weeks then left off forweek before putting a new patch on a different area of your skin.    Pediatric Care Providers at [facility]:    Choosing the right provider is one of the most important decisions you ll make about your health care. We can help you find the right one. Remember, you re looking for a provider you can trust and work with to improve your health and well-being, so take time to think about what you need. Depending on how complicated your health care needs are, you may need to see more than one type of provider.    Primary Care Providers: You ll see a primary care provider first for most health issues. They ll work with you to get your recommended screenings, help you manage chronic conditions, and refer you to other types of providers if you need them. Your primary care provider may be called a family physician or doctor, internist, general practitioner, nurse practitioner, or physician s assistant. Your child or teenager s provider may be called a pediatrician.  Specialists: You ll see a specialist for certain services or to treat specific conditions. Specialists include cardiologists, oncologists, psychologists, allergists, podiatrists, and orthopedists. You may need a referral from your primary care provider before you go to a specialist in order for your health plan to pay for your visit.\Surajere are some tips for finding a provider where you live:  If you already have a provider you like and want to keep working with, call their office and ask if they accept your coverage.  Call your insurance company or state Medicaid and CHIP program. Look at their website or check your member handbook to find providers in your network who take your health coverage.  Ask your friends or family if they have providers they like and use these tools to compare health care providers in your area.    Family Medicine at  [facility]:      https://www.[facility].org/specialties/family-medicine    Many of our families enjoy all seeing the same doctor, who comes to know the whole family very well. We base our practice on the knowledge of the patient in the context of family and community.  WHY CHOOSE A FAMILY MEDICINE PHYSICIAN?  Ability of the whole family to see the same doctor  Focus on the whole person, including physical and emotional health  A personal relationship with their doctor that is nurtured over time  Respect for individual and family beliefs and values  No need to change primary care providers when a certain age is reached  Coordination of care when other health care services are needed    Pediatrics at  [facility]:   https://www.[facility].org/specialties/pediatric-care    Through a teaching affiliation with the [facility], [facility] staff keeps current on new developments in the field of pediatrics.     Everything we do centers around caring for children. We place special emphasis on wellness and prevention.pediatric care team includes a team of pediatricians and certified nurse practitioners who provide care to pediatric and adolescent patients ages 0 to 18, and some up to the age of 26. We offer preventive health maintenance for healthy children as well the diagnosis and treatment of common and chronic illnesses and injuries. In addition, we also offer several pediatric specialists who focus on adolescent health issues and developmental and behavioral issues.    Circumcision    Educational video:     [email or web address]    For More Information  American Academy of Family Physicians: Circumcision  http://familydoctor.org/familydoctor/en/pregnancy-newborns/caring-for-newborns/infant- care/circumcision.html  MedlinePlus: Circumcision (includes a slide show on the procedure)  www.nlm.nih.gov/medlineplus/circumcision.html  American Academy of Pediatrics:  Policy statement on circumcision  Http://pediatrics.aappublications.org/content/130/3/e756.abstract      Childbirth and Parenting Education:     Everyday Miracles:   https://www.everyday-miracles.org/    Free Video Series from [facility]: https://nursing.[facility]/academics/specialty-areas/nurse-midwifery/having-baby-prenatal-videos/having-baby-prenatal-and    Childbirth Education virtual (live) classes: www.Videodeclasse.com/classes  The Birth Hour: https://Fashion GPS/online-childbirth-class/  BirthED: https://www.birthedmn.com/  DALY parenting center: http://roundCorner/   (533) 724-QXRR  Blooma: (education, yoga & wellness) www.CENX  Enlightened Mama: www.enlightenedmama.Site Tour   Childbirth collective: (Parent topic nights)  www.childbirthcollective.org/  Hypnobabies:  www.hypnobabiestQ-Bot.Site Tour/  Hypnobirthing:  Http://Dole Tian.Site Tour/  Hypnobirthing virtual class: www.Cenify/hypnobirthing    Information about doulas:  Childbirth collective: http://www.childbirthcollective.org/  Doulas of [place] (RADHA):  www.radha.org  [facility]  project: http://twincitiesdoulaproject.com/     Early Childhood and Family Education (ECFE):  ECFE offers parents hands-on learning experiences that will nourish a lifetime of teachable moments.  http://ecfe.info/ecfe-home/    APPS and Podcasts:   Kelsey Allan Nurture    Evidence Based Birth  The Birth Hour (for birth stories)   Birthful   Expectful   The Longest Shortest Time  PregnancyPodcmario Calderon  https://www.downtobirthshow.com/    Book Recommendations:   Daisha Seltzer's Birthing From Within--first few chapters include a new-age tone, you may prefer to skip it and keep going, because there is good stuff later.  This book recommendation covers emotional preparation, but does cover coping with pain, and use of both pharmacological and nonpharmacological methods.    Guide to Childbirth by Alanis May  "Parminder  Childbirth Without Fear by Kenan Mariano Read    Dr. Parker' The Pregnancy Book and The Birth Book--the pregnancy book goes month-by month    The Birth Partner by Rosa Antunez    Womanly Art of Breastfeeding by La Leche League International   Bestfeeding by Kiarra Rodriguez--great pictures    Mothering Your Nursing Toddler, by Ree Rose.   Addresses dealing with so many of the challenging behaviors of a nursing toddler.  How Weaning Happens, by La Leche League.  Discusses weaning at all ages, from medically necessary weaning of an infant, all the way up to age 5 (or older), with why/why not, and strategies.  Very empowering book both for deciding to wean and deciding not to.    American College of Nurse-Midwives (ACNM) http://www.midwife.org/; look at the informational handouts at http://www.midwife.org/Share-With-Women     www.mymidwife.org    Mother to Baby (Medication and Herbal guidance in pregnancy): http://www.mothertobaby.org  Toll-Free Hotline: 560.825.8540  LactMed (Medication use while breastfeeding): http://toxnet.nlm.nih.gov/newtoxnet/lactmed.htm    Women's Health.gov:  http://www.womenshealth.gov/a-z-topics/index.html    American pregnancy association - http://americanpregnancy.org    Centering Pregnancy (group prenatal care option): http://centeringhealthcare.org    March of Dimes www.[employer]immon Technologies.com     FDA - Nutrition  www.mypyramid.gov  Under \"For Consumers,\" click on \"pregnant and breastfeeding women.\"      Centers for Disease Control and Prevention (CDC) - Vaccines : http://www.cdc.gov/vaccines/       When researching information on the web, question the validity of websites.  The domains .gov, .edu and.org tend to be more reliable information.  If there are a lot of advertisements, be cautious of the information provided. Stay away from blogs and chat rooms please!     Virtual Breastfeeding Support:    During this time of isolation, breastfeeding families need even more community! " " Here are some area organizations offering virtual support groups for breastfeeding:    [employer] Support Group,  at 10:30 am   Run by DANIEL Gallardo of [employer]   Go to [employer] Facebook page and click on \"events\" for link   https://www.facebook.com/events/016438848354393/  [facility] Milk Hour,  at 2:30 pm    Run by DANIEL Farrell   Go to [facility] FB page and send message to get link   https://www.[employer] Sciences.com/[facility] Hospitalundations/  [facility] holding virtual meetings the first Tuesday of each month, 8-9 pm, and the   Third Saturday, 10 - 11 am.  Go to [facility] and [place] FB page; message to get link https://www.[employer].Vizy/LLLofGoldReuben/?hc_location=[facility] offers a Lactation Lounge every Friday 12pm - 1pm, run by Milly Yancey [facility] Leader   Sign up via link at https://www.RedCritter/cbe-lactation  [facility] is offering virtual support groups every Monday, 10:30 am - 12 pm, run by nurse IBCLC   Https://www.facebook.com/events/512784852618746/    Prenatal Breastfeeding Classes:      [facility] is offering virtual breastfeeding and  care classes:  https://www.RedCritter/education-workshops  BirthEd childbirth and breastfeeding education offering virtual prenatal breastfeeding classes  https://www.birthedmn.com/workshops    Breastfeeding clinic visits are at [facility] on , [facility] on  and [facility] on . Call to schedule, [phone].    New Parent Connection:   Radha Mohr, [id number] [facility]  In-person meetings on  from 6 pm - 7:15 pm for parents of  to 9 months, at the same site.   All are free, drop-in, no registration required.    There are also free virtual meetings ongoing on " Tuesdays:  11:30 am - 12:30 pm for parents of newborns to 3 months  4:15 pm to 5:15 pm for parents of 3 to 9-month olds  For joining info parents should call Magaly Pardo at [phone]

## 2024-04-11 NOTE — PROGRESS NOTES
Samantha Cody is a 23 year old  at 31w6d, presenting today with her mom and daughter for routine OB care.  She has started supplementing with PO Fe with vitamin C and requests repeat hgb today, as not sure she wants to do IV iron infusions. Difficult to travel to visits. Discussed option for modified infusion schedule and scheduling at site closer to home.   Concerns: cramping. Felt more intense when awake at night. Nothing since this morning. No fluid leaking or bleeding. Normal fetal movement. No urinary symptoms. Will repeat UC today as last done >2 months ago. Reports increased constipation since starting iron. Last BM yesterday, going 2-4 days between. Needing to strain. Has not tried anything for constipation. Okay to go to Fe every other day. Reviewed additional constipation measures and advised increased water intake, high fiber foods (fresh veggies, fruits, legumes), physical activity for 30 minutes daily, and stool softeners.   Had occasional headaches prior to last week which have improved. No vision changes or epigastric pain.   Total weight gain 2#; interval gain 5# with normal fundal height.   Discussed kick counts and fetal movement.  Discussed PTL, PROM, and when to call or come in.  RTC 2 weeks. Needs TSH with Free T4 at that time.     RAFY Cabrera CNM

## 2024-04-12 LAB — BACTERIA UR CULT: NORMAL

## 2024-04-23 ENCOUNTER — MYC REFILL (OUTPATIENT)
Dept: MIDWIFE SERVICES | Facility: CLINIC | Age: 24
End: 2024-04-23
Payer: COMMERCIAL

## 2024-04-23 DIAGNOSIS — R11.0 NAUSEA: ICD-10-CM

## 2024-04-23 RX ORDER — ONDANSETRON 4 MG/1
4 TABLET, ORALLY DISINTEGRATING ORAL EVERY 8 HOURS PRN
Qty: 30 TABLET | Refills: 1 | Status: ON HOLD | OUTPATIENT
Start: 2024-04-23 | End: 2024-06-03

## 2024-04-23 NOTE — TELEPHONE ENCOUNTER
Refill request received from patient.  Medication requested:    Pending Prescriptions:                       Disp   Refills    ondansetron (ZOFRAN ODT) 4 MG ODT tab     30 tab*1            Sig: Take 1 tablet (4 mg) by mouth every 8 hours as           needed for nausea    Medication last prescribed: 3/08/24  Last visit with [facility] CNM group: 4/11/24  Upcoming appointment(s): 4/25/24    Refill request routed to on-call CNM for review.

## 2024-04-25 ENCOUNTER — PRENATAL OFFICE VISIT (OUTPATIENT)
Dept: MIDWIFE SERVICES | Facility: CLINIC | Age: 24
End: 2024-04-25
Payer: COMMERCIAL

## 2024-04-25 VITALS — SYSTOLIC BLOOD PRESSURE: 108 MMHG | BODY MASS INDEX: 43.85 KG/M2 | WEIGHT: 280 LBS | DIASTOLIC BLOOD PRESSURE: 64 MMHG

## 2024-04-25 DIAGNOSIS — E03.9 ACQUIRED HYPOTHYROIDISM: ICD-10-CM

## 2024-04-25 DIAGNOSIS — Z91.89 AT RISK FOR COMPLICATION OF PREGNANCY: ICD-10-CM

## 2024-04-25 DIAGNOSIS — E66.01 MORBID OBESITY (H): Primary | ICD-10-CM

## 2024-04-25 DIAGNOSIS — O09.93 SUPERVISION OF HIGH RISK PREGNANCY IN THIRD TRIMESTER: ICD-10-CM

## 2024-04-25 DIAGNOSIS — O99.013 ANEMIA AFFECTING PREGNANCY IN THIRD TRIMESTER: ICD-10-CM

## 2024-04-25 DIAGNOSIS — O09.899 SHORT INTERVAL BETWEEN PREGNANCIES AFFECTING PREGNANCY, ANTEPARTUM: ICD-10-CM

## 2024-04-25 PROCEDURE — 99207 PR PRENATAL VISIT: CPT | Performed by: ADVANCED PRACTICE MIDWIFE

## 2024-04-25 NOTE — PROGRESS NOTES
Next visit: Explore vaping frequency and complete CNM details under pregnancy diagnosis.  Samantha presents to the clinic today by herself.  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  All is well.  She has no concerns today.  Very eager for baby's arrival!  She will desire elective IOL at 39+0 weeks.  She is scheduled for [facility] comprehensive US tomorrow and will subsequently undergo weekly  surveillance due to pregravid BMI of 43.  Baby is active, and she denies regular uterine contractions, loss of fluid or vaginal bleeding.  She has had a total weight gain of 2 pounds this pregnancy, WNL.  EPDS today: , 0 to #10.  Written information given regarding GBS, perineal massage, water for labor and birth, nitrous oxide, VTE, lactation resources, postpartum depression/anxiety, wellbeing, depression/anxiety during/after pregnancy.  Next visit: GBS RV culture, TSH, free T4 and hemoglobin.  Samantha is taking an oral iron supplement as directed.   labor precautions and danger signs and symptoms reviewed.  All questions answered.  Encouraged daily fetal movement counting and to call or return to clinic with any questions, concerns, or as needed.

## 2024-04-25 NOTE — PATIENT INSTRUCTIONS
Weeks 34 to 36 of Your Pregnancy: Care Instructions  Your belly has grown quite large. It's almost time to give birth! Your baby's lungs are almost ready to breathe air. The skull bones are firm enough to protect your baby's head. But they're soft enough to move down through the birth canal.    You might be wondering what to expect during labor. Because each birth is different, there's no way to know exactly what childbirth will be like for you. Talk to your doctor or midwife about any concerns you have.    You'll probably have a test for group B streptococcus (GBS). GBS is bacteria that can live in the vagina and rectum. GBS can make your baby sick after birth. If you test positive, you'll get antibiotics during labor.    Choose what type of pain relief you would like during labor.  You can choose from a few types, including medicine and non-medicine options. You may want to use several types of pain relief.     Know how medicines can help with pain during labor.  Some medicines lower anxiety and help with some of the pain. Others make your lower body numb so that you will feel less pain.     Tell your doctor about your pain medicine choice.  Do this before you start labor or very early in your labor. You may be able to change your mind during labor.     Learn about the stages of labor.    The first stage includes the early (latent) and active phases of labor. Contractions start in early labor. During active labor, contractions get stronger, last longer, and happen more often. And the cervix opens more rapidly.  The second stage starts when you're ready to push. During this stage, your baby is born.  During the third stage, you'll usually have a few more contractions to push out the placenta.   Follow-up care is a key part of your treatment and safety. Be sure to make and go to all appointments, and call your doctor if you are having problems. It's also a good idea to know your test results and keep a list of the  "medicines you take.  Where can you learn more?  Go to https://www.[facility].net/patiented  Enter B912 in the search box to learn more about \"Weeks 34 to 36 of Your Pregnancy: Care Instructions.\"  Current as of: July 10, 2023               Content Version: 14.0    6225-3827 [employer].   Care instructions adapted under license by your healthcare professional. If you have questions about a medical condition or this instruction, always ask your healthcare professional. [employer] disclaims any warranty or liability for your use of this information.      Group B Strep During Pregnancy: Care Instructions  Overview     Group B strep infection is caused by a type of bacteria. It's a different kind of bacteria than the kind that causes strep throat.  You may have this kind of bacteria in your body. Sometimes it may cause an infection, but most of the time it doesn't make you sick or cause symptoms. But if you pass the bacteria to your baby during the birth, it can cause serious health problems for your baby.  If you have this bacteria in your body, you will get antibiotics when you are in labor. Antibiotics help prevent problems for a  baby.  After birth, doctors will watch and may test your baby. If your baby tests positive for Group B strep, your baby will get antibiotics.  If you plan to breastfeed your baby, don't worry. It will be safe to breastfeed.  Follow-up care is a key part of your treatment and safety. Be sure to make and go to all appointments, and call your doctor if you are having problems. It's also a good idea to know your test results and keep a list of the medicines you take.  How can you care for yourself at home?  If your doctor has prescribed antibiotics, take them as directed. Do not stop taking them just because you feel better. You need to take the full course of antibiotics.  Tell your doctor if you are allergic to any antibiotic.  If you go into labor, or your " "water breaks, go to the hospital. Your doctor will give you antibiotics to help protect your baby from infection.  Tell the doctors and nurses if you have an allergy to penicillin.  Tell the doctors and nurses at the hospital that you tested positive for group B strep.  When should you call for help?   Call your doctor now or seek immediate medical care if:    You have symptoms of a urinary tract infection. These may include:  Pain or burning when you urinate.  A frequent need to urinate without being able to pass much urine.  Pain in the flank, which is just below the rib cage and above the waist on either side of the back.  Blood in your urine.  A fever.     You think you are in labor or your water has broken.     You have pain in your belly or pelvis.   Watch closely for changes in your health, and be sure to contact your doctor if you have any problems.  Where can you learn more?  Go to https://www.[facility].net/patiented  Enter M001 in the search box to learn more about \"Group B Strep During Pregnancy: Care Instructions.\"  Current as of: June 12, 2023               Content Version: 14.0    9073-5926 [employer].   Care instructions adapted under license by your healthcare professional. If you have questions about a medical condition or this instruction, always ask your healthcare professional. [employer] disclaims any warranty or liability for your use of this information.      "

## 2024-04-26 ENCOUNTER — ANCILLARY PROCEDURE (OUTPATIENT)
Dept: ULTRASOUND IMAGING | Facility: HOSPITAL | Age: 24
End: 2024-04-26
Attending: OBSTETRICS & GYNECOLOGY
Payer: COMMERCIAL

## 2024-04-26 ENCOUNTER — OFFICE VISIT (OUTPATIENT)
Dept: MATERNAL FETAL MEDICINE | Facility: HOSPITAL | Age: 24
End: 2024-04-26
Attending: OBSTETRICS & GYNECOLOGY
Payer: COMMERCIAL

## 2024-04-26 DIAGNOSIS — O99.213 OBESITY AFFECTING PREGNANCY IN THIRD TRIMESTER, UNSPECIFIED OBESITY TYPE: Primary | ICD-10-CM

## 2024-04-26 DIAGNOSIS — O99.213 OBESITY IN PREGNANCY, ANTEPARTUM, THIRD TRIMESTER: ICD-10-CM

## 2024-04-26 PROCEDURE — 76819 FETAL BIOPHYS PROFIL W/O NST: CPT | Mod: 26 | Performed by: OBSTETRICS & GYNECOLOGY

## 2024-04-26 PROCEDURE — 76819 FETAL BIOPHYS PROFIL W/O NST: CPT

## 2024-04-26 PROCEDURE — 76816 OB US FOLLOW-UP PER FETUS: CPT | Mod: 26 | Performed by: OBSTETRICS & GYNECOLOGY

## 2024-04-26 PROCEDURE — 76816 OB US FOLLOW-UP PER FETUS: CPT

## 2024-04-26 PROCEDURE — 99207 PR NO CHARGE LOS: CPT | Performed by: OBSTETRICS & GYNECOLOGY

## 2024-04-26 NOTE — NURSING NOTE
Samantha BUTCHER Escobar is a  at 34w0d who presents to [facility] for follow up growth ultrasound and BPP. Pt reports positive fetal movement. Pt denies bldg/lof/change in discharge, contractions, headache, vision changes, chest pain/SOB or edema. SBAR given to Dr. Mcgee, see note in Epic.

## 2024-05-03 ENCOUNTER — ANCILLARY PROCEDURE (OUTPATIENT)
Dept: ULTRASOUND IMAGING | Facility: HOSPITAL | Age: 24
End: 2024-05-03
Attending: OBSTETRICS & GYNECOLOGY
Payer: COMMERCIAL

## 2024-05-03 ENCOUNTER — PRENATAL OFFICE VISIT (OUTPATIENT)
Dept: MIDWIFE SERVICES | Facility: CLINIC | Age: 24
End: 2024-05-03
Payer: COMMERCIAL

## 2024-05-03 ENCOUNTER — OFFICE VISIT (OUTPATIENT)
Dept: MATERNAL FETAL MEDICINE | Facility: HOSPITAL | Age: 24
End: 2024-05-03
Attending: OBSTETRICS & GYNECOLOGY
Payer: COMMERCIAL

## 2024-05-03 VITALS — SYSTOLIC BLOOD PRESSURE: 114 MMHG | DIASTOLIC BLOOD PRESSURE: 76 MMHG | BODY MASS INDEX: 43.54 KG/M2 | WEIGHT: 278 LBS

## 2024-05-03 DIAGNOSIS — O09.93 SUPERVISION OF HIGH RISK PREGNANCY IN THIRD TRIMESTER: Primary | ICD-10-CM

## 2024-05-03 DIAGNOSIS — O09.899 SHORT INTERVAL BETWEEN PREGNANCIES AFFECTING PREGNANCY, ANTEPARTUM: ICD-10-CM

## 2024-05-03 DIAGNOSIS — H53.9 CHANGES IN VISION: ICD-10-CM

## 2024-05-03 DIAGNOSIS — O99.213 OBESITY AFFECTING PREGNANCY IN THIRD TRIMESTER, UNSPECIFIED OBESITY TYPE: Primary | ICD-10-CM

## 2024-05-03 DIAGNOSIS — Z91.89 AT RISK FOR COMPLICATION OF PREGNANCY: ICD-10-CM

## 2024-05-03 DIAGNOSIS — E03.9 ACQUIRED HYPOTHYROIDISM: ICD-10-CM

## 2024-05-03 DIAGNOSIS — O99.013 ANEMIA AFFECTING PREGNANCY IN THIRD TRIMESTER: ICD-10-CM

## 2024-05-03 DIAGNOSIS — O99.213 OBESITY IN PREGNANCY, ANTEPARTUM, THIRD TRIMESTER: ICD-10-CM

## 2024-05-03 LAB
ERYTHROCYTE [DISTWIDTH] IN BLOOD BY AUTOMATED COUNT: 17.4 % (ref 10–15)
HCT VFR BLD AUTO: 36.2 % (ref 35–47)
HGB BLD-MCNC: 11.6 G/DL (ref 11.7–15.7)
MCH RBC QN AUTO: 24.7 PG (ref 26.5–33)
MCHC RBC AUTO-ENTMCNC: 32 G/DL (ref 31.5–36.5)
MCV RBC AUTO: 77 FL (ref 78–100)
PLATELET # BLD AUTO: 184 10E3/UL (ref 150–450)
RBC # BLD AUTO: 4.69 10E6/UL (ref 3.8–5.2)
WBC # BLD AUTO: 7.6 10E3/UL (ref 4–11)

## 2024-05-03 PROCEDURE — 84439 ASSAY OF FREE THYROXINE: CPT | Performed by: ADVANCED PRACTICE MIDWIFE

## 2024-05-03 PROCEDURE — 36415 COLL VENOUS BLD VENIPUNCTURE: CPT | Performed by: ADVANCED PRACTICE MIDWIFE

## 2024-05-03 PROCEDURE — 76819 FETAL BIOPHYS PROFIL W/O NST: CPT

## 2024-05-03 PROCEDURE — 76819 FETAL BIOPHYS PROFIL W/O NST: CPT | Mod: 26 | Performed by: OBSTETRICS & GYNECOLOGY

## 2024-05-03 PROCEDURE — 80053 COMPREHEN METABOLIC PANEL: CPT | Performed by: ADVANCED PRACTICE MIDWIFE

## 2024-05-03 PROCEDURE — 84443 ASSAY THYROID STIM HORMONE: CPT | Performed by: ADVANCED PRACTICE MIDWIFE

## 2024-05-03 PROCEDURE — 99207 PR NO CHARGE LOS: CPT | Performed by: OBSTETRICS & GYNECOLOGY

## 2024-05-03 PROCEDURE — 99207 PR PRENATAL VISIT: CPT | Performed by: ADVANCED PRACTICE MIDWIFE

## 2024-05-03 PROCEDURE — 85027 COMPLETE CBC AUTOMATED: CPT | Performed by: ADVANCED PRACTICE MIDWIFE

## 2024-05-03 PROCEDURE — 84156 ASSAY OF PROTEIN URINE: CPT | Performed by: ADVANCED PRACTICE MIDWIFE

## 2024-05-03 NOTE — PROGRESS NOTES
"Samantha presents to the clinic by herself.  She reports seeing black spots when she stood up from a seated position today.  She denies headache or right upper quadrant abdominal pain.  \"I just feel off.\"  She is normotensive.  Labs obtained: Urine PC ratio, CBC, CMP, TSH and free T4.  Following this appointment is an M BPP; next visit, please complete CNM details under pregnancy diagnosis and explore vaping frequency.  Baby is active, and she denies regular uterine contractions, loss of fluid or vaginal bleeding.   labor precautions and danger signs and symptoms reviewed.  Next visit: GBS RV culture.  All questions answered.  Encouraged daily fetal movement counting and to call or return to clinic with any questions, concerns, or as needed.  "

## 2024-05-03 NOTE — NURSING NOTE
Samantha BUTCHER Escobar is a  at 35w0d who presents to [facility] for BPP. Pt reports positive fetal movement. Pt denies bldg/lof/change in discharge, contractions, headache, vision changes, chest pain/SOB or edema. SBAR given to Dr. Mcgee, see note in Epic.

## 2024-05-04 LAB
ALBUMIN MFR UR ELPH: 7.4 MG/DL
ALBUMIN SERPL BCG-MCNC: 3.6 G/DL (ref 3.5–5.2)
ALP SERPL-CCNC: 117 U/L (ref 40–150)
ALT SERPL W P-5'-P-CCNC: 9 U/L (ref 0–50)
ANION GAP SERPL CALCULATED.3IONS-SCNC: 11 MMOL/L (ref 7–15)
AST SERPL W P-5'-P-CCNC: 11 U/L (ref 0–45)
BILIRUB SERPL-MCNC: 0.2 MG/DL
BUN SERPL-MCNC: 6.3 MG/DL (ref 6–20)
CALCIUM SERPL-MCNC: 8.9 MG/DL (ref 8.6–10)
CHLORIDE SERPL-SCNC: 105 MMOL/L (ref 98–107)
CREAT SERPL-MCNC: 0.61 MG/DL (ref 0.51–0.95)
CREAT UR-MCNC: 69.5 MG/DL
DEPRECATED HCO3 PLAS-SCNC: 21 MMOL/L (ref 22–29)
EGFRCR SERPLBLD CKD-EPI 2021: >90 ML/MIN/1.73M2
GLUCOSE SERPL-MCNC: 90 MG/DL (ref 70–99)
POTASSIUM SERPL-SCNC: 4.4 MMOL/L (ref 3.4–5.3)
PROT SERPL-MCNC: 6.4 G/DL (ref 6.4–8.3)
PROT/CREAT 24H UR: 0.11 MG/MG CR (ref 0–0.2)
SODIUM SERPL-SCNC: 137 MMOL/L (ref 135–145)
T4 FREE SERPL-MCNC: 0.96 NG/DL (ref 0.9–1.7)
TSH SERPL DL<=0.005 MIU/L-ACNC: 2.2 UIU/ML (ref 0.3–4.2)

## 2024-05-09 ENCOUNTER — PRENATAL OFFICE VISIT (OUTPATIENT)
Dept: MIDWIFE SERVICES | Facility: CLINIC | Age: 24
End: 2024-05-09
Payer: COMMERCIAL

## 2024-05-09 VITALS — DIASTOLIC BLOOD PRESSURE: 68 MMHG | WEIGHT: 282 LBS | SYSTOLIC BLOOD PRESSURE: 112 MMHG | BODY MASS INDEX: 44.17 KG/M2

## 2024-05-09 DIAGNOSIS — O09.93 SUPERVISION OF HIGH RISK PREGNANCY IN THIRD TRIMESTER: Primary | ICD-10-CM

## 2024-05-09 DIAGNOSIS — Z91.89 AT RISK FOR COMPLICATION OF PREGNANCY: ICD-10-CM

## 2024-05-09 DIAGNOSIS — E66.01 MORBID OBESITY (H): ICD-10-CM

## 2024-05-09 DIAGNOSIS — E03.9 ACQUIRED HYPOTHYROIDISM: ICD-10-CM

## 2024-05-09 DIAGNOSIS — O09.899 SHORT INTERVAL BETWEEN PREGNANCIES AFFECTING PREGNANCY, ANTEPARTUM: ICD-10-CM

## 2024-05-09 LAB — HGB BLD-MCNC: 11.3 G/DL (ref 11.7–15.7)

## 2024-05-09 PROCEDURE — 85018 HEMOGLOBIN: CPT | Performed by: ADVANCED PRACTICE MIDWIFE

## 2024-05-09 PROCEDURE — 87653 STREP B DNA AMP PROBE: CPT | Performed by: ADVANCED PRACTICE MIDWIFE

## 2024-05-09 PROCEDURE — 99207 PR PRENATAL VISIT: CPT | Performed by: ADVANCED PRACTICE MIDWIFE

## 2024-05-09 PROCEDURE — 36415 COLL VENOUS BLD VENIPUNCTURE: CPT | Performed by: ADVANCED PRACTICE MIDWIFE

## 2024-05-10 LAB — GP B STREP DNA SPEC QL NAA+PROBE: NEGATIVE

## 2024-05-10 NOTE — PROGRESS NOTES
Samantha presents to the clinic with her daughter.  She is well today and offers no concerns.  GBS RV culture and hemoglobin today.  Baby is active, and she denies headache, visual disturbances, right upper quadrant abdominal pain, regular uterine contractions, loss of fluid or vaginal bleeding.  She does not endorse visual disturbances this visit.  Lab results reviewed from 5/3/2024 prenatal visit: All WNL (CBC, CMP, urine PC ratio, TSH and free T4).  BPP with MFM on 5/3/2024 8/8.  Next BPP scheduled for 2024 due to indication of pregravid BMI of 43.   labor precautions and danger signs and symptoms reviewed.  All questions answered.  Encouraged daily fetal movement counting and to call or return to clinic with any questions, concerns, or as needed.

## 2024-05-17 ENCOUNTER — PRENATAL OFFICE VISIT (OUTPATIENT)
Dept: MIDWIFE SERVICES | Facility: CLINIC | Age: 24
End: 2024-05-17
Payer: COMMERCIAL

## 2024-05-17 ENCOUNTER — ANCILLARY PROCEDURE (OUTPATIENT)
Dept: ULTRASOUND IMAGING | Facility: HOSPITAL | Age: 24
End: 2024-05-17
Attending: OBSTETRICS & GYNECOLOGY
Payer: COMMERCIAL

## 2024-05-17 ENCOUNTER — OFFICE VISIT (OUTPATIENT)
Dept: MATERNAL FETAL MEDICINE | Facility: HOSPITAL | Age: 24
End: 2024-05-17
Attending: OBSTETRICS & GYNECOLOGY
Payer: COMMERCIAL

## 2024-05-17 VITALS
OXYGEN SATURATION: 98 % | WEIGHT: 278 LBS | HEART RATE: 98 BPM | SYSTOLIC BLOOD PRESSURE: 110 MMHG | BODY MASS INDEX: 43.54 KG/M2 | DIASTOLIC BLOOD PRESSURE: 70 MMHG

## 2024-05-17 DIAGNOSIS — O99.213 OBESITY IN PREGNANCY, ANTEPARTUM, THIRD TRIMESTER: ICD-10-CM

## 2024-05-17 DIAGNOSIS — Z91.89 AT RISK FOR COMPLICATION OF PREGNANCY: ICD-10-CM

## 2024-05-17 DIAGNOSIS — F17.200 VAPING NICOTINE DEPENDENCE, NON-TOBACCO PRODUCT: ICD-10-CM

## 2024-05-17 DIAGNOSIS — O09.899 SHORT INTERVAL BETWEEN PREGNANCIES AFFECTING PREGNANCY, ANTEPARTUM: ICD-10-CM

## 2024-05-17 DIAGNOSIS — O09.93 SUPERVISION OF HIGH RISK PREGNANCY IN THIRD TRIMESTER: Primary | ICD-10-CM

## 2024-05-17 DIAGNOSIS — O99.213 OBESITY AFFECTING PREGNANCY IN THIRD TRIMESTER, UNSPECIFIED OBESITY TYPE: Primary | ICD-10-CM

## 2024-05-17 DIAGNOSIS — E03.9 ACQUIRED HYPOTHYROIDISM: ICD-10-CM

## 2024-05-17 DIAGNOSIS — E66.01 MORBID OBESITY (H): ICD-10-CM

## 2024-05-17 PROCEDURE — 76819 FETAL BIOPHYS PROFIL W/O NST: CPT

## 2024-05-17 PROCEDURE — 76819 FETAL BIOPHYS PROFIL W/O NST: CPT | Mod: 26 | Performed by: OBSTETRICS & GYNECOLOGY

## 2024-05-17 PROCEDURE — 99207 PR NO CHARGE LOS: CPT | Performed by: OBSTETRICS & GYNECOLOGY

## 2024-05-17 PROCEDURE — 99207 PR PRENATAL VISIT: CPT | Performed by: ADVANCED PRACTICE MIDWIFE

## 2024-05-17 NOTE — PROGRESS NOTES
Samantha presents to the clinic by herself.  MFM BPP scheduled later this afternoon.  GBS RV culture NEGATIVE and recent hemoglobin 11.3 g/dL.  Baby is active, and she denies headache, visual disturbances, right upper quadrant abdominal pain, regular uterine contractions, loss of fluid or vaginal bleeding.  Patient is VERY eager to welcome baby!  She desires IOL at 39+0 weeks (5/31/2024) to be scheduled at next return OB appointment.  Term labor precautions and danger signs and symptoms reviewed.  All questions answered.  Encouraged daily fetal movement counting and to call or return to clinic with any questions, concerns, or as needed.

## 2024-05-17 NOTE — NURSING NOTE
Samantha Cody is a  at 37w0d who presents to [facility] for a BPP. Pt reports positive fetal movement. Pt denies bldg/lof/change in discharge, contractions, headache, vision changes, chest pain/SOB or edema. SBAR given to Dr. Mcgee, see note in Epic.      Khadijah Blank RN

## 2024-05-24 ENCOUNTER — PRENATAL OFFICE VISIT (OUTPATIENT)
Dept: MIDWIFE SERVICES | Facility: CLINIC | Age: 24
End: 2024-05-24
Payer: COMMERCIAL

## 2024-05-24 ENCOUNTER — OFFICE VISIT (OUTPATIENT)
Dept: MATERNAL FETAL MEDICINE | Facility: HOSPITAL | Age: 24
End: 2024-05-24
Attending: STUDENT IN AN ORGANIZED HEALTH CARE EDUCATION/TRAINING PROGRAM
Payer: COMMERCIAL

## 2024-05-24 ENCOUNTER — ANCILLARY PROCEDURE (OUTPATIENT)
Dept: ULTRASOUND IMAGING | Facility: HOSPITAL | Age: 24
End: 2024-05-24
Attending: STUDENT IN AN ORGANIZED HEALTH CARE EDUCATION/TRAINING PROGRAM
Payer: COMMERCIAL

## 2024-05-24 VITALS — DIASTOLIC BLOOD PRESSURE: 64 MMHG | BODY MASS INDEX: 44.32 KG/M2 | WEIGHT: 283 LBS | SYSTOLIC BLOOD PRESSURE: 112 MMHG

## 2024-05-24 DIAGNOSIS — E03.9 ACQUIRED HYPOTHYROIDISM: ICD-10-CM

## 2024-05-24 DIAGNOSIS — O99.213 OBESITY IN PREGNANCY, ANTEPARTUM, THIRD TRIMESTER: ICD-10-CM

## 2024-05-24 DIAGNOSIS — O09.93 SUPERVISION OF HIGH RISK PREGNANCY IN THIRD TRIMESTER: Primary | ICD-10-CM

## 2024-05-24 DIAGNOSIS — Z87.448 HISTORY OF PYELONEPHRITIS: ICD-10-CM

## 2024-05-24 DIAGNOSIS — E66.01 OBESITY, CLASS III, BMI 40-49.9 (MORBID OBESITY) (H): Primary | ICD-10-CM

## 2024-05-24 DIAGNOSIS — O09.899 SHORT INTERVAL BETWEEN PREGNANCIES AFFECTING PREGNANCY, ANTEPARTUM: ICD-10-CM

## 2024-05-24 DIAGNOSIS — Z91.89 AT RISK FOR COMPLICATION OF PREGNANCY: ICD-10-CM

## 2024-05-24 DIAGNOSIS — E66.01 MORBID OBESITY (H): ICD-10-CM

## 2024-05-24 PROCEDURE — 99207 PR PRENATAL VISIT: CPT | Performed by: ADVANCED PRACTICE MIDWIFE

## 2024-05-24 PROCEDURE — 76819 FETAL BIOPHYS PROFIL W/O NST: CPT

## 2024-05-24 PROCEDURE — 76819 FETAL BIOPHYS PROFIL W/O NST: CPT | Mod: 26 | Performed by: STUDENT IN AN ORGANIZED HEALTH CARE EDUCATION/TRAINING PROGRAM

## 2024-05-24 NOTE — PROGRESS NOTES
Samantha presents to the clinic with her  Prashant.  Eager for baby's arrival!  She desires medical IOL for pregravid BMI of 43 at 39+0 weeks on Friday, May 31, 2024 at [facility] [place].  Induction opening time at 5 AM.  Patient desires SVE the morning of her induction to determine plan of care (whether cervical ripening is indicated or not).  We reviewed the indication for internal monitoring as needed.  Patient prefers wireless if possible ([employer]) and plans epidural anesthesia.  RAFY Booker, SURESH messaged regarding induction.  Baby is active, and she denies regular uterine contractions, loss of fluid, vaginal bleeding, headaches, visual disturbances or right upper quadrant abdominal pain.  M BPP scheduled later this afternoon at 3 PM.  Term labor precautions and danger signs and symptoms reviewed.  All questions answered.  Encouraged daily fetal movement counting and to call or return to clinic with any questions, concerns, or as needed.

## 2024-05-24 NOTE — PROGRESS NOTES
"Please see \"Imaging\" tab under \"Chart Review\" for details of today's visit.    Jeni Betancourt    "

## 2024-05-27 ENCOUNTER — APPOINTMENT (OUTPATIENT)
Dept: ULTRASOUND IMAGING | Facility: HOSPITAL | Age: 24
End: 2024-05-27
Attending: ADVANCED PRACTICE MIDWIFE
Payer: COMMERCIAL

## 2024-05-27 ENCOUNTER — HOSPITAL ENCOUNTER (OUTPATIENT)
Facility: HOSPITAL | Age: 24
Discharge: HOME | End: 2024-05-27
Attending: ADVANCED PRACTICE MIDWIFE | Admitting: ADVANCED PRACTICE MIDWIFE
Payer: COMMERCIAL

## 2024-05-27 VITALS
RESPIRATION RATE: 20 BRPM | HEIGHT: 67 IN | HEART RATE: 96 BPM | TEMPERATURE: 98.5 F | OXYGEN SATURATION: 96 % | BODY MASS INDEX: 44.32 KG/M2

## 2024-05-27 PROCEDURE — 76819 FETAL BIOPHYS PROFIL W/O NST: CPT

## 2024-05-27 RX ORDER — LANOLIN ALCOHOL/MO/W.PET/CERES
1000 CREAM (GRAM) TOPICAL DAILY
COMMUNITY

## 2024-05-27 RX ORDER — ACETAMINOPHEN 500 MG
500 TABLET ORAL EVERY 6 HOURS PRN
COMMUNITY

## 2024-05-27 ASSESSMENT — ACTIVITIES OF DAILY LIVING (ADL)
ADLS_ACUITY_SCORE: 18
ADLS_ACUITY_SCORE: 18

## 2024-05-27 NOTE — PROGRESS NOTES
"Outpatient/Triage Note:    Patient Name:  Samantha Cody  :      2000  MRN:      [id number]      Assessment:  NIHARIKA @ 38w3d here for evaluation of changes in fetal movement.  FHR Category 1  Reassuring NST  BPP   Not in labor      Plan:   - Discharge to home undelivered.   - Reviewed warning signs including decreased fetal movement, leaking of fluid, vaginal bleeding, or signs of labor.   - Reviewed how to contact on-call CNM.   - Follow-up in clinic with CNM as scheduled or sooner as needed.   -[place] [facility] [place]duction of [place]   - All questions answered. Agrees with plan.     Subjective:  Samantha Cody is a 23 year old  at 38 +3 weeks, presented to [facility] for evaluation of changes in fetal movement this afternoon. Patient reports an episode of feeling \"a lot\" of fetal movement for 5 straight minutes and then not feeling any movement after that time and become concerned. States this movement was unlike anything fetus had done before.   Denies leaking of fluid, bleeding, or regular uterine contractions.  Patient reports feeling normal movements after [place] [facility]     Objective:  Vital signs: Pulse 96   Temp 98.5  F (36.9  C) (Oral)   Resp 20   Ht 1.702 m (5' 7\")   LMP 2023 (Exact Date)   SpO2 96%   BMI 44.32 kg/m    FHR: Baseline 130, moderate variability, accelerations present, no decelerations   Uterine contractions: None  SVE: deferred      Temp:  [98.5  F (36.9  C)] 98.5  F (36.9  C)  Pulse:  [96] 96  Resp:  [20] 20  SpO2:  [96 %] 96 %  No intake or output data in the 24 hours ending 24 [id number]    Results:  Results for orders placed or performed during the hospital encounter of 24   US OB Fetal Biophys Prf wo NonStrs Singls Sgl     Status: None    Narrative    EXAM: US OB FETAL BIOPHY PROFILE W/O NST SINGLE W/LTD  LOCATION: [facility] [place]  DATE: 2024    INDICATION: decreased fetal " movement  COMPARISON: 5/24/2024    FINDINGS:  Single living fetus, cephalic presentation.    HEART RATE: 132 bpm.  SDP 6.21 cm.  PLACENTA: Anteriorly. No previa.  CERVIX: Appears complement.     2/2 fetal breathing  2/2 fetal movements  2/2 fetal tone  2/2 amniotic fluid    Total biophysical profile 8/8      Impression    IMPRESSION:  1.  Single living intrauterine gestation in cephalic presentation.  2.  Normal 8/8 biophysical profile.         Provider: RAFY Olguin, SURESH, REILLY

## 2024-05-27 NOTE — PROGRESS NOTES
"Samantha a  arrives to L&D reporting she had some weird fetal movement followed by decreased fetal movement. She was instructed to come and be evaluated. EFM applied at 1521. Lani PRINCE called about 1600 for update, notified of FH obtained encouraged pt to rotate to RS to try to obtain a better FH tracing, maternal pulse 90s, also notified that Samantha reports feeling \"really hot\", afebrile with first temperature check. Also, with Samantha moving to her RS she developed a look of pain on her face and stated her lower abdomen really hurt and then the pain subsided. Will continue to monitor pain. Samantha stated she thought she was having sandra gillespie contractions, but she states she didn't have much for contractions with her last child before receiving an epidural. Orders received for BPP, will call Lani with results.    "

## 2024-05-27 NOTE — PROVIDER NOTIFICATION
Lani PRINCE called results read from BPP,  moderate variability, accels present; no ctx. Per Lani Singh should keep any appointments she has this week and may discharge home.

## 2024-06-01 ENCOUNTER — HOSPITAL ENCOUNTER (INPATIENT)
Facility: CLINIC | Age: 24
LOS: 2 days | Discharge: HOME HEALTH | End: 2024-06-03
Attending: ADVANCED PRACTICE MIDWIFE | Admitting: ADVANCED PRACTICE MIDWIFE
Payer: COMMERCIAL

## 2024-06-01 PROBLEM — Z34.90 ENCOUNTER FOR PLANNED INDUCTION OF LABOR: Status: ACTIVE | Noted: 2024-06-01

## 2024-06-01 PROBLEM — O09.899 SHORT INTERVAL BETWEEN PREGNANCIES AFFECTING PREGNANCY, ANTEPARTUM: Status: ACTIVE | Noted: 2023-12-24

## 2024-06-01 LAB
ABO/RH(D): NORMAL
ANTIBODY SCREEN: NEGATIVE
HGB BLD-MCNC: 11 G/DL (ref 11.7–15.7)
SPECIMEN EXPIRATION DATE: NORMAL

## 2024-06-01 PROCEDURE — 120N000001 HC R&B MED SURG/OB

## 2024-06-01 PROCEDURE — 85018 HEMOGLOBIN: CPT | Performed by: ADVANCED PRACTICE MIDWIFE

## 2024-06-01 PROCEDURE — 86780 TREPONEMA PALLIDUM: CPT | Performed by: ADVANCED PRACTICE MIDWIFE

## 2024-06-01 PROCEDURE — 250N000013 HC RX MED GY IP 250 OP 250 PS 637: Performed by: ADVANCED PRACTICE MIDWIFE

## 2024-06-01 PROCEDURE — 36415 COLL VENOUS BLD VENIPUNCTURE: CPT | Performed by: ADVANCED PRACTICE MIDWIFE

## 2024-06-01 PROCEDURE — 86900 BLOOD TYPING SEROLOGIC ABO: CPT | Performed by: ADVANCED PRACTICE MIDWIFE

## 2024-06-01 RX ORDER — IBUPROFEN 800 MG/1
800 TABLET, FILM COATED ORAL
Status: DISCONTINUED | OUTPATIENT
Start: 2024-06-01 | End: 2024-06-03 | Stop reason: HOSPADM

## 2024-06-01 RX ORDER — OXYTOCIN/0.9 % SODIUM CHLORIDE 30/500 ML
340 PLASTIC BAG, INJECTION (ML) INTRAVENOUS CONTINUOUS PRN
Status: DISCONTINUED | OUTPATIENT
Start: 2024-06-01 | End: 2024-06-02 | Stop reason: HOSPADM

## 2024-06-01 RX ORDER — PROCHLORPERAZINE MALEATE 10 MG
10 TABLET ORAL EVERY 6 HOURS PRN
Status: DISCONTINUED | OUTPATIENT
Start: 2024-06-01 | End: 2024-06-02 | Stop reason: HOSPADM

## 2024-06-01 RX ORDER — PROCHLORPERAZINE MALEATE 10 MG
10 TABLET ORAL EVERY 6 HOURS PRN
Status: DISCONTINUED | OUTPATIENT
Start: 2024-06-01 | End: 2024-06-01

## 2024-06-01 RX ORDER — LEVOTHYROXINE SODIUM 50 UG/1
50 TABLET ORAL DAILY
Status: DISCONTINUED | OUTPATIENT
Start: 2024-06-02 | End: 2024-06-03 | Stop reason: HOSPADM

## 2024-06-01 RX ORDER — LOPERAMIDE HCL 2 MG
2 CAPSULE ORAL
Status: DISCONTINUED | OUTPATIENT
Start: 2024-06-01 | End: 2024-06-02 | Stop reason: HOSPADM

## 2024-06-01 RX ORDER — NALOXONE HYDROCHLORIDE 0.4 MG/ML
0.2 INJECTION, SOLUTION INTRAMUSCULAR; INTRAVENOUS; SUBCUTANEOUS
Status: DISCONTINUED | OUTPATIENT
Start: 2024-06-01 | End: 2024-06-01

## 2024-06-01 RX ORDER — OXYTOCIN 10 [USP'U]/ML
10 INJECTION, SOLUTION INTRAMUSCULAR; INTRAVENOUS
Status: DISCONTINUED | OUTPATIENT
Start: 2024-06-01 | End: 2024-06-03 | Stop reason: HOSPADM

## 2024-06-01 RX ORDER — KETOROLAC TROMETHAMINE 30 MG/ML
30 INJECTION, SOLUTION INTRAMUSCULAR; INTRAVENOUS
Status: DISCONTINUED | OUTPATIENT
Start: 2024-06-01 | End: 2024-06-03 | Stop reason: HOSPADM

## 2024-06-01 RX ORDER — CITRIC ACID/SODIUM CITRATE 334-500MG
30 SOLUTION, ORAL ORAL
Status: DISCONTINUED | OUTPATIENT
Start: 2024-06-01 | End: 2024-06-02 | Stop reason: HOSPADM

## 2024-06-01 RX ORDER — NALOXONE HYDROCHLORIDE 0.4 MG/ML
0.4 INJECTION, SOLUTION INTRAMUSCULAR; INTRAVENOUS; SUBCUTANEOUS
Status: DISCONTINUED | OUTPATIENT
Start: 2024-06-01 | End: 2024-06-02 | Stop reason: HOSPADM

## 2024-06-01 RX ORDER — PROCHLORPERAZINE 25 MG
25 SUPPOSITORY, RECTAL RECTAL EVERY 12 HOURS PRN
Status: DISCONTINUED | OUTPATIENT
Start: 2024-06-01 | End: 2024-06-02 | Stop reason: HOSPADM

## 2024-06-01 RX ORDER — ACETAMINOPHEN 325 MG/1
650 TABLET ORAL EVERY 4 HOURS PRN
Status: DISCONTINUED | OUTPATIENT
Start: 2024-06-01 | End: 2024-06-02 | Stop reason: HOSPADM

## 2024-06-01 RX ORDER — NALOXONE HYDROCHLORIDE 0.4 MG/ML
0.2 INJECTION, SOLUTION INTRAMUSCULAR; INTRAVENOUS; SUBCUTANEOUS
Status: DISCONTINUED | OUTPATIENT
Start: 2024-06-01 | End: 2024-06-02 | Stop reason: HOSPADM

## 2024-06-01 RX ORDER — CITRIC ACID/SODIUM CITRATE 334-500MG
30 SOLUTION, ORAL ORAL
Status: DISCONTINUED | OUTPATIENT
Start: 2024-06-01 | End: 2024-06-01

## 2024-06-01 RX ORDER — OXYTOCIN/0.9 % SODIUM CHLORIDE 30/500 ML
100-340 PLASTIC BAG, INJECTION (ML) INTRAVENOUS CONTINUOUS PRN
Status: DISCONTINUED | OUTPATIENT
Start: 2024-06-01 | End: 2024-06-01

## 2024-06-01 RX ORDER — ONDANSETRON 4 MG/1
4 TABLET, ORALLY DISINTEGRATING ORAL EVERY 6 HOURS PRN
Status: DISCONTINUED | OUTPATIENT
Start: 2024-06-01 | End: 2024-06-02 | Stop reason: HOSPADM

## 2024-06-01 RX ORDER — CARBOPROST TROMETHAMINE 250 UG/ML
250 INJECTION, SOLUTION INTRAMUSCULAR
Status: DISCONTINUED | OUTPATIENT
Start: 2024-06-01 | End: 2024-06-02 | Stop reason: HOSPADM

## 2024-06-01 RX ORDER — METOCLOPRAMIDE 10 MG/1
10 TABLET ORAL EVERY 6 HOURS PRN
Status: DISCONTINUED | OUTPATIENT
Start: 2024-06-01 | End: 2024-06-02 | Stop reason: HOSPADM

## 2024-06-01 RX ORDER — ONDANSETRON 4 MG/1
4 TABLET, ORALLY DISINTEGRATING ORAL EVERY 6 HOURS PRN
Status: DISCONTINUED | OUTPATIENT
Start: 2024-06-01 | End: 2024-06-01

## 2024-06-01 RX ORDER — METOCLOPRAMIDE 10 MG/1
10 TABLET ORAL EVERY 6 HOURS PRN
Status: DISCONTINUED | OUTPATIENT
Start: 2024-06-01 | End: 2024-06-01

## 2024-06-01 RX ORDER — MISOPROSTOL 100 UG/1
25 TABLET ORAL
Status: DISCONTINUED | OUTPATIENT
Start: 2024-06-01 | End: 2024-06-02 | Stop reason: HOSPADM

## 2024-06-01 RX ORDER — METHYLERGONOVINE MALEATE 0.2 MG/ML
200 INJECTION INTRAVENOUS
Status: DISCONTINUED | OUTPATIENT
Start: 2024-06-01 | End: 2024-06-02 | Stop reason: HOSPADM

## 2024-06-01 RX ORDER — MISOPROSTOL 200 UG/1
400 TABLET ORAL
Status: DISCONTINUED | OUTPATIENT
Start: 2024-06-01 | End: 2024-06-01

## 2024-06-01 RX ORDER — ONDANSETRON 2 MG/ML
4 INJECTION INTRAMUSCULAR; INTRAVENOUS EVERY 6 HOURS PRN
Status: DISCONTINUED | OUTPATIENT
Start: 2024-06-01 | End: 2024-06-02 | Stop reason: HOSPADM

## 2024-06-01 RX ORDER — OXYTOCIN/0.9 % SODIUM CHLORIDE 30/500 ML
100-340 PLASTIC BAG, INJECTION (ML) INTRAVENOUS CONTINUOUS PRN
Status: DISCONTINUED | OUTPATIENT
Start: 2024-06-01 | End: 2024-06-03 | Stop reason: HOSPADM

## 2024-06-01 RX ORDER — LOPERAMIDE HCL 2 MG
4 CAPSULE ORAL
Status: DISCONTINUED | OUTPATIENT
Start: 2024-06-01 | End: 2024-06-02 | Stop reason: HOSPADM

## 2024-06-01 RX ORDER — MISOPROSTOL 200 UG/1
800 TABLET ORAL
Status: DISCONTINUED | OUTPATIENT
Start: 2024-06-01 | End: 2024-06-02 | Stop reason: HOSPADM

## 2024-06-01 RX ORDER — NALOXONE HYDROCHLORIDE 0.4 MG/ML
0.4 INJECTION, SOLUTION INTRAMUSCULAR; INTRAVENOUS; SUBCUTANEOUS
Status: DISCONTINUED | OUTPATIENT
Start: 2024-06-01 | End: 2024-06-01

## 2024-06-01 RX ORDER — TRANEXAMIC ACID 10 MG/ML
1 INJECTION, SOLUTION INTRAVENOUS EVERY 30 MIN PRN
Status: DISCONTINUED | OUTPATIENT
Start: 2024-06-01 | End: 2024-06-01

## 2024-06-01 RX ORDER — OXYTOCIN 10 [USP'U]/ML
10 INJECTION, SOLUTION INTRAMUSCULAR; INTRAVENOUS
Status: DISCONTINUED | OUTPATIENT
Start: 2024-06-01 | End: 2024-06-01

## 2024-06-01 RX ORDER — TRANEXAMIC ACID 10 MG/ML
1 INJECTION, SOLUTION INTRAVENOUS EVERY 30 MIN PRN
Status: DISCONTINUED | OUTPATIENT
Start: 2024-06-01 | End: 2024-06-02 | Stop reason: HOSPADM

## 2024-06-01 RX ORDER — ONDANSETRON 2 MG/ML
4 INJECTION INTRAMUSCULAR; INTRAVENOUS EVERY 6 HOURS PRN
Status: DISCONTINUED | OUTPATIENT
Start: 2024-06-01 | End: 2024-06-01

## 2024-06-01 RX ORDER — METOCLOPRAMIDE HYDROCHLORIDE 5 MG/ML
10 INJECTION INTRAMUSCULAR; INTRAVENOUS EVERY 6 HOURS PRN
Status: DISCONTINUED | OUTPATIENT
Start: 2024-06-01 | End: 2024-06-02 | Stop reason: HOSPADM

## 2024-06-01 RX ORDER — MISOPROSTOL 200 UG/1
400 TABLET ORAL
Status: DISCONTINUED | OUTPATIENT
Start: 2024-06-01 | End: 2024-06-02 | Stop reason: HOSPADM

## 2024-06-01 RX ORDER — FENTANYL CITRATE 50 UG/ML
50 INJECTION, SOLUTION INTRAMUSCULAR; INTRAVENOUS EVERY 30 MIN PRN
Status: DISCONTINUED | OUTPATIENT
Start: 2024-06-01 | End: 2024-06-02 | Stop reason: HOSPADM

## 2024-06-01 RX ORDER — OXYTOCIN 10 [USP'U]/ML
10 INJECTION, SOLUTION INTRAMUSCULAR; INTRAVENOUS
Status: DISCONTINUED | OUTPATIENT
Start: 2024-06-01 | End: 2024-06-02 | Stop reason: HOSPADM

## 2024-06-01 RX ORDER — METHYLERGONOVINE MALEATE 0.2 MG/ML
200 INJECTION INTRAVENOUS
Status: DISCONTINUED | OUTPATIENT
Start: 2024-06-01 | End: 2024-06-01

## 2024-06-01 RX ORDER — CITRIC ACID/SODIUM CITRATE 334-500MG
30 SOLUTION, ORAL ORAL ONCE
Status: COMPLETED | OUTPATIENT
Start: 2024-06-01 | End: 2024-06-01

## 2024-06-01 RX ORDER — KETOROLAC TROMETHAMINE 30 MG/ML
30 INJECTION, SOLUTION INTRAMUSCULAR; INTRAVENOUS
Status: DISCONTINUED | OUTPATIENT
Start: 2024-06-01 | End: 2024-06-01

## 2024-06-01 RX ORDER — LOPERAMIDE HCL 2 MG
2 CAPSULE ORAL
Status: DISCONTINUED | OUTPATIENT
Start: 2024-06-01 | End: 2024-06-01

## 2024-06-01 RX ORDER — PROCHLORPERAZINE 25 MG
25 SUPPOSITORY, RECTAL RECTAL EVERY 12 HOURS PRN
Status: DISCONTINUED | OUTPATIENT
Start: 2024-06-01 | End: 2024-06-01

## 2024-06-01 RX ORDER — MISOPROSTOL 200 UG/1
800 TABLET ORAL
Status: DISCONTINUED | OUTPATIENT
Start: 2024-06-01 | End: 2024-06-01

## 2024-06-01 RX ORDER — METOCLOPRAMIDE HYDROCHLORIDE 5 MG/ML
10 INJECTION INTRAMUSCULAR; INTRAVENOUS EVERY 6 HOURS PRN
Status: DISCONTINUED | OUTPATIENT
Start: 2024-06-01 | End: 2024-06-01

## 2024-06-01 RX ORDER — OXYTOCIN/0.9 % SODIUM CHLORIDE 30/500 ML
340 PLASTIC BAG, INJECTION (ML) INTRAVENOUS CONTINUOUS PRN
Status: DISCONTINUED | OUTPATIENT
Start: 2024-06-01 | End: 2024-06-01

## 2024-06-01 RX ADMIN — MISOPROSTOL 25 MCG: 100 TABLET ORAL at 09:55

## 2024-06-01 RX ADMIN — MISOPROSTOL 25 MCG: 100 TABLET ORAL at 16:00

## 2024-06-01 RX ADMIN — MISOPROSTOL 25 MCG: 100 TABLET ORAL at 18:12

## 2024-06-01 RX ADMIN — MISOPROSTOL 25 MCG: 100 TABLET ORAL at 13:58

## 2024-06-01 RX ADMIN — MISOPROSTOL 25 MCG: 100 TABLET ORAL at 20:14

## 2024-06-01 RX ADMIN — MISOPROSTOL 25 MCG: 100 TABLET ORAL at 22:16

## 2024-06-01 RX ADMIN — MISOPROSTOL 25 MCG: 100 TABLET ORAL at 12:01

## 2024-06-01 ASSESSMENT — ACTIVITIES OF DAILY LIVING (ADL)
ADLS_ACUITY_SCORE: 18
ADLS_ACUITY_SCORE: 35
ADLS_ACUITY_SCORE: 18

## 2024-06-01 NOTE — H&P
/Date: 2024/  Time: 10:03 AM    Admission H&P  Samantha Cody,  2000, MRN [id number]    [facility]   Encounter for planned induction of labor    PCP: Ivana West, [phone]          Extended Emergency Contact Information  Primary Emergency Contact: gilberto cody  Address: [address] [place] [address] [place]  Home Phone: [phone]  Mobile Phone: [phone]  Relation: Mother  Secondary Emergency Contact: rowena cody  Address: [address] [place] [address] [facility]  Home Phone: [phone]  Mobile Phone: [phone]  Relation: Father       Chief Complaint: Encounter for planned induction of labor         HPI:      Samantha Cody, is a 23 year old,  at 39w1d, LMP 2023, Estimated Date of Delivery: 2024, confirmed by ultrasound at 7 weeks, admitted to [facility] on 2024 at 0900 for induction of labor due to pre-pregnancy BMI >40. She was diverted from [facility] for her induction.    Prenatal History:  Samantha Cody began care with [facility] at the  [facility] on 23 at 16 weeks gestation with regular care thereafter for a total of 10 visits. Her care was complicated by pre-pregnancy BMI 40, short interpregnancy interval, anemia in the third trimester .    Samantha Cody transferred care from [facility] on 23 at 16 weeks gestation after having started antepartum care on 10/27/23 at 8 weeks gestation. She had a total of 2 visits before transfer of care. Review of those records show the following concerns/problems: none.      Pre-pregnant weight:  278 lbs   Pre-pregnant BMI: 43.5    Total weight gain:  5 lbs    Labs:  Admission on 2024   Component Date Value Ref Range Status    Hemoglobin 2024 11.0 (L)  11.7 - 15.7 g/dL Final    ABO/RH(D) 2024 O POS   Preliminary    SPECIMEN  EXPIRATION DATE 06/01/2024 [id number]   Preliminary   Prenatal Office Visit on 05/09/2024   Component Date Value Ref Range Status    Group B Strep PCR 05/09/2024 Negative  Negative Final    Presumed negative for Streptococcus agalactiae (Group B Streptococcus) or the number of organisms may be below the limit of detection of the assay.    Hemoglobin 05/09/2024 11.3 (L)  11.7 - 15.7 g/dL Final   Prenatal Office Visit on 05/03/2024   Component Date Value Ref Range Status    Total Protein Urine mg/dL 05/03/2024 7.4    mg/dL Final    The reference ranges have not been established in urine protein. The results should be integrated into the clinical context for interpretation.    Total Protein Urine mg/mg Creat 05/03/2024 0.11  0.00 - 0.20 mg/mg Cr Final    Creatinine Urine mg/dL 05/03/2024 69.5  mg/dL Final    The reference ranges have not been established in urine creatinine. The results should be integrated into the clinical context for interpretation.    WBC Count 05/03/2024 7.6  4.0 - 11.0 10e3/uL Final    RBC Count 05/03/2024 4.69  3.80 - 5.20 10e6/uL Final    Hemoglobin 05/03/2024 11.6 (L)  11.7 - 15.7 g/dL Final    Hematocrit 05/03/2024 36.2  35.0 - 47.0 % Final    MCV 05/03/2024 77 (L)  78 - 100 fL Final    MCH 05/03/2024 24.7 (L)  26.5 - 33.0 pg Final    MCHC 05/03/2024 32.0  31.5 - 36.5 g/dL Final    RDW 05/03/2024 17.4 (H)  10.0 - 15.0 % Final    Platelet Count 05/03/2024 184  150 - 450 10e3/uL Final    Sodium 05/03/2024 137  135 - 145 mmol/L Final    Reference intervals for this test were updated on 09/26/2023 to more accurately reflect our healthy population. There may be differences in the flagging of prior results with similar values performed with this method. Interpretation of those prior results can be made in the context of the updated reference intervals.     Potassium 05/03/2024 4.4  3.4 - 5.3 mmol/L Final    Carbon Dioxide (CO2) 05/03/2024 21 (L)  22 - 29 mmol/L Final    Anion Gap 05/03/2024 11   7 - 15 mmol/L Final    Urea Nitrogen 05/03/2024 6.3  6.0 - 20.0 mg/dL Final    Creatinine 05/03/2024 0.61  0.51 - 0.95 mg/dL Final    GFR Estimate 05/03/2024 >90  >60 mL/min/1.73m2 Final    Calcium 05/03/2024 8.9  8.6 - 10.0 mg/dL Final    Chloride 05/03/2024 105  98 - 107 mmol/L Final    Glucose 05/03/2024 90  70 - 99 mg/dL Final    Alkaline Phosphatase 05/03/2024 117  40 - 150 U/L Final    Reference intervals for this test were updated on 11/14/2023 to more accurately reflect our healthy population. There may be differences in the flagging of prior results with similar values performed with this method. Interpretation of those prior results can be made in the context of the updated reference intervals.    AST 05/03/2024 11  0 - 45 U/L Final    Reference intervals for this test were updated on 6/12/2023 to more accurately reflect our healthy population. There may be differences in the flagging of prior results with similar values performed with this method. Interpretation of those prior results can be made in the context of the updated reference intervals.    ALT 05/03/2024 9  0 - 50 U/L Final    Reference intervals for this test were updated on 6/12/2023 to more accurately reflect our healthy population. There may be differences in the flagging of prior results with similar values performed with this method. Interpretation of those prior results can be made in the context of the updated reference intervals.      Protein Total 05/03/2024 6.4  6.4 - 8.3 g/dL Final    Albumin 05/03/2024 3.6  3.5 - 5.2 g/dL Final    Bilirubin Total 05/03/2024 0.2  <=1.2 mg/dL Final    TSH 05/03/2024 2.20  0.30 - 4.20 uIU/mL Final    Free T4 05/03/2024 0.96  0.90 - 1.70 ng/dL Final   Prenatal Office Visit on 04/11/2024   Component Date Value Ref Range Status    Culture 04/11/2024 <10,000 CFU/mL Mixture of Urogenital Gregoria   Final    Hemoglobin 04/11/2024 10.9 (L)  11.7 - 15.7 g/dL Final   Prenatal Office Visit on 03/21/2024   Component  Date Value Ref Range Status    WBC Count 03/21/2024 7.2  4.0 - 11.0 10e3/uL Final    RBC Count 03/21/2024 4.56  3.80 - 5.20 10e6/uL Final    Hemoglobin 03/21/2024 10.8 (L)  11.7 - 15.7 g/dL Final    Hematocrit 03/21/2024 34.4 (L)  35.0 - 47.0 % Final    MCV 03/21/2024 75 (L)  78 - 100 fL Final    MCH 03/21/2024 23.7 (L)  26.5 - 33.0 pg Final    MCHC 03/21/2024 31.4 (L)  31.5 - 36.5 g/dL Final    RDW 03/21/2024 18.4 (H)  10.0 - 15.0 % Final    Platelet Count 03/21/2024 199  150 - 450 10e3/uL Final    Treponema Antibody Total 03/21/2024 Nonreactive  Nonreactive Final    Iron 03/21/2024 296 (H)  37 - 145 ug/dL Final    Iron Binding Capacity 03/21/2024 438 (H)  240 - 430 ug/dL Final    Iron Sat Index 03/21/2024 68 (H)  15 - 46 % Final    Ferritin 03/21/2024 16  6 - 175 ng/mL Final    TSH 03/21/2024 2.29  0.30 - 4.20 uIU/mL Final    Free T4 03/21/2024 1.16  0.90 - 1.70 ng/dL Final   Prenatal Office Visit on 02/22/2024   Component Date Value Ref Range Status    Glu Gest Screen 1hr 50g 02/22/2024 88  70 - 129 mg/dL Final    Hemoglobin 02/22/2024 10.0 (L)  11.7 - 15.7 g/dL Final    TSH 02/22/2024 1.90  0.30 - 4.20 uIU/mL Final    Free T4 02/22/2024 1.06  0.90 - 1.70 ng/dL Final    Hold Specimen 02/22/2024 [facility]   Final   Prenatal Office Visit on 01/25/2024   Component Date Value Ref Range Status    Culture 01/25/2024 10,000-50,000 CFU/mL Mixture of urogenital timi   Final    TSH 01/25/2024 1.77  0.30 - 4.20 uIU/mL Final    Free T4 01/25/2024 1.06  0.90 - 1.70 ng/dL Final    Alpha Feto Protein 01/25/2024 43  ng/mL Final    MoM for AFP 01/25/2024 0.96   Final    AFP Interpretation 01/25/2024 Screen Neg   Final    INTERPRETATION: SCREEN NEGATIVE for open spina bifida                                Neural Tube Defects (NTD)   Negative                                                                 Pre-Test     Post-Test      Cutoff                                       Neural Tube Defects Risks   1:1030       < 1:63934    1:250                                           Comments:                                                   The risk of an open neural tube defect is less than the  screening cut-off.    This test was developed and its performance characteristics   determined by [facility]. It has not been cleared or   approved by the US Food and Drug Administration. This test   was performed in a CLIA certified laboratory and is   intended for clinical purposes.    Maternal Age at Delivery 01/25[age]2024 [age]  yr Final    Patient Weight 01/25/2024 277.0 lbs.   Final    Estimated Due Date 01/25/2024 06-07-24   Final    Gestational Age Calculated 01/25/2024 20 wks, 6 days   Final    Dating 01/25/2024 Other   Final    Num of Fetuses 01/25/2024 Castillo   Final    Maternal Race 01/25/2024 Nonblack   Final    Insulin Diabetic 01/25/2024 No   Final    Smoking Status 01/25/2024 No   Final    Fam History of NTD 01/25/2024 No   Final    Specimen Iteration 01/25/2024 See Note   Final    Initial sample  Performed By: [facility]  [address] [place] [id number]  : Tee Martinez MD, PhD  [facility] Number: [id number]   Allied Health/Nurse Visit on 11/15/2023   Component Date Value Ref Range Status    See Scanned Result 11/15/2023 INVITAE NON-INVASIVE PRENATAL SCREENING-Scanned   Final   Prenatal Office Visit on 10/27/2023   Component Date Value Ref Range Status    Chlamydia Trachomatis 10/27/2023 Negative  Negative Final    Negative for C. trachomatis rRNA by transcription mediated amplification.   A negative result by transcription mediated amplification does not preclude the presence of infection because results are dependent on proper and adequate collection, absence of inhibitors and sufficient rRNA to be detected.    Neisseria gonorrhoeae 10/27/2023 Negative  Negative Final    Negative for N. gonorrhoeae rRNA by transcription mediated amplification. A negative result by transcription mediated amplification  does not preclude the presence of C. trachomatis infection because results are dependent on proper and adequate collection, absence of inhibitors and sufficient rRNA to be detected.    Creatinine 10/27/2023 0.64  0.51 - 0.95 mg/dL Final    GFR Estimate 10/27/2023 >90  >60 mL/min/1.73m2 Final    Hepatitis B Surface Antibody Instr* 10/27/2023 0.45  <8.00 m[IU]/mL Final    Hepatitis B Surface Antibody 10/27/2023 Nonreactive   Final    No antibody detected when the value is less than 8.00 mIU/mL.    Hepatitis B Core Antibody Total 10/27/2023 Nonreactive  Nonreactive Final    Culture 10/27/2023 10,000-50,000 CFU/mL Mixture of urogenital timi   Final    Color Urine 10/27/2023 Yellow  Colorless, Straw, Light Yellow, Yellow Final    Appearance Urine 10/27/2023 Clear  Clear Final    Glucose Urine 10/27/2023 Negative  Negative mg/dL Final    Bilirubin Urine 10/27/2023 Negative  Negative Final    Ketones Urine 10/27/2023 Negative  Negative mg/dL Final    Specific Gravity Urine 10/27/2023 1.010  1.003 - 1.035 Final    Blood Urine 10/27/2023 Negative  Negative Final    pH Urine 10/27/2023 7.0  5.0 - 7.0 Final    Protein Albumin Urine 10/27/2023 Negative  Negative mg/dL Final    Urobilinogen Urine 10/27/2023 0.2  0.2, 1.0 E.U./dL Final    Nitrite Urine 10/27/2023 Negative  Negative Final    Leukocyte Esterase Urine 10/27/2023 Small (A)  Negative Final    Hepatitis C Antibody 10/27/2023 Nonreactive  Nonreactive Final    Treponema Antibody Total 10/27/2023 Nonreactive  Nonreactive Final    HIV Antigen Antibody Combo 10/27/2023 Nonreactive  Nonreactive Final    HIV-1 p24 Ag & HIV-1/HIV-2 Ab Not Detected    Rubella Kim IgG Instrument Value 10/27/2023 3.03  <0.90 Index Final    Rubella Antibody IgG 10/27/2023 Positive   Final    Suggests previous exposure or immunization and probable immunity.    Hepatitis B Surface Antigen 10/27/2023 Nonreactive  Nonreactive Final    WBC Count 10/27/2023 6.6  4.0 - 11.0 10e3/uL Final    RBC Count  10/27/2023 4.60  3.80 - 5.20 10e6/uL Final    Hemoglobin 10/27/2023 9.8 (L)  11.7 - 15.7 g/dL Final    Hematocrit 10/27/2023 33.0 (L)  35.0 - 47.0 % Final    MCV 10/27/2023 72 (L)  78 - 100 fL Final    MCH 10/27/2023 21.3 (L)  26.5 - 33.0 pg Final    MCHC 10/27/2023 29.7 (L)  31.5 - 36.5 g/dL Final    RDW 10/27/2023 17.1 (H)  10.0 - 15.0 % Final    Platelet Count 10/27/2023 242  150 - 450 10e3/uL Final    TSH 10/27/2023 2.94  0.30 - 4.20 uIU/mL Final    Total Protein Urine mg/dL 10/27/2023 4.6    mg/dL Final    The reference ranges have not been established in urine protein. The results should be integrated into the clinical context for interpretation.    Total Protein Urine mg/mg Creat 10/27/2023 0.08  0.00 - 0.20 mg/mg Cr Final    Creatinine Urine mg/dL 10/27/2023 61.2  mg/dL Final    The reference ranges have not been established in urine creatinine. The results should be integrated into the clinical context for interpretation.    AST 10/27/2023 14  0 - 45 U/L Final    Reference intervals for this test were updated on 6/12/2023 to more accurately reflect our healthy population. There may be differences in the flagging of prior results with similar values performed with this method. Interpretation of those prior results can be made in the context of the updated reference intervals.    ALT 10/27/2023 12  0 - 50 U/L Final    Reference intervals for this test were updated on 6/12/2023 to more accurately reflect our healthy population. There may be differences in the flagging of prior results with similar values performed with this method. Interpretation of those prior results can be made in the context of the updated reference intervals.      Hemoglobin A1C 10/27/2023 5.1  0.0 - 5.6 % Final    Normal <5.7%   Prediabetes 5.7-6.4%    Diabetes 6.5% or higher     Note: Adopted from ADA consensus guidelines.    ABO/RH(D) 10/27/2023 O POS   Final    Antibody Screen 10/27/2023 Negative  Negative Final    SPECIMEN EXPIRATION  DATE 10/27/2023 [id number]   Final    Bacteria Urine 10/27/2023 Few (A)  None Seen /HPF Final    RBC Urine 10/27/2023 0-2  0-2 /HPF /HPF Final    WBC Urine 10/27/2023 0-5  0-5 /HPF /HPF Final    Squamous Epithelials Urine 10/27/2023 Moderate (A)  None Seen /LPF Final    Iron 10/27/2023 24 (L)  37 - 145 ug/dL Final    Iron Binding Capacity 10/27/2023 341  240 - 430 ug/dL Final    Iron Sat Index 10/27/2023 7 (L)  15 - 46 % Final    Ferritin 10/27/2023 7  6 - 175 ng/mL Final       Pertinent Radiology:    Ultrasound on 2023 Same Rola Perez, RN 2024   GA: 16w0d  Comment: unable to auscultate doptones so accepted ultrasound for viability   Ultrasound on 10/27/2023 06/10/2024 -3d Rola Perez RN 2024   GA: 7w4d  Comment: Viable S IUP.  bpm. MARGI WNL. Placenta anterior.   Ultrasound on 03/15/2024 2024 +1d Jazzmine Mercedes CNM 2024   GA: 28w1d  Comment: [facility] US: Viable IUP,  bpm..  Placenta ANTERIOR, MARGI WNL.  EFW: 1182 g / 2 pounds 10 ounces / 43%, AC 37%   Ultrasound on 2024 +4d Jazzmine Mercedes CNM 2024   GA: 34w4d  Comment: [facility]: BPP 8/8.   bpm.  CEPHALIC presentation and MARGI WNL.  EFW: 2406 g / 5 pounds 5 ounces / 54%, AC 66%   Ultrasound on 2024 Jazzmine Sevilla CNM 2024   GA: 37w0d  Comment: [facility] BPP 8/8,  bpm, cephalic presentation       OB HISTORY  OB History    Para Term  AB Living   2 1 1 0 0 1   SAB IAB Ectopic Multiple Live Births   0 0 0 0 1      # Outcome Date GA Lbr Joshua/2nd Weight Sex Type Anes PTL Lv   2 Current            1 Term 23 39w2d 05:49 / 03:21 3.459 kg (7 lb 10 oz) F Vag-Spont EPI N FRANCIS      Name: Rochelle      Apgar1: 9  Apgar5: 9         Medical History  Past Medical History:   Diagnosis Date    VALENTINO (acute kidney injury) (H24)     Anemia due to blood loss, acute     Hypothyroidism     Pyelonephritis     Sepsis (H)          Surgical  History  She  has a past surgical history that includes no history of surgery and [place] [facility].       Social History  Reviewed, and she  reports that she has been smoking vaping device. She has never been exposed to tobacco smoke. She has never used smokeless tobacco. She reports that she does not currently use alcohol. She reports that she does not use drugs.  Partner: Prashant  Education level: high school graduate  Occupation: [employer]      Family History  Reviewed, and family history includes Gynecology in an other family member; Hypertension in her father and mother; Thyroid Disease in her paternal grandmother.          No Known Allergies   Medications Prior to Admission   Medication Sig Dispense Refill Last Dose    acetaminophen (TYLENOL) 500 MG tablet Take 500 mg by mouth every 6 hours as needed for mild pain   Past Week    ascorbic acid (VITAMIN C) 250 MG CHEW chewable tablet Take 1 tablet (250 mg) by mouth daily Take with iron supplement. 90 tablet 1 5/31/2024    aspirin (ASA) 81 MG chewable tablet Take 1 tablet (81 mg) by mouth daily 90 tablet 3 Past Week    cyanocobalamin (VITAMIN B-12) 1000 MCG tablet Take 1,000 mcg by mouth daily   5/31/2024    ferrous sulfate (FEROSUL) 325 (65 Fe) MG tablet Take 1 tablet (325 mg) by mouth daily (with breakfast) 90 tablet 1 5/31/2024    levothyroxine (SYNTHROID/LEVOTHROID) 50 MCG tablet Take 1 tablet (50 mcg) by mouth daily 90 tablet 0 6/1/2024    ondansetron (ZOFRAN ODT) 4 MG ODT tab Take 1 tablet (4 mg) by mouth every 8 hours as needed for nausea 30 tablet 1 Past Week    Prenatal Vit-Fe Fumarate-FA (PRENATAL MULTIVITAMIN  PLUS IRON) 27-1 MG TABS Take 1 tablet by mouth daily   5/31/2024        Review of Systems:  Pertinent items are noted in HPI.   Physical Exam:  Temp:  [98  F (36.7  C)] 98  F (36.7  C)  Resp:  [18] 18  BP: (128)/(85) 128/85  SpO2:  [99 %] 99 %    /85 (BP Location: Left arm, Patient Position: Semi-Spain's, Cuff Size: Adult Regular)    "Temp 98  F (36.7  C) (Oral)   Resp 18   LMP 09/01/2023 (Exact Date)   SpO2 99%     Height: 5' 7\"  General appearance:  comfortable  Psych: AAO x3  Skin: Pink, warm & dry  HEENT: unremarkable  Cardiovascular:  RRR, S1, S2, no extra sounds or murmurs  Respiratory:  breath sounds CTA bilaterally, anteriorly and posteriorly  Abdomen: soft, NT, gravid  Leopolds: vertex         EFW: 7lbs 8oz     FHR:Baseline: 130 bpm, Variability: Moderate (6 - 25 bpm), Accelerations: present and Decelerations: Absent    Uterine contractions: Frequency: none on monitor or by pt report, possible irritabilit; soft resting tone    SVE:Dilation of Cervix:  0        Score = 0  Effacement:  40-50%;   Score = 1  Consistency:  Soft;   Score = 2  Position:  Posterior;   Score = 0  Station:  -2;   Score = 1    Extremeties: no edema        Membrane Status: intact        Notable AP/IP factors to date:  Patient Active Problem List   Diagnosis    Multiple joint pain    BMI 43    Acquired hypothyroidism    Supervision of high risk pregnancy in third trimester    At risk for complication of pregnancy    Short interval between pregnancies affecting pregnancy, antepartum    History of pyelonephritis    Genetic screening    Vaping nicotine dependence, non-tobacco product    Anemia affecting pregnancy in third trimester    Encounter for planned induction of labor       Impression:  Samantha BUTCHER Escobar is a 23 year old year old at 39w1d weeks, Category 1 FHTs with reactive NST.  Medical IOL for prepregnancy BMI 43   GBS negative/Rh positive  Anemia  Hypothyroidism  Short interpregnancy interval  Cardona score 4     Plan:  - [facility]  -  Encourage position changes  -  Labor support PRN.  - Reviewed plan for cervical ripening and iven options of PO vs PV cytotec or cervidil. Samantha elects PO cytotec at this time. Reviewed possibility of placing cervical balloon once cervix is starting to dilate  - Continuous fetal monitoring  -  " Anticipate progress and spontaneous vaginal birth    Total time with patient:  30 minutes at bedside and in the [facility] obtaining and clarifying the above history, performing the physical exam, education and counseling and developing this plan of care.  >50% spent on counseling and coordination of care.    Provider: RAFY Cabrera CNM, RAFY, SURESH

## 2024-06-01 NOTE — PROGRESS NOTES
Admitted the patient to the unit. Overall, patient is feeling good today. Vitals stable. IV was placed. Hgb stable at 11.0.     SVE per CNM, 0/50/-2. Cardona; 4.     Cytotec started at 1000. Plan for next dose at 1200.    FHR baseline; 130 bpm. Moderate variability. Accelerations present.      Uterine contractions; irregular.   Rates cramping pain 2/10. Resting comfortably.     Labor education initialed, answered any questions, and provided reassurance.     Frequent movement and position changes encouraged. Birthing ball at bedside. Waiting for a tele monitor to become available so she can go on a walk.     RADHA Nagel

## 2024-06-01 NOTE — PROGRESS NOTES
Patient still resting comfortably; rates contraction pain 4/10. Soha about every 1-5 minutes.     FHR baseline; 135 bpm. Moderate variability. Accelerations present. Deceleratons; absent.    Next dose of Cytotec due at 1600.      Encouraged frequent position changes.

## 2024-06-02 ENCOUNTER — ANESTHESIA (OUTPATIENT)
Dept: OBGYN | Facility: CLINIC | Age: 24
End: 2024-06-02
Payer: COMMERCIAL

## 2024-06-02 ENCOUNTER — ANESTHESIA EVENT (OUTPATIENT)
Dept: OBGYN | Facility: CLINIC | Age: 24
End: 2024-06-02
Payer: COMMERCIAL

## 2024-06-02 LAB
CREAT SERPL-MCNC: 0.66 MG/DL (ref 0.51–0.95)
EGFRCR SERPLBLD CKD-EPI 2021: >90 ML/MIN/1.73M2
PLATELET # BLD AUTO: 178 10E3/UL (ref 150–450)
T PALLIDUM AB SER QL: NONREACTIVE

## 2024-06-02 PROCEDURE — 250N000013 HC RX MED GY IP 250 OP 250 PS 637: Performed by: ADVANCED PRACTICE MIDWIFE

## 2024-06-02 PROCEDURE — 250N000009 HC RX 250: Performed by: MIDWIFE

## 2024-06-02 PROCEDURE — 10907ZC DRAINAGE OF AMNIOTIC FLUID, THERAPEUTIC FROM PRODUCTS OF CONCEPTION, VIA NATURAL OR ARTIFICIAL OPENING: ICD-10-PCS | Performed by: ADVANCED PRACTICE MIDWIFE

## 2024-06-02 PROCEDURE — 82565 ASSAY OF CREATININE: CPT | Performed by: MIDWIFE

## 2024-06-02 PROCEDURE — 120N000001 HC R&B MED SURG/OB

## 2024-06-02 PROCEDURE — 36415 COLL VENOUS BLD VENIPUNCTURE: CPT | Performed by: MIDWIFE

## 2024-06-02 PROCEDURE — 59400 OBSTETRICAL CARE: CPT | Performed by: MIDWIFE

## 2024-06-02 PROCEDURE — 250N000011 HC RX IP 250 OP 636: Performed by: ADVANCED PRACTICE MIDWIFE

## 2024-06-02 PROCEDURE — 85049 AUTOMATED PLATELET COUNT: CPT | Performed by: MIDWIFE

## 2024-06-02 PROCEDURE — 3E0R3BZ INTRODUCTION OF ANESTHETIC AGENT INTO SPINAL CANAL, PERCUTANEOUS APPROACH: ICD-10-PCS | Performed by: ANESTHESIOLOGY

## 2024-06-02 PROCEDURE — 258N000003 HC RX IP 258 OP 636: Performed by: MIDWIFE

## 2024-06-02 PROCEDURE — 722N000001 HC LABOR CARE VAGINAL DELIVERY SINGLE

## 2024-06-02 PROCEDURE — 250N000009 HC RX 250: Performed by: ANESTHESIOLOGY

## 2024-06-02 PROCEDURE — 370N000003 HC ANESTHESIA WARD SERVICE: Performed by: ANESTHESIOLOGY

## 2024-06-02 PROCEDURE — 3E033VJ INTRODUCTION OF OTHER HORMONE INTO PERIPHERAL VEIN, PERCUTANEOUS APPROACH: ICD-10-PCS | Performed by: ADVANCED PRACTICE MIDWIFE

## 2024-06-02 PROCEDURE — 00HU33Z INSERTION OF INFUSION DEVICE INTO SPINAL CANAL, PERCUTANEOUS APPROACH: ICD-10-PCS | Performed by: ANESTHESIOLOGY

## 2024-06-02 PROCEDURE — 250N000011 HC RX IP 250 OP 636: Performed by: ANESTHESIOLOGY

## 2024-06-02 RX ORDER — DOCUSATE SODIUM 100 MG/1
100 CAPSULE, LIQUID FILLED ORAL DAILY
Status: DISCONTINUED | OUTPATIENT
Start: 2024-06-02 | End: 2024-06-03 | Stop reason: HOSPADM

## 2024-06-02 RX ORDER — LOPERAMIDE HCL 2 MG
2 CAPSULE ORAL
Status: DISCONTINUED | OUTPATIENT
Start: 2024-06-02 | End: 2024-06-03 | Stop reason: HOSPADM

## 2024-06-02 RX ORDER — SODIUM CHLORIDE, SODIUM LACTATE, POTASSIUM CHLORIDE, CALCIUM CHLORIDE 600; 310; 30; 20 MG/100ML; MG/100ML; MG/100ML; MG/100ML
INJECTION, SOLUTION INTRAVENOUS CONTINUOUS PRN
Status: DISCONTINUED | OUTPATIENT
Start: 2024-06-02 | End: 2024-06-02 | Stop reason: HOSPADM

## 2024-06-02 RX ORDER — FENTANYL CITRATE-0.9 % NACL/PF 10 MCG/ML
100 PLASTIC BAG, INJECTION (ML) INTRAVENOUS EVERY 5 MIN PRN
Status: DISCONTINUED | OUTPATIENT
Start: 2024-06-02 | End: 2024-06-02 | Stop reason: HOSPADM

## 2024-06-02 RX ORDER — LOPERAMIDE HCL 2 MG
4 CAPSULE ORAL
Status: DISCONTINUED | OUTPATIENT
Start: 2024-06-02 | End: 2024-06-03 | Stop reason: HOSPADM

## 2024-06-02 RX ORDER — CARBOPROST TROMETHAMINE 250 UG/ML
250 INJECTION, SOLUTION INTRAMUSCULAR
Status: DISCONTINUED | OUTPATIENT
Start: 2024-06-02 | End: 2024-06-03 | Stop reason: HOSPADM

## 2024-06-02 RX ORDER — ONDANSETRON 2 MG/ML
INJECTION INTRAMUSCULAR; INTRAVENOUS
Status: COMPLETED
Start: 2024-06-02 | End: 2024-06-02

## 2024-06-02 RX ORDER — LIDOCAINE HYDROCHLORIDE AND EPINEPHRINE 15; 5 MG/ML; UG/ML
INJECTION, SOLUTION EPIDURAL PRN
Status: DISCONTINUED | OUTPATIENT
Start: 2024-06-02 | End: 2024-06-02

## 2024-06-02 RX ORDER — FENTANYL/ROPIVACAINE/NS/PF 2MCG/ML-.1
PLASTIC BAG, INJECTION (ML) EPIDURAL
Status: DISCONTINUED | OUTPATIENT
Start: 2024-06-02 | End: 2024-06-02 | Stop reason: HOSPADM

## 2024-06-02 RX ORDER — OXYTOCIN/0.9 % SODIUM CHLORIDE 30/500 ML
1-24 PLASTIC BAG, INJECTION (ML) INTRAVENOUS CONTINUOUS
Status: DISCONTINUED | OUTPATIENT
Start: 2024-06-02 | End: 2024-06-02 | Stop reason: HOSPADM

## 2024-06-02 RX ORDER — HYDROCORTISONE 25 MG/G
CREAM TOPICAL 3 TIMES DAILY PRN
Status: DISCONTINUED | OUTPATIENT
Start: 2024-06-02 | End: 2024-06-03 | Stop reason: HOSPADM

## 2024-06-02 RX ORDER — OXYTOCIN 10 [USP'U]/ML
10 INJECTION, SOLUTION INTRAMUSCULAR; INTRAVENOUS
Status: DISCONTINUED | OUTPATIENT
Start: 2024-06-02 | End: 2024-06-03 | Stop reason: HOSPADM

## 2024-06-02 RX ORDER — LIDOCAINE 40 MG/G
CREAM TOPICAL
Status: DISCONTINUED | OUTPATIENT
Start: 2024-06-02 | End: 2024-06-02 | Stop reason: HOSPADM

## 2024-06-02 RX ORDER — IBUPROFEN 800 MG/1
800 TABLET, FILM COATED ORAL EVERY 6 HOURS PRN
Status: DISCONTINUED | OUTPATIENT
Start: 2024-06-02 | End: 2024-06-03 | Stop reason: HOSPADM

## 2024-06-02 RX ORDER — MODIFIED LANOLIN
OINTMENT (GRAM) TOPICAL
Status: DISCONTINUED | OUTPATIENT
Start: 2024-06-02 | End: 2024-06-03 | Stop reason: HOSPADM

## 2024-06-02 RX ORDER — METHYLERGONOVINE MALEATE 0.2 MG/ML
200 INJECTION INTRAVENOUS
Status: DISCONTINUED | OUTPATIENT
Start: 2024-06-02 | End: 2024-06-03 | Stop reason: HOSPADM

## 2024-06-02 RX ORDER — TRANEXAMIC ACID 10 MG/ML
1 INJECTION, SOLUTION INTRAVENOUS EVERY 30 MIN PRN
Status: DISCONTINUED | OUTPATIENT
Start: 2024-06-02 | End: 2024-06-03 | Stop reason: HOSPADM

## 2024-06-02 RX ORDER — MISOPROSTOL 200 UG/1
400 TABLET ORAL
Status: DISCONTINUED | OUTPATIENT
Start: 2024-06-02 | End: 2024-06-03 | Stop reason: HOSPADM

## 2024-06-02 RX ORDER — BISACODYL 10 MG
10 SUPPOSITORY, RECTAL RECTAL DAILY PRN
Status: DISCONTINUED | OUTPATIENT
Start: 2024-06-02 | End: 2024-06-03 | Stop reason: HOSPADM

## 2024-06-02 RX ORDER — MISOPROSTOL 200 UG/1
800 TABLET ORAL
Status: DISCONTINUED | OUTPATIENT
Start: 2024-06-02 | End: 2024-06-03 | Stop reason: HOSPADM

## 2024-06-02 RX ORDER — ONDANSETRON 2 MG/ML
4 INJECTION INTRAMUSCULAR; INTRAVENOUS EVERY 6 HOURS PRN
Status: DISCONTINUED | OUTPATIENT
Start: 2024-06-02 | End: 2024-06-03 | Stop reason: HOSPADM

## 2024-06-02 RX ORDER — ENOXAPARIN SODIUM 100 MG/ML
40 INJECTION SUBCUTANEOUS EVERY 12 HOURS
Status: DISCONTINUED | OUTPATIENT
Start: 2024-06-02 | End: 2024-06-03 | Stop reason: HOSPADM

## 2024-06-02 RX ORDER — NALBUPHINE HYDROCHLORIDE 20 MG/ML
2.5-5 INJECTION, SOLUTION INTRAMUSCULAR; INTRAVENOUS; SUBCUTANEOUS EVERY 6 HOURS PRN
Status: DISCONTINUED | OUTPATIENT
Start: 2024-06-02 | End: 2024-06-03 | Stop reason: HOSPADM

## 2024-06-02 RX ORDER — ACETAMINOPHEN 325 MG/1
650 TABLET ORAL EVERY 4 HOURS PRN
Status: DISCONTINUED | OUTPATIENT
Start: 2024-06-02 | End: 2024-06-03 | Stop reason: HOSPADM

## 2024-06-02 RX ORDER — OXYTOCIN/0.9 % SODIUM CHLORIDE 30/500 ML
340 PLASTIC BAG, INJECTION (ML) INTRAVENOUS CONTINUOUS PRN
Status: DISCONTINUED | OUTPATIENT
Start: 2024-06-02 | End: 2024-06-03 | Stop reason: HOSPADM

## 2024-06-02 RX ADMIN — MISOPROSTOL 25 MCG: 100 TABLET ORAL at 06:13

## 2024-06-02 RX ADMIN — MISOPROSTOL 25 MCG: 100 TABLET ORAL at 04:13

## 2024-06-02 RX ADMIN — MISOPROSTOL 25 MCG: 100 TABLET ORAL at 02:13

## 2024-06-02 RX ADMIN — LIDOCAINE HYDROCHLORIDE,EPINEPHRINE BITARTRATE 3 ML: 15; .005 INJECTION, SOLUTION EPIDURAL; INFILTRATION; INTRACAUDAL; PERINEURAL at 10:26

## 2024-06-02 RX ADMIN — SODIUM CHLORIDE, POTASSIUM CHLORIDE, SODIUM LACTATE AND CALCIUM CHLORIDE: 600; 310; 30; 20 INJECTION, SOLUTION INTRAVENOUS at 08:17

## 2024-06-02 RX ADMIN — ONDANSETRON 4 MG: 2 INJECTION INTRAMUSCULAR; INTRAVENOUS at 18:14

## 2024-06-02 RX ADMIN — Medication 4 MILLI-UNITS/MIN: at 08:17

## 2024-06-02 RX ADMIN — LIDOCAINE HYDROCHLORIDE,EPINEPHRINE BITARTRATE 2 ML: 15; .005 INJECTION, SOLUTION EPIDURAL; INFILTRATION; INTRACAUDAL; PERINEURAL at 10:27

## 2024-06-02 RX ADMIN — MISOPROSTOL 25 MCG: 100 TABLET ORAL at 00:13

## 2024-06-02 RX ADMIN — LEVOTHYROXINE SODIUM 50 MCG: 0.05 TABLET ORAL at 07:29

## 2024-06-02 RX ADMIN — Medication: at 10:32

## 2024-06-02 ASSESSMENT — ACTIVITIES OF DAILY LIVING (ADL)
ADLS_ACUITY_SCORE: 18

## 2024-06-02 NOTE — ANESTHESIA PROCEDURE NOTES
Epidural catheter Procedure Note    Pre-Procedure   Staff -        Anesthesiologist:  Wisam Tang MD       Performed By: anesthesiologist       Location: OB       Procedure Start/Stop Times: 6/2/2024 10:20 AM and 6/2/2024 10:28 AM       Pre-Anesthestic Checklist: patient identified, IV checked, risks and benefits discussed, informed consent, monitors and equipment checked, pre-op evaluation, at physician/surgeon's request and post-op pain management  Timeout:       Correct Patient: Yes        Correct Procedure: Yes        Correct Site: Yes        Correct Position: Yes   Procedure Documentation  Procedure: epidural catheter       Patient Position: sitting       Patient Prep/Sterile Barriers: sterile gloves, mask       Skin prep: Chloraprep       Local skin infiltrated with 3 mL of 1% lidocaine.        Insertion Site: L3-4. (midline approach).       Technique: LORT air        BOUCHRA at 8 cm.       Needle type: [employer].       Needle Gauge: 18.        Needle Length (Inches): 3.5        Catheter: 20 G.          Catheter threaded easily.         5.5 cm epidural space.         Threaded 13.5 cm at skin.         # of attempts: 1 and  # of redirects:  0    Assessment/Narrative         Paresthesias: No.       Test dose of 3 mL lidocaine 1.5% w/ 1:200,000 epinephrine at.         Test dose negative, 3 minutes after injection, for signs of intravascular, subdural, or intrathecal injection.       Insertion/Infusion Method: LORT air       No aspiration negative for Heme or CSF via Epidural Catheter.    Medication(s) Administered   Medication Administration Time: 6/2/2024 10:20 AM     Comments:  Single pass epidural. Crisp loss of resistance at 8 cm. Catheter threaded easily 5.5 cm into the epidural space. Negative aspiration for heme and CSF. Negative test dose. No signs of LAST. No pain or paresthesias upon placement. No complications.    Prior to leaving room, patient reported much improved pain control.        FOR [facility]  "([facility]) ONLY:   Pain Team Contact information: please page the Pain Team Via [employer]. Search \"Pain\". During daytime hours, please page the attending first. At night please page the resident first.      "

## 2024-06-02 NOTE — PLAN OF CARE
Goal Outcome Evaluation: kirk Singh is a  who delivered vaginally at 1127 today.  Delivery QBL was 300.  She has had light to moderate bleeding since delivery. VSS.   Planning to get up to take to the bathroom and evaluate bleeding in next 10 minutes.  Will continue to monitor.

## 2024-06-02 NOTE — ANESTHESIA PREPROCEDURE EVALUATION
Anesthesia Pre-Procedure Evaluation    Patient: Samantha Cody   MRN: [id number] : 2000        Procedure :           Past Medical History:   Diagnosis Date    VALENTINO (acute kidney injury) (H24)     Anemia due to blood loss, acute     Hypothyroidism     Pyelonephritis     Sepsis (H)       Past Surgical History:   Procedure Laterality Date    NO HISTORY OF SURGERY      WISDOM TOOTH EXTRACTION      2019      No Known Allergies   Social History     Tobacco Use    Smoking status: Every Day     Types: Vaping Device     Passive exposure: Never    Smokeless tobacco: Never    Tobacco comments:     cutting down with pregnancy   Substance Use Topics    Alcohol use: Not Currently     Comment: occas-quit with pregnancy      Wt Readings from Last 1 Encounters:   24 128.4 kg (283 lb)        Anesthesia Evaluation   Pt has had prior anesthetic. Type: OB Labor Epidural.    No history of anesthetic complications       ROS/MED HX  ENT/Pulmonary:  - neg pulmonary ROS     Neurologic:  - neg neurologic ROS     Cardiovascular:  - neg cardiovascular ROS     METS/Exercise Tolerance:     Hematologic:     (+)      anemia,          Musculoskeletal:       GI/Hepatic:  - neg GI/hepatic ROS     Renal/Genitourinary:       Endo:     (+)          thyroid problem, hypothyroidism,    Obesity,       Psychiatric/Substance Use:  - neg psychiatric ROS     Infectious Disease:       Malignancy:       Other:            Physical Exam    Airway        Mallampati: III   TM distance: > 3 FB   Neck ROM: full   Mouth opening: > 3 cm    Respiratory Devices and Support         Dental  no notable dental history         Cardiovascular   cardiovascular exam normal          Pulmonary   pulmonary exam normal                OUTSIDE LABS:  CBC:   Lab Results   Component Value Date    WBC 7.6 2024    WBC 7.2 2024    HGB 11.0 (L) 2024    HGB 11.3 (L) 2024    HCT 36.2 2024    HCT 34.4 (L) 2024     2024      03/21/2024     BMP:   Lab Results   Component Value Date     05/03/2024     07/07/2023    POTASSIUM 4.4 05/03/2024    POTASSIUM 4.3 07/07/2023    CHLORIDE 105 05/03/2024    CHLORIDE 103 07/07/2023    CO2 21 (L) 05/03/2024    CO2 22 07/07/2023    BUN 6.3 05/03/2024    BUN 10.7 07/07/2023    CR 0.61 05/03/2024    CR 0.64 10/27/2023    GLC 90 05/03/2024    GLC 98 07/07/2023     COAGS:   Lab Results   Component Value Date    INR 1.01 07/07/2023     POC:   Lab Results   Component Value Date    HCG Negative 11/02/2021    HCGS Negative 11/02/2021     HEPATIC:   Lab Results   Component Value Date    ALBUMIN 3.6 05/03/2024    PROTTOTAL 6.4 05/03/2024    ALT 9 05/03/2024    AST 11 05/03/2024    ALKPHOS 117 05/03/2024    BILITOTAL 0.2 05/03/2024     OTHER:   Lab Results   Component Value Date    LACT 0.8 04/12/2023    A1C 5.1 10/27/2023    PEDRITO 8.9 05/03/2024    LIPASE 17 01/16/2023    TSH 2.20 05/03/2024    T4 0.96 05/03/2024    CRP <2.9 02/10/2015    SED 7 02/10/2015       Anesthesia Plan    ASA Status:  3       Anesthesia Type: Epidural.              Consents    Anesthesia Plan(s) and associated risks, benefits, and realistic alternatives discussed. Questions answered and patient/representative(s) expressed understanding.     - Discussed:     - Discussed with:  Patient            Postoperative Care            Comments:    Other Comments: Patient requests labor epidural.  Chart reviewed, including labs.  I reviewed the options and risks with the patient, including but not limited to: bleeding, infection, damage to tissues under the skin (nerves, muscles, blood vessels), hypotension, headache, and epidural failure.  The patient's questions were answered and the consent was signed. The patient agrees to elective epidural placement.           neg OB ROS.      Wisam Tang MD    I have reviewed the pertinent notes and labs in the chart from the past 30 days and (re)examined the patient.  Any updates or changes  from those notes are reflected in this note.

## 2024-06-02 NOTE — L&D DELIVERY NOTE
OB Vaginal Delivery Note    Mariano Cody MRN# [id number]   Age: 23 year old YOB: 2000       GA: 39w2d  GP:   Labor Complications: None   EBL:   mL  Delivery QBL: 300 mL  Delivery Type: Vaginal, Spontaneous   ROM to Delivery Time: (Delivered) Hours: 1 Minutes: 40   Weight:     1 Minute 5 Minute 10 Minute   Apgar Totals: 9   9        SIM ORNELAS;MARIANO CARIAS     Delivery Details:  Mariano Cody, a 23 year old  female delivered a viable infant with apgars of 9  and 9 . Patient was fully dilated and pushing after 1  hours 15  minutes in active labor. Delivery was via vaginal, spontaneous  to a sterile field under epidural  anesthesia. Infant delivered in vertex  left  occiput  anterior  position. Anterior and posterior shoulders delivered without difficulty. The cord was clamped, cut twice and 3 vessels  were noted. Cord blood was obtained in routine fashion with the following disposition: lab .      Cord complications: none   Placenta delivered at 2024 11:32 AM . Placental disposition was Hospital disposal . Fundal massage performed and fundus found to be firm.     Episiotomy: none    Perineum, vagina, cervix were inspected, and the following lacerations were noted:   Perineal lacerations: none   periurethral laceration: right             Excellent hemostasis was noted no repair needed. Needle count correct. Infant and patient in delivery room in good and stable condition.        Marvin Cody-Mariano [[id number]]      Labor Event Times      Active labor onset date: 24 Onset time:  9:00 AM   Dilation complete date: 24 Complete time: 11:20 AM   Start pushing date/time: 2024 1126          Labor Events     labor?: No   steroids: None       Rupture date/time: 24 0947   Rupture type: Artificial Rupture of Membranes  Fluid color: Clear  Fluid odor: Normal     Induction: Oxytocin  Induction date/time:      Cervical ripening  date/time:      Indications for induction: Elective, Obesity     Augmentation: AROM  Indications for augmentation: Ineffective Contraction Pattern       Delivery/Placenta Date and Time      Delivery Date: 24 Delivery Time: 11:27 AM   Placenta Date/Time: 2024 11:32 AM  Oxytocin given at the time of delivery: after delivery of baby  Delivering clinician: Evelyn Maldonado CNM   Other personnel present at delivery:  Provider Role   Rhiannon Kulkarni, RN Delivery Nurse   Samantha Fang, RN Delivery Nurse             Vaginal Counts       Initial count performed by 2 team members:  Two Team Members   RADHA Arteaga CNM         Hurley Suture Needles Sponges (RETIRED) Instruments   Initial counts 0 0 0    Added to count       Relief counts       Final counts 0 0 0            Placed during labor Accounted for at the end of labor   FSE No NA   IUPC No NA   Cervidil No NA                  Final count performed by 2 team members:  Two Team Members   RADHA Arteaga CNM      Final count correct?: Yes  Pre-Birth Team Brief: Complete  Post-Birth Team Debrief: Complete       Apgars    Living status: Living   1 Minute 5 Minute 10 Minute 15 Minute 20 Minute   Skin color: 1  1       Heart rate: 2  2       Reflex irritability: 2  2       Muscle tone: 2  2       Respiratory effort: 2  2       Total: 9  9       Apgars assigned by: RADHA ARTEAGA       Cord      Vessels: 3 Vessels    Cord Complications: None               Cord Blood Disposition: Lab    Gases Sent?: No    Delayed cord clamping?: Yes    Cord Clamping Delay (seconds):  seconds    Stem cell collection?: No            Resuscitation    Methods: None       Skin to Skin and Feeding Plan      Skin to skin initiation date/time: 1841    Skin to skin with: Mother  Skin to skin end date/time:            Labor Events and Shoulder Dystocia    Fetal Tracing Prior to Delivery: Category 1  Shoulder dystocia present?: Neg       Delivery (Maternal) (Provider to Complete)  ([id number])    Episiotomy: None  Perineal lacerations: None      Periurethral laceration: right Repaired?: No   Repair suture: None  Genital tract inspection done: Pos       Blood Loss  Mother: Samantha Cody #[id number]     Start of Mother's Information      Delivery Blood Loss  06/02/24 0900 - 06/02/24 1154      Delivery QBL (mL) Hospital Encounter 300 mL    Total  300 mL               End of Mother's Information  Mother: Samantha Cody #[id number]                Delivery - Provider to Complete ([id number])    Delivering clinician: Evelyn Maldonado CNM  Delivery Type (Choose the 1 that will go to the Birth History): Vaginal, Spontaneous                         Other personnel:  Provider Role   Rhiannon Kulkarni, RN Delivery Nurse   Samantha Fang, RN Delivery Nurse                    Placenta    Date/Time: 6/2/2024 11:32 AM  Removal: Spontaneous  Disposition: Hospital disposal             Anesthesia    Method: Epidural                    Presentation and Position    Presentation: Vertex    Position: Left Occiput Anterior                   Evelyn Maldonado DNP,APRN,SURESH

## 2024-06-02 NOTE — PLAN OF CARE
Problem: Adult Inpatient Plan of Care  Goal: Plan of Care Review  Description: The Plan of Care Review/Shift note should be completed every shift.  The Outcome Evaluation is a brief statement about your assessment that the patient is improving, declining, or no change.  This information will be displayed automatically on your shift  note.  Outcome: Progressing     Problem: Labor  Goal: Acceptable Pain Control  Outcome: Progressing     Problem: Labor  Goal: Effective Progression to Delivery  Outcome: Progressing   Goal Outcome Evaluation:

## 2024-06-02 NOTE — PLAN OF CARE
Goal Outcome Evaluation:           Problem: Labor  Goal: Effective Progression to Delivery  Outcome: Progressing  Goal: Normal Uterine Contraction Pattern  Outcome: Progressing       Pt given 10th dose of cytotec. VS and assessments WNL. Pt is resting comfortably.

## 2024-06-02 NOTE — PROGRESS NOTES
Labor Progress Note:    Patient Name:  Samantha Cody  :      2000  MRN:      [id number]    Assessment:    , at 39w 2d   Early labor  IOL with Pitocin in progress  Pt requesting epidural now for pain management  AROM clear fluid  GBS negative  Morbid obesity  Anemia  Hx of Pyelo  hypothyroidism    Plan:   -AROM  -continue to titrate Pitocin to achieve active labor  -start IV bolus in prep for epidural  -Epidural per MDA now  -Routine CNM support & management. Encourage position changes, ambulation, rest as desired.  -Anticipate progress and NSVB.   -Reevaluate progress in 2-3 hours or sooner with a change in status.    Subjective:  Samantha Cody is coping well with contractions. Feeling more pressure and states she is uncomfortable with contractions and would like epidural now. Good PO fluid intake.   Voiding without issue.     Objective:  /82 (BP Location: Left arm, Patient Position: Semi-Spain's, Cuff Size: Adult Regular)   Pulse 72   Temp 97.7  F (36.5  C) (Oral)   Resp 16   LMP 2023 (Exact Date)   SpO2 98%   FHR: category I tracing  Uterine contractions: Q2-2.5 min  Pitocin at 8 mu/min  SVE: 3cm/75%/-1  AROM clear fluid      TT with patient 15mn >50% time spent in counseling    Provider:  Evelyn Maldonado DNP,APRCLEO,CNM    Date:  2024  Time:  9:58 AM

## 2024-06-02 NOTE — PROGRESS NOTES
Labor Progress Note:    Patient Name:  Samantha Cody  :      2000  MRN:      [id number]    Assessment:    , at 39w 2d   Induction of labor  Cervical ripening complete  Morbid obesity BMI 44.3 (current)  History of pyelonephritis   Anemia in pregnancy (Hgb 11.0 24)  GBS negative  Blood type O positive  Hypothyroidism (TSH 2.20 5/3/24)    Plan:   -Start IV Pitocin per protocol this morning  -Routine CNM support & management. Encourage position changes, ambulation, rest as desired.  -Pt desires epidural for pain management when appropriate  -Anticipate progress and NSVB.   -Reevaluate progress in 2-3 hours or sooner with a change in status.    Subjective:  Samantha Cody is coping well with cramping. Was able to sleep through most of it after getting extra cushion placed on bed. Has [employer] monitoring system in place. Feeling more discomfort with cramping when awake. Good PO fluid intake.   Voiding without issue. FOB and family at bedside for support.    Objective:  /75 (BP Location: Left arm, Patient Position: Semi-Spain's, Cuff Size: Adult Regular)   Pulse 72   Temp 98.9  F (37.2  C) (Oral)   Resp 16   LMP 2023 (Exact Date)   SpO2 98%   FHR: 125, LTV mod  Uterine contractions: Q2-5 min, mild  SVE: 1cm/50%/-2/mid/soft  Cardona score 6-7  Cervical ripening complete      TT with patient 15mn >50% time spent in counseling    Provider:  Evelyn Maldonado DNP,APRN,CNM    Date:  2024  Time:  6:54 AM

## 2024-06-02 NOTE — PROGRESS NOTES
Labor Progress Note:    Assessment: 23 year old  @ 39w1d here for IOL  - Fetal status: FHT Category 1 with moderate variability  -cervical ripening x7 doses cytotec    Plan:   - Plan to continue with cervical ripening unless change in labor status  -Routine CNM support & management. Encourage position changes, ambulation, rest as desired.    Subjective:  Samantha is coping well with contractions. Feeling some period-like cramps and requesting SVE as she doesn't;t want to miss window for epidural. PO fluid intake.  Voiding without issue.  Multiple family members and partner supportive at bedside.    Objective:  No data found.    Membrane Status: Intact    Fetal Heart Rate:  FHR:Baseline: 130 bpm, Variability: Moderate (6 - 25 bpm), Accelerations: present and Decelerations: Absent    Uterine contractions:TocoFrequency: Every 1.5-5 minutes, Duration: 40-60 seconds and Intensity: mild    SVE:FT/60/-2/mid/soft        RAFY Cabrera CNM        Total time spent on the date of this encounter doing: chart review, review of test results, patient visit, performing the physical exam, education, counseling, developing this plan of care, and documenting = 10 minutes.     2024 10:39 PM

## 2024-06-03 VITALS
BODY MASS INDEX: 43.85 KG/M2 | RESPIRATION RATE: 16 BRPM | SYSTOLIC BLOOD PRESSURE: 109 MMHG | HEART RATE: 74 BPM | WEIGHT: 280 LBS | DIASTOLIC BLOOD PRESSURE: 60 MMHG | OXYGEN SATURATION: 99 % | TEMPERATURE: 97.9 F

## 2024-06-03 LAB — HGB BLD-MCNC: 9.8 G/DL (ref 11.7–15.7)

## 2024-06-03 PROCEDURE — 36415 COLL VENOUS BLD VENIPUNCTURE: CPT | Performed by: MIDWIFE

## 2024-06-03 PROCEDURE — 250N000013 HC RX MED GY IP 250 OP 250 PS 637: Performed by: ADVANCED PRACTICE MIDWIFE

## 2024-06-03 PROCEDURE — 85018 HEMOGLOBIN: CPT | Performed by: MIDWIFE

## 2024-06-03 PROCEDURE — 250N000013 HC RX MED GY IP 250 OP 250 PS 637: Performed by: MIDWIFE

## 2024-06-03 PROCEDURE — 250N000011 HC RX IP 250 OP 636: Performed by: MIDWIFE

## 2024-06-03 RX ORDER — IBUPROFEN 600 MG/1
600 TABLET, FILM COATED ORAL EVERY 6 HOURS PRN
COMMUNITY
Start: 2024-06-03

## 2024-06-03 RX ADMIN — ENOXAPARIN SODIUM 40 MG: 100 INJECTION SUBCUTANEOUS at 00:54

## 2024-06-03 RX ADMIN — LEVOTHYROXINE SODIUM 50 MCG: 0.05 TABLET ORAL at 06:22

## 2024-06-03 RX ADMIN — DOCUSATE SODIUM 100 MG: 100 CAPSULE, LIQUID FILLED ORAL at 08:55

## 2024-06-03 RX ADMIN — ENOXAPARIN SODIUM 40 MG: 100 INJECTION SUBCUTANEOUS at 13:36

## 2024-06-03 ASSESSMENT — ACTIVITIES OF DAILY LIVING (ADL)
ADLS_ACUITY_SCORE: 18

## 2024-06-03 NOTE — PLAN OF CARE
Patient denied pain throughout assessment on this shift. Noted to have uterine cramping with breastfeeding, discussed normalcy of this and offered pain medication. Patient denied at that time. Patient reported no changes in flow of vaginal bleeding throughout shift. Fundus firm without massage. Up ad abi in room and to use restroom; voiding independently. Support person Prashant at bedside for the night. Breastfeeding infant on demand. Offered infant maternal EBM x1. VSS.     Problem: Postpartum (Vaginal Delivery)  Goal: Successful Parent Role Transition  Outcome: Progressing  Goal: Hemostasis  Outcome: Progressing  Goal: Absence of Infection Signs and Symptoms  Outcome: Progressing  Goal: Anesthesia/Sedation Recovery  Outcome: Progressing  Goal: Optimal Pain Control and Function  Outcome: Progressing  Intervention: Prevent or Manage Pain  Recent Flowsheet Documentation  Taken 6/3/2024 0510 by Dana Sanon, RN  Perineal Care:   absorbent brief/pad changed   perineum cleansed  Taken 6/2/2024 2110 by Dana Sanon, RN  Perineal Care:   absorbent brief/pad changed   perineum cleansed  Goal: Effective Urinary Elimination  Outcome: Progressing   Goal Outcome Evaluation:

## 2024-06-03 NOTE — PLAN OF CARE
Problem: Postpartum (Vaginal Delivery)  Goal: Optimal Pain Control and Function  Outcome: Met     Problem: Postpartum (Vaginal Delivery)  Goal: Absence of Infection Signs and Symptoms  Outcome: Met   Goal Outcome Evaluation    Pt VSS,assessment WNL. Questions answered and education reviewed.

## 2024-06-03 NOTE — DISCHARGE SUMMARY
CNM Postpartum Discharge Note    SIGNIFICANT PROBLEMS:  Patient Active Problem List    Diagnosis Date Noted     (normal spontaneous vaginal delivery) 2024     Priority: Medium    Encounter for planned induction of labor 2024     Priority: Medium    Anemia affecting pregnancy in third trimester 2024     Priority: Medium    At risk for complication of pregnancy 2023     Priority: Medium     PEC: ASA 81 mg p.o. daily      Short interval between pregnancies affecting pregnancy, antepartum 2023     Priority: Medium     At risk for antepartum anemia and  labor/ birth      History of pyelonephritis 2023     Priority: Medium     3 days postpartum after hospitalization for childbirth, indwelling catheter with epidural anesthesia.  Plan urine culture q. trimester (2024 and 4/10/2024)      Genetic screening 2023     Priority: Medium     NIPT: Negative for aneuploidy, girl  2023 single marker AFP:___      Vaping nicotine dependence, non-tobacco product 2023     Priority: Medium     2023 encouraged cessation      Supervision of high risk pregnancy in third trimester 10/11/2023     Priority: Medium     [facility] at 16 weeks  2 prenatal visits before transfer, initiated care at 8 weeks    Clinic/Hospital: [facility]  Partner name: Prashant  Predicted fetal sex:  Childrens names/ages: 1-year-old daughter  Previous labor experiences: Traumatized by being told to wait to push for 2 hours since the only physician to catch the baby was occupied.  3 days postpartum, developed puerperal fever and diagnosed with pyelonephritis.  Waterbirth (interest, declined, ineligible):  Date waterbirth patient education reviewed:  Level of education/occupation:   Pertinent history:  x 1; history of postpartum pyelonephritis after hospitalization for childbirth and indwelling catheter; pregravid BMI greater than 40; Short pregnancy  interval  PP contraception:  Hx PPD/depression/anxiety: Denies  Peds provider:    Plans for labor pain management: Epidural  Plans for post partum recovery/time off:  Feeding preference: Breast  Breast pump:   Car seat:  Circumcision:  Discussed 2 week visit:  Flu: Declines  COVID: Declines  Tdap: 3/21/2024  RSV:      ?          .          Acquired hypothyroidism 04/06/2023     Priority: Medium     10/27/2023: TSH 2.94  12/22/2023: Patient currently takes levothyroxine 50 mcg p.o. daily  12/22/2023: TSH:______ free T4:_________      BMI 43 06/09/2022     Priority: Medium     10/27/2023: 5.1% hemoglobin A1c    MFM antepartum protocol for BMI greater than 40:  -L2 OB US  -Third trimester fetal growth ultrasound  -Weekly fetal surveillance  -Consider IOL at 39 weeks      Multiple joint pain 02/18/2015     Priority: Medium         SUBJECTIVE:  Patient is stable and is tolerating acitivity well  Baby is rooming in  Complications since 2 hours post delivery: None  Pain is well controlled.  Patient is taking pain medications.  Breastfeeding status:initiated and well established   Elimination:  She is voiding without difficulty.  She has had a bowel movement  Desired contraception Condoms  Denies heavy bleeding and passing large clots.    INTERVAL HISTORY:  /60 (BP Location: Right arm, Patient Position: Semi-Spain's, Cuff Size: Adult Regular)   Pulse 74   Temp 97.9  F (36.6  C) (Oral)   Resp 16   LMP 09/01/2023 (Exact Date)   SpO2 99%   Breastfeeding Unknown     Constitutional: healthy, alert, and no distress    Breasts: Currently breastfeeding with a good latch and confidence    Fundus: Uterine fundus is firm, non-tender and at 2 below the level of the umbilicus    Perineum: Perineum is intact, minimal swelling    Lochia: Lochia is appropriate for the duration of time since delivery.     Postpartum hemoglobin   Hemoglobin   Date Value Ref Range Status   06/03/2024 9.8 (L) 11.7 - 15.7 g/dL Final   02/10/2015  "12.1 11.7 - 15.7 g/dL Final     Blood type No results found for: \"ABO\"  No results found for: \"RH\"  Rubella status No results found for: \"RUBELLAABIGG\"  History of depression:  no    ASSESSMENT/PLAN:  Normal postpartum course  Stable Post-partum day #1  Complications:none  Postpartum warning s/s reviewed, including bleeding/clots, fever, mastitis & thromboemboli   Exercise, diet and rest reviewed  PP Kemars/leena reviewed  Continue prenatal vitamins while breastfeeding  Birthcontrol planned:Condoms  Educated on postpartum blues and postpartum depression warnings signs/symptoms  2 week phone call follow-up to be done by delivering CNM  Follow-up in 6 weeks with CNMs at [facility]  Plan d/c home today    Current Discharge Medication List        START taking these medications    Details   ibuprofen (ADVIL/MOTRIN) 600 MG tablet Take 1 tablet (600 mg) by mouth every 6 hours as needed for moderate pain    Associated Diagnoses: Postpartum care and examination of lactating mother           CONTINUE these medications which have NOT CHANGED    Details   acetaminophen (TYLENOL) 500 MG tablet Take 500 mg by mouth every 6 hours as needed for mild pain      ascorbic acid (VITAMIN C) 250 MG CHEW chewable tablet Take 1 tablet (250 mg) by mouth daily Take with iron supplement.  Qty: 90 tablet, Refills: 1    Associated Diagnoses: Anemia, unspecified type      cyanocobalamin (VITAMIN B-12) 1000 MCG tablet Take 1,000 mcg by mouth daily      ferrous sulfate (FEROSUL) 325 (65 Fe) MG tablet Take 1 tablet (325 mg) by mouth daily (with breakfast)  Qty: 90 tablet, Refills: 1    Associated Diagnoses: Anemia, unspecified type      levothyroxine (SYNTHROID/LEVOTHROID) 50 MCG tablet Take 1 tablet (50 mcg) by mouth daily  Qty: 90 tablet, Refills: 0    Associated Diagnoses: Acquired hypothyroidism      Prenatal Vit-Fe Fumarate-FA (PRENATAL MULTIVITAMIN  PLUS IRON) 27-1 MG TABS Take 1 tablet by mouth daily           STOP taking " these medications       aspirin (ASA) 81 MG chewable tablet Comments:   Reason for Stopping:         ondansetron (ZOFRAN ODT) 4 MG ODT tab Comments:   Reason for Stopping:               RAFY Ambrose CNM

## 2024-06-03 NOTE — ANESTHESIA POSTPROCEDURE EVALUATION
Patient: Samantha Cody    Procedure: * No procedures listed *       Anesthesia Type:  Epidural    Note:  Disposition: Inpatient   Postop Pain Control: Uneventful            Sign Out: Well controlled pain   PONV: No   Neuro/Psych: Uneventful            Sign Out: Acceptable/Baseline neuro status   Airway/Respiratory: Uneventful            Sign Out: Acceptable/Baseline resp. status   CV/Hemodynamics: Uneventful            Sign Out: Acceptable CV status; No obvious hypovolemia; No obvious fluid overload   Other NRE: NONE   DID A NON-ROUTINE EVENT OCCUR?            Last vitals:  Vitals:    06/02/24 2110 06/03/24 0055 06/03/24 0510   BP: 124/73 110/55 95/51   Pulse: 77 87 72   Resp: 16 16 18   Temp: 36.9  C (98.4  F) 36.7  C (98.1  F) 37.2  C (98.9  F)   SpO2:          Electronically Signed By: Nabil Briceno MD  Rossana 3, 2024  5:58 AM

## 2024-06-05 ENCOUNTER — PATIENT OUTREACH (OUTPATIENT)
Dept: CARE COORDINATION | Facility: CLINIC | Age: 24
End: 2024-06-05
Payer: COMMERCIAL

## 2024-06-05 NOTE — PROGRESS NOTES
[facility]:   [facility] Contact  [facility]/Voicemail     Clinical Data: Post-Discharge Outreach     Outreach attempted x 2.  Left message on patient's voicemail, providing [facility]'s central phone number of [phone] ([phone]) for questions/concerns and/or to schedule an appt with an [facility] provider, if they do not have a PCP.      Plan:  [facility] will do no further outreaches at this time.       Sarah Mcneal  Community Health Worker  [facility], [facility]  [phone]      *Connected Care Resource Team does NOT follow patient ongoing. Referrals are identified based on internal discharge reports and the outreach is to ensure patient has an understanding of their discharge instructions.

## 2024-06-28 ENCOUNTER — MYC REFILL (OUTPATIENT)
Dept: MIDWIFE SERVICES | Facility: CLINIC | Age: 24
End: 2024-06-28
Payer: COMMERCIAL

## 2024-06-28 DIAGNOSIS — E03.9 ACQUIRED HYPOTHYROIDISM: ICD-10-CM

## 2024-06-28 RX ORDER — LEVOTHYROXINE SODIUM 50 UG/1
50 TABLET ORAL DAILY
Qty: 90 TABLET | Refills: 0 | Status: SHIPPED | OUTPATIENT
Start: 2024-06-28

## 2024-06-28 NOTE — TELEPHONE ENCOUNTER
Refill request received from patient.  Medication requested:    Pending Prescriptions:                       Disp   Refills    levothyroxine (SYNTHROID/LEVOTHROID) 50 M*90 tab*0            Sig: Take 1 tablet (50 mcg) by mouth daily    Medication last prescribed: 3/28/24  Last visit with [facility] CNM group: 5/24/24  Upcoming appointment(s): None    Refill request routed to on-call CNM for review.

## 2024-07-15 ENCOUNTER — MEDICAL CORRESPONDENCE (OUTPATIENT)
Dept: HEALTH INFORMATION MANAGEMENT | Facility: CLINIC | Age: 24
End: 2024-07-15
Payer: COMMERCIAL

## 2024-08-02 ENCOUNTER — MEDICAL CORRESPONDENCE (OUTPATIENT)
Dept: HEALTH INFORMATION MANAGEMENT | Facility: CLINIC | Age: 24
End: 2024-08-02
Payer: COMMERCIAL

## 2024-08-04 ENCOUNTER — MYC REFILL (OUTPATIENT)
Dept: MIDWIFE SERVICES | Facility: CLINIC | Age: 24
End: 2024-08-04
Payer: COMMERCIAL

## 2024-08-04 DIAGNOSIS — E03.9 ACQUIRED HYPOTHYROIDISM: ICD-10-CM

## 2024-08-07 RX ORDER — LEVOTHYROXINE SODIUM 50 UG/1
50 TABLET ORAL DAILY
Qty: 90 TABLET | Refills: 0 | OUTPATIENT
Start: 2024-08-07

## 2024-08-15 ENCOUNTER — OFFICE VISIT (OUTPATIENT)
Dept: OBGYN | Facility: CLINIC | Age: 24
End: 2024-08-15
Payer: COMMERCIAL

## 2024-08-15 VITALS
TEMPERATURE: 98.4 F | WEIGHT: 290 LBS | DIASTOLIC BLOOD PRESSURE: 74 MMHG | BODY MASS INDEX: 45.52 KG/M2 | RESPIRATION RATE: 18 BRPM | HEART RATE: 77 BPM | SYSTOLIC BLOOD PRESSURE: 117 MMHG | HEIGHT: 67 IN

## 2024-08-15 DIAGNOSIS — Z30.011 ENCOUNTER FOR INITIAL PRESCRIPTION OF CONTRACEPTIVE PILLS: ICD-10-CM

## 2024-08-15 PROCEDURE — 99207 PR POST PARTUM EXAM: CPT | Performed by: PHYSICIAN ASSISTANT

## 2024-08-15 RX ORDER — ACETAMINOPHEN AND CODEINE PHOSPHATE 120; 12 MG/5ML; MG/5ML
0.35 SOLUTION ORAL DAILY
Qty: 84 TABLET | Refills: 3 | Status: SHIPPED | OUTPATIENT
Start: 2024-08-15

## 2024-08-15 ASSESSMENT — PATIENT HEALTH QUESTIONNAIRE - PHQ9
SUM OF ALL RESPONSES TO PHQ QUESTIONS 1-9: 2
10. IF YOU CHECKED OFF ANY PROBLEMS, HOW DIFFICULT HAVE THESE PROBLEMS MADE IT FOR YOU TO DO YOUR WORK, TAKE CARE OF THINGS AT HOME, OR GET ALONG WITH OTHER PEOPLE: NOT DIFFICULT AT ALL
SUM OF ALL RESPONSES TO PHQ QUESTIONS 1-9: 2

## 2024-08-15 ASSESSMENT — ANXIETY QUESTIONNAIRES
GAD7 TOTAL SCORE: 0
7. FEELING AFRAID AS IF SOMETHING AWFUL MIGHT HAPPEN: NOT AT ALL
7. FEELING AFRAID AS IF SOMETHING AWFUL MIGHT HAPPEN: NOT AT ALL
3. WORRYING TOO MUCH ABOUT DIFFERENT THINGS: NOT AT ALL
6. BECOMING EASILY ANNOYED OR IRRITABLE: NOT AT ALL
5. BEING SO RESTLESS THAT IT IS HARD TO SIT STILL: NOT AT ALL
IF YOU CHECKED OFF ANY PROBLEMS ON THIS QUESTIONNAIRE, HOW DIFFICULT HAVE THESE PROBLEMS MADE IT FOR YOU TO DO YOUR WORK, TAKE CARE OF THINGS AT HOME, OR GET ALONG WITH OTHER PEOPLE: NOT DIFFICULT AT ALL
GAD7 TOTAL SCORE: 0
GAD7 TOTAL SCORE: 0
2. NOT BEING ABLE TO STOP OR CONTROL WORRYING: NOT AT ALL
1. FEELING NERVOUS, ANXIOUS, OR ON EDGE: NOT AT ALL
8. IF YOU CHECKED OFF ANY PROBLEMS, HOW DIFFICULT HAVE THESE MADE IT FOR YOU TO DO YOUR WORK, TAKE CARE OF THINGS AT HOME, OR GET ALONG WITH OTHER PEOPLE?: NOT DIFFICULT AT ALL
4. TROUBLE RELAXING: NOT AT ALL

## 2024-08-15 NOTE — NURSING NOTE
"Initial /74 (BP Location: Right arm, Patient Position: Chair, Cuff Size: Adult Large)   Pulse 77   Temp 98.4  F (36.9  C) (Tympanic)   Resp 18   Ht 1.702 m (5' 7\")   Wt 131.5 kg (290 lb)   LMP 08/12/2024 (Exact Date)   Breastfeeding No   BMI 45.42 kg/m   Estimated body mass index is 45.42 kg/m  as calculated from the following:    Height as of this encounter: 1.702 m (5' 7\").    Weight as of this encounter: 131.5 kg (290 lb). .    "

## 2024-08-15 NOTE — PROGRESS NOTES
"[facility]    Post Partum Office Visit    CC: Post partum visit    HPI: Samantha Cody is a 23 year old  who presents for a 6 week post-partum visit.  Patient delivered on 24, by Evelyn Maldonado DNP, [facility] at 39w2d gestation.  Patient delivered via induced vaginal delivery, without laceration.  Complications During Labor: None     Information  Name: Vero  Sex: female  Weight: 7 lbs. 7 oz.  Complications: none  Feeding Method: formula     Maternal Information  Postpartum Depression: No  Resumed [place]: Yes  Last Pap Smear: 22 NILM  Birth Control Method: condoms and would like oral birth control prescription today.   Patient smokes 2 e-cigarettes per week, no HTN, no liver disease, no PE or DVT personally or family history of blood clots, no cardiac or stroke issues, no migraine with aura.   LMP started 24 (currently still on period)     ROS:  General/Constitutional:  Denies chills or fever  Respiratory: Denies shortness of breath, cough, wheezing   Cardiovascular: Denies chest pain, exertional pain, irregular heartbeat  Gastrointestinal:  Denies abdominal pain, constipation, nausea, or vomiting  Genitourinary: Denies hematuria, difficulty urinating, frequency, or dysuria   Skin: Denies dry skin, itching, rash, new skin lesions  Musculoskeletal: Denies aching muscles or joints, swelling in joints, back pain, shoulder pain, or painful feet, no peripheral edema  Psychiatric: Denies post partum depression    Physical Exam:   Vitals:    08/15/24 1328   BP: 117/74   BP Location: Right arm   Patient Position: Chair   Cuff Size: Adult Large   Pulse: 77   Resp: 18   Temp: 98.4  F (36.9  C)   TempSrc: Tympanic   Weight: 131.5 kg (290 lb)   Height: 1.702 m (5' 7\")      Estimated body mass index is 45.42 kg/m  as calculated from the following:    Height as of this encounter: 1.702 m (5' 7\").    Weight as of this encounter: 131.5 kg (290 lb).    General appearance: " well-hydrated, A&O x 3, no apparent distress  Lungs: Equal expansion bilaterally, no accessory muscle use  Heart: No heaves or thrills. No peripheral varicosities  Abdomen: Soft, non-tender, non-distended. No rebound, rigidity, or guarding.  Extremities: edema, no calf tenderness  Neuro: CN II-XII grossly intact  Genitourinary: no concerns: declined exam.   Breast: no concerns: declined exam      Assessment and Plan:     (Z39.2) Routine postpartum follow-up  (primary encounter diagnosis)  Comment: no restrictions.   Plan: return to annual exams    (Z30.011) Encounter for initial prescription of contraceptive pills  Comment: would like to start on oral progesterone only birth control.   Plan: norethindrone (MICRONOR) 0.35 MG tablet          Appropriate post partum recovery.    Plan for oral progesterone only birth control pill for contraception.     Plan for routine GYN cares, PAP smear due August 2025.       Jackie Schwarz PA-C    Answers submitted by the patient for this visit:  Patient Health Questionnaire (Submitted on 8/15/2024)  If you checked off any problems, how difficult have these problems made it for you to do your work, take care of things at home, or get along with other people?: Not difficult at all  PHQ9 TOTAL SCORE: 2  PADILLA-7 (Submitted on 8/15/2024)  PADILLA 7 TOTAL SCORE: 0

## 2024-10-01 ENCOUNTER — MEDICAL CORRESPONDENCE (OUTPATIENT)
Dept: HEALTH INFORMATION MANAGEMENT | Facility: CLINIC | Age: 24
End: 2024-10-01
Payer: COMMERCIAL

## 2024-10-20 ENCOUNTER — HOSPITAL ENCOUNTER (EMERGENCY)
Facility: CLINIC | Age: 24
Discharge: HOME | End: 2024-10-20
Attending: NURSE PRACTITIONER | Admitting: NURSE PRACTITIONER
Payer: COMMERCIAL

## 2024-10-20 ENCOUNTER — APPOINTMENT (OUTPATIENT)
Dept: CT IMAGING | Facility: CLINIC | Age: 24
End: 2024-10-20
Attending: NURSE PRACTITIONER
Payer: COMMERCIAL

## 2024-10-20 VITALS
SYSTOLIC BLOOD PRESSURE: 137 MMHG | OXYGEN SATURATION: 99 % | TEMPERATURE: 97.3 F | DIASTOLIC BLOOD PRESSURE: 89 MMHG | HEART RATE: 76 BPM | WEIGHT: 290 LBS | RESPIRATION RATE: 16 BRPM | BODY MASS INDEX: 45.52 KG/M2 | HEIGHT: 67 IN

## 2024-10-20 DIAGNOSIS — R10.13 EPIGASTRIC PAIN: ICD-10-CM

## 2024-10-20 LAB
ALBUMIN SERPL BCG-MCNC: 4.4 G/DL (ref 3.5–5.2)
ALP SERPL-CCNC: 100 U/L (ref 40–150)
ALT SERPL W P-5'-P-CCNC: 42 U/L (ref 0–50)
ANION GAP SERPL CALCULATED.3IONS-SCNC: 9 MMOL/L (ref 7–15)
AST SERPL W P-5'-P-CCNC: 34 U/L (ref 0–45)
BASOPHILS # BLD AUTO: 0 10E3/UL (ref 0–0.2)
BASOPHILS NFR BLD AUTO: 0 %
BILIRUB SERPL-MCNC: 0.3 MG/DL
BUN SERPL-MCNC: 8.8 MG/DL (ref 6–20)
CALCIUM SERPL-MCNC: 9.4 MG/DL (ref 8.8–10.4)
CHLORIDE SERPL-SCNC: 101 MMOL/L (ref 98–107)
CREAT SERPL-MCNC: 0.71 MG/DL (ref 0.51–0.95)
EGFRCR SERPLBLD CKD-EPI 2021: >90 ML/MIN/1.73M2
EOSINOPHIL # BLD AUTO: 0.1 10E3/UL (ref 0–0.7)
EOSINOPHIL NFR BLD AUTO: 2 %
ERYTHROCYTE [DISTWIDTH] IN BLOOD BY AUTOMATED COUNT: 13.1 % (ref 10–15)
GLUCOSE SERPL-MCNC: 96 MG/DL (ref 70–99)
HCG SERPL QL: NEGATIVE
HCO3 SERPL-SCNC: 27 MMOL/L (ref 22–29)
HCT VFR BLD AUTO: 38.5 % (ref 35–47)
HGB BLD-MCNC: 12.2 G/DL (ref 11.7–15.7)
IMM GRANULOCYTES # BLD: 0 10E3/UL
IMM GRANULOCYTES NFR BLD: 0 %
LIPASE SERPL-CCNC: 16 U/L (ref 13–60)
LYMPHOCYTES # BLD AUTO: 1.7 10E3/UL (ref 0.8–5.3)
LYMPHOCYTES NFR BLD AUTO: 25 %
MCH RBC QN AUTO: 26.3 PG (ref 26.5–33)
MCHC RBC AUTO-ENTMCNC: 31.7 G/DL (ref 31.5–36.5)
MCV RBC AUTO: 83 FL (ref 78–100)
MONOCYTES # BLD AUTO: 0.4 10E3/UL (ref 0–1.3)
MONOCYTES NFR BLD AUTO: 5 %
NEUTROPHILS # BLD AUTO: 4.6 10E3/UL (ref 1.6–8.3)
NEUTROPHILS NFR BLD AUTO: 67 %
NRBC # BLD AUTO: 0 10E3/UL
NRBC BLD AUTO-RTO: 0 /100
PLATELET # BLD AUTO: 207 10E3/UL (ref 150–450)
POTASSIUM SERPL-SCNC: 4.2 MMOL/L (ref 3.4–5.3)
PROT SERPL-MCNC: 7.3 G/DL (ref 6.4–8.3)
RBC # BLD AUTO: 4.64 10E6/UL (ref 3.8–5.2)
SODIUM SERPL-SCNC: 137 MMOL/L (ref 135–145)
WBC # BLD AUTO: 6.9 10E3/UL (ref 4–11)

## 2024-10-20 PROCEDURE — 83690 ASSAY OF LIPASE: CPT | Performed by: NURSE PRACTITIONER

## 2024-10-20 PROCEDURE — 36415 COLL VENOUS BLD VENIPUNCTURE: CPT | Performed by: NURSE PRACTITIONER

## 2024-10-20 PROCEDURE — 84703 CHORIONIC GONADOTROPIN ASSAY: CPT | Performed by: NURSE PRACTITIONER

## 2024-10-20 PROCEDURE — 80053 COMPREHEN METABOLIC PANEL: CPT | Performed by: NURSE PRACTITIONER

## 2024-10-20 PROCEDURE — 250N000011 HC RX IP 250 OP 636: Performed by: NURSE PRACTITIONER

## 2024-10-20 PROCEDURE — 96374 THER/PROPH/DIAG INJ IV PUSH: CPT | Mod: 59

## 2024-10-20 PROCEDURE — 250N000009 HC RX 250: Performed by: NURSE PRACTITIONER

## 2024-10-20 PROCEDURE — 96375 TX/PRO/DX INJ NEW DRUG ADDON: CPT

## 2024-10-20 PROCEDURE — 85025 COMPLETE CBC W/AUTO DIFF WBC: CPT | Performed by: NURSE PRACTITIONER

## 2024-10-20 PROCEDURE — 99285 EMERGENCY DEPT VISIT HI MDM: CPT | Mod: 25

## 2024-10-20 PROCEDURE — 74177 CT ABD & PELVIS W/CONTRAST: CPT

## 2024-10-20 RX ORDER — OMEPRAZOLE 40 MG/1
40 CAPSULE, DELAYED RELEASE ORAL DAILY
Qty: 14 CAPSULE | Refills: 0 | Status: SHIPPED | OUTPATIENT
Start: 2024-10-20 | End: 2024-11-03

## 2024-10-20 RX ORDER — ONDANSETRON 2 MG/ML
4 INJECTION INTRAMUSCULAR; INTRAVENOUS ONCE
Status: COMPLETED | OUTPATIENT
Start: 2024-10-20 | End: 2024-10-20

## 2024-10-20 RX ORDER — HYDROMORPHONE HYDROCHLORIDE 1 MG/ML
0.5 INJECTION, SOLUTION INTRAMUSCULAR; INTRAVENOUS; SUBCUTANEOUS ONCE
Status: DISCONTINUED | OUTPATIENT
Start: 2024-10-20 | End: 2024-10-21 | Stop reason: HOSPADM

## 2024-10-20 RX ORDER — IOPAMIDOL 755 MG/ML
143 INJECTION, SOLUTION INTRAVASCULAR ONCE
Status: COMPLETED | OUTPATIENT
Start: 2024-10-20 | End: 2024-10-20

## 2024-10-20 RX ADMIN — ONDANSETRON 4 MG: 2 INJECTION INTRAMUSCULAR; INTRAVENOUS at 20:30

## 2024-10-20 RX ADMIN — SODIUM CHLORIDE 76 ML: 9 INJECTION, SOLUTION INTRAVENOUS at 21:41

## 2024-10-20 RX ADMIN — IOPAMIDOL 143 ML: 755 INJECTION, SOLUTION INTRAVENOUS at 21:41

## 2024-10-20 RX ADMIN — PANTOPRAZOLE SODIUM 40 MG: 40 INJECTION, POWDER, FOR SOLUTION INTRAVENOUS at 20:30

## 2024-10-20 ASSESSMENT — ACTIVITIES OF DAILY LIVING (ADL)
ADLS_ACUITY_SCORE: 35

## 2024-10-20 ASSESSMENT — COLUMBIA-SUICIDE SEVERITY RATING SCALE - C-SSRS
1. IN THE PAST MONTH, HAVE YOU WISHED YOU WERE DEAD OR WISHED YOU COULD GO TO SLEEP AND NOT WAKE UP?: NO
2. HAVE YOU ACTUALLY HAD ANY THOUGHTS OF KILLING YOURSELF IN THE PAST MONTH?: NO
6. HAVE YOU EVER DONE ANYTHING, STARTED TO DO ANYTHING, OR PREPARED TO DO ANYTHING TO END YOUR LIFE?: NO

## 2024-10-21 NOTE — ED PROVIDER NOTES
History     Chief Complaint   Patient presents with    Abdominal Pain     Mid upper abd pain since Friday. Pain is constant. Ibuprofen helps for a short time otherwise nothing is helping/making it worse. Pain is described as cramping/pressure.      HPI  Samantha Cody is a 23 year old female who [facility] for evaluation of epigastric pain that started 2 days ago.  Pain has been constant.  Denies nausea or vomiting.  Denies constipation or diarrhea.  Denies fever or chills.  Pain does not radiate.  She tried taking ibuprofen which did help for a short time.  She does not take ibuprofen regularly.  She drank alcohol (3-4 drinks) at a wedding yesterday, but otherwise only drinks alcohol socially.  She has no prior history of abdominal surgeries.    She vapes nicotine.     Allergies:  No Known Allergies    Problem List:    Patient Active Problem List    Diagnosis Date Noted     (normal spontaneous vaginal delivery) 2024     Priority: Medium    Encounter for planned induction of labor 2024     Priority: Medium    Anemia affecting pregnancy in third trimester 2024     Priority: Medium    At risk for complication of pregnancy 2023     Priority: Medium     PEC: ASA 81 mg p.o. daily      Short interval between pregnancies affecting pregnancy, antepartum 2023     Priority: Medium     At risk for antepartum anemia and  labor/ birth      History of pyelonephritis 2023     Priority: Medium     3 days postpartum after hospitalization for childbirth, indwelling catheter with epidural anesthesia.  Plan urine culture q. trimester (2024 and 4/10/2024)      Genetic screening 2023     Priority: Medium     NIPT: Negative for aneuploidy, girl  2023 single marker AFP:___      Vaping nicotine dependence, non-tobacco product 2023     Priority: Medium     2023 encouraged cessation      Supervision of high risk pregnancy in third trimester 10/11/2023      Priority: Medium     ROLF from [facility] clinic at 16 weeks  2 prenatal visits before transfer, initiated care at 8 weeks    Clinic/Hospital: [facility]  Partner name: Prashant  Predicted fetal sex:  Childrens names/ages: 1-year-old daughter  Previous labor experiences: Traumatized by being told to wait to push for 2 hours since the only physician to catch the baby was occupied.  3 days postpartum, developed puerperal fever and diagnosed with pyelonephritis.  Waterbirth (interest, declined, ineligible):  Date waterbirth patient education reviewed:  Level of education/occupation:   Pertinent history:  x 1; history of postpartum pyelonephritis after hospitalization for childbirth and indwelling catheter; pregravid BMI greater than 40; Short pregnancy interval  PP contraception:  Hx PPD/depression/anxiety: Denies  Peds provider:    Plans for labor pain management: Epidural  Plans for post partum recovery/time off:  Feeding preference: Breast  Breast pump:   Car seat:  Circumcision:  Discussed 2 week visit:  Flu: Declines  COVID: Declines  Tdap: 3/21/2024  RSV:      ?          .          Acquired hypothyroidism 2023     Priority: Medium     10/27/2023: TSH 2.94  2023: Patient currently takes levothyroxine 50 mcg p.o. daily  2023: TSH:______ free T4:_________      BMI 43 2022     Priority: Medium     10/27/2023: 5.1% hemoglobin A1c    MFM antepartum protocol for BMI greater than 40:  -L2 OB US  -Third trimester fetal growth ultrasound  -Weekly fetal surveillance  -Consider IOL at 39 weeks      Multiple joint pain 2015     Priority: Medium        Past Medical History:    Past Medical History:   Diagnosis Date    VALENTINO (acute kidney injury) (H)     Anemia due to blood loss, acute     Hypothyroidism     Pyelonephritis     Sepsis (H)        Past Surgical History:    Past Surgical History:   Procedure Laterality Date    NO HISTORY OF SURGERY      WISDOM TOOTH EXTRACTION        "      Family History:    Family History   Problem Relation Age of Onset    Hypertension Mother     Hypertension Father     Thyroid Disease Paternal Grandmother     Gynecology Other         ovarian cancer age:25 -paternal aunt       Social History:  Marital Status:  Single [1]  Social History     Tobacco Use    Smoking status: Every Day     Types: Vaping Device     Passive exposure: Never    Smokeless tobacco: Never    Tobacco comments:     cutting down with pregnancy   Vaping Use    Vaping status: Every Day    Substances: Nicotine    Devices: Disposable   Substance Use Topics    Alcohol use: Not Currently     Comment: occas-quit with pregnancy    Drug use: No        Medications:    omeprazole (PRILOSEC) 40 MG DR capsule  acetaminophen (TYLENOL) 500 MG tablet  ascorbic acid (VITAMIN C) 250 MG CHEW chewable tablet  cyanocobalamin (VITAMIN B-12) 1000 MCG tablet  ferrous sulfate (FEROSUL) 325 (65 Fe) MG tablet  ibuprofen (ADVIL/MOTRIN) 600 MG tablet  levothyroxine (SYNTHROID/LEVOTHROID) 50 MCG tablet  norethindrone (MICRONOR) 0.35 MG tablet  Prenatal Vit-Fe Fumarate-FA (PRENATAL MULTIVITAMIN  PLUS IRON) 27-1 MG TABS          Review of Systems  As mentioned above in the history present illness. All other systems were reviewed and are negative.    Physical Exam   BP: (!) 155/113  Pulse: 73  Temp: 97.3  F (36.3  C)  Resp: 16  Height: 170.2 cm (5' 7\")  Weight: 131.5 kg (290 lb)  SpO2: 100 %      Physical Exam  Constitutional:       General: She is not in acute distress.     Appearance: She is well-developed. She is obese. She is not ill-appearing.   HENT:      Head: Normocephalic and atraumatic.      Right Ear: External ear normal.      Left Ear: External ear normal.      Nose: Nose normal.      Mouth/Throat:      Mouth: Mucous membranes are moist.   Eyes:      Conjunctiva/sclera: Conjunctivae normal.   Cardiovascular:      Rate and Rhythm: Normal rate and regular rhythm.      Heart sounds: Normal heart sounds. No murmur " heard.  Pulmonary:      Effort: Pulmonary effort is normal. No respiratory distress.      Breath sounds: Normal breath sounds.   Abdominal:      General: Bowel sounds are normal. There is no distension.      Palpations: Abdomen is soft.      Tenderness: There is abdominal tenderness in the epigastric area. There is no guarding. Negative signs include Chapin's sign and McBurney's sign.   Musculoskeletal:         General: Normal range of motion.   Skin:     General: Skin is warm and dry.      Findings: No rash.   Neurological:      General: No focal deficit present.      Mental Status: She is alert and oriented to person, place, and time.         ED Course        Procedures            Results for orders placed or performed during the hospital encounter of 10/20/24 (from the past 24 hour(s))   CBC with platelets differential    Narrative    The following orders were created for panel order CBC with platelets differential.  Procedure                               Abnormality         Status                     ---------                               -----------         ------                     CBC with platelets and d...[[id number]]  Abnormal            Final result                 Please view results for these tests on the individual orders.   Comprehensive metabolic panel   Result Value Ref Range    Sodium 137 135 - 145 mmol/L    Potassium 4.2 3.4 - 5.3 mmol/L    Carbon Dioxide (CO2) 27 22 - 29 mmol/L    Anion Gap 9 7 - 15 mmol/L    Urea Nitrogen 8.8 6.0 - 20.0 mg/dL    Creatinine 0.71 0.51 - 0.95 mg/dL    GFR Estimate >90 >60 mL/min/1.73m2    Calcium 9.4 8.8 - 10.4 mg/dL    Chloride 101 98 - 107 mmol/L    Glucose 96 70 - 99 mg/dL    Alkaline Phosphatase 100 40 - 150 U/L    AST 34 0 - 45 U/L    ALT 42 0 - 50 U/L    Protein Total 7.3 6.4 - 8.3 g/dL    Albumin 4.4 3.5 - 5.2 g/dL    Bilirubin Total 0.3 <=1.2 mg/dL   Lipase   Result Value Ref Range    Lipase 16 13 - 60 U/L   CBC with platelets and differential   Result  Value Ref Range    WBC Count 6.9 4.0 - 11.0 10e3/uL    RBC Count 4.64 3.80 - 5.20 10e6/uL    Hemoglobin 12.2 11.7 - 15.7 g/dL    Hematocrit 38.5 35.0 - 47.0 %    MCV 83 78 - 100 fL    MCH 26.3 (L) 26.5 - 33.0 pg    MCHC 31.7 31.5 - 36.5 g/dL    RDW 13.1 10.0 - 15.0 %    Platelet Count 207 150 - 450 10e3/uL    % Neutrophils 67 %    % Lymphocytes 25 %    % Monocytes 5 %    % Eosinophils 2 %    % Basophils 0 %    % Immature Granulocytes 0 %    NRBCs per 100 WBC 0 <1 /100    Absolute Neutrophils 4.6 1.6 - 8.3 10e3/uL    Absolute Lymphocytes 1.7 0.8 - 5.3 10e3/uL    Absolute Monocytes 0.4 0.0 - 1.3 10e3/uL    Absolute Eosinophils 0.1 0.0 - 0.7 10e3/uL    Absolute Basophils 0.0 0.0 - 0.2 10e3/uL    Absolute Immature Granulocytes 0.0 <=0.4 10e3/uL    Absolute NRBCs 0.0 10e3/uL   HCG qualitative pregnancy (blood)   Result Value Ref Range    hCG Serum Qualitative Negative Negative   CT Abdomen Pelvis w Contrast    Narrative    EXAM: CT ABDOMEN PELVIS W CONTRAST  LOCATION: [facility]  DATE: 10/20/2024    INDICATION: Epigastric abdominal pain.  COMPARISON: None.  TECHNIQUE: CT scan of the abdomen and pelvis was performed following injection of IV contrast. Multiplanar reformats were obtained. Dose reduction techniques were used.  CONTRAST: 143 mL Isovue 370    FINDINGS:   LOWER CHEST: Normal.    HEPATOBILIARY: Mild diffuse fatty infiltration of the liver. No inflammatory stranding about the gallbladder.    PANCREAS: Normal.    SPLEEN: Normal.    ADRENAL GLANDS: Normal.    KIDNEYS/BLADDER: Normal. No ureteral stones or hydronephrosis.    BOWEL: The stomach is decompressed. The small and large intestines are normal caliber. Normal appendix.    LYMPH NODES: Normal.    VASCULATURE: Normal.    PELVIC ORGANS: 3.6 cm left ovarian cyst.    MUSCULOSKELETAL: Normal.      Impression    IMPRESSION:   1.  A 3.6 cm left ovarian cyst is likely physiologic. No imaging follow-up is needed unless the patient has  persistent pelvic pain.  2.  Mild diffuse fatty infiltration of the liver.  3.  Otherwise, normal CT of the abdomen and pelvis. An etiology for the patient's epigastric pain is not identified.         Medications   HYDROmorphone (PF) (DILAUDID) injection 0.5 mg (0 mg Intravenous Hold 10/20/24 2117)   ondansetron (ZOFRAN) injection 4 mg (4 mg Intravenous $Given 10/20/24 2030)   pantoprazole (PROTONIX) IV push injection 40 mg (40 mg Intravenous $Given 10/20/24 2030)   iopamidol (ISOVUE-370) solution 143 mL (143 mLs Intravenous $Given 10/20/24 2141)   sodium chloride 0.9 % bag 500mL for CT scan flush use (76 mLs Intravenous $Given 10/20/24 2141)       Assessments & Plan (with Medical Decision Making)     23 year old female who presents with acute epigastric abdominal pain likely secondary to gastritis/GERD.  Plan to send patient home with omeprazole and needs close follow up.   Abdominal exam without peritoneal signs. No evidence of acute abdomen at this time. Well appearing.   Given work up have low suspicion for acute cholecystitis or cholangitis, upper GI bleed, acute pancreatitis, or gastric perforation.  I have low suspicion for acute infectious processes, atypical appendicitis, vascular catastrophe, bowel obstruction or viscus perforation, or acute coronary syndrome.   Presentation not consistent with other acute, emergent causes of abdominal pain at this time.     Plan:  Your upper abdominal pain is related to gastritis which is inflammation of the lining of the stomach. See handout.  Omeprazole 40 mg daily for 2 weeks.  Make appointment for follow-up in clinic if symptoms are not improving in a couple of days or if symptoms return after you are off the omeprazole.  At that point, you may need to be set up for upper GI endoscopy for further evaluation.  Avoid NSAIDs (ibuprofen or naproxen).  Avoid spicy or acidic foods.  Avoid alcohol.  I recommend you stop vaping.  Incidental finding of a left simple ovarian  cyst.  This is likely physiologic and of no significance.  You can follow-up with your PCP or OB/GYN as needed.  You should be seen sooner for this if you develop any pelvic pain.    Return to the emergency department for fevers, worsening abdominal pain, vomiting and unable to keep fluids down, black or bloody stool, or any other symptoms of concern.      Discharge Medication List as of 10/20/2024 10:09 PM        START taking these medications    Details   omeprazole (PRILOSEC) 40 MG DR capsule Take 1 capsule (40 mg) by mouth daily for 14 days., Disp-14 capsule, R-0, E-Prescribe             Final diagnoses:   Epigastric pain       10/20/2024   [facility] EMERGENCY DEPT       Viky, RAFY Tavarez CNP  10/20/24 9018

## 2024-10-21 NOTE — DISCHARGE INSTRUCTIONS
Your upper abdominal pain is related to gastritis which is inflammation of the lining of the stomach. See handout.  Omeprazole 40 mg daily for 2 weeks.  Make appointment for follow-up in clinic if symptoms are not improving in a couple of days or if symptoms return after you are off the omeprazole.  At that point, you may need to be set up for upper GI endoscopy for further evaluation.  Avoid NSAIDs (ibuprofen or naproxen).  Avoid spicy or acidic foods.  Avoid alcohol.  I recommend you stop vaping.  Incidental finding of a left simple ovarian cyst.  This is likely physiologic and of no significance.  You can follow-up with your PCP or OB/GYN as needed.  You should be seen sooner for this if you develop any pelvic pain.    Return to the emergency department for fevers, worsening abdominal pain, vomiting and unable to keep fluids down, black or bloody stool, or any other symptoms of concern.

## 2024-10-21 NOTE — ED TRIAGE NOTES
Mid upper abd pain since Friday. Pain is constant. Ibuprofen helps for a short time otherwise nothing is helping/making it worse. Pain is described as cramping/pressure.      Triage Assessment (Adult)       Row Name 10/20/24 1933          Triage Assessment    Airway WDL WDL        Respiratory WDL    Respiratory WDL WDL        Skin Circulation/Temperature WDL    Skin Circulation/Temperature WDL WDL        Cardiac WDL    Cardiac WDL WDL        Peripheral/Neurovascular WDL    Peripheral Neurovascular WDL WDL        Cognitive/Neuro/Behavioral WDL    Cognitive/Neuro/Behavioral WDL WDL

## 2024-11-09 ENCOUNTER — HEALTH MAINTENANCE LETTER (OUTPATIENT)
Age: 24
End: 2024-11-09

## 2025-02-02 ENCOUNTER — MYC REFILL (OUTPATIENT)
Dept: MIDWIFE SERVICES | Facility: CLINIC | Age: 25
End: 2025-02-02
Payer: COMMERCIAL

## 2025-02-02 DIAGNOSIS — E03.9 ACQUIRED HYPOTHYROIDISM: ICD-10-CM

## 2025-02-03 RX ORDER — LEVOTHYROXINE SODIUM 50 UG/1
50 TABLET ORAL DAILY
Qty: 90 TABLET | Refills: 0 | Status: SHIPPED | OUTPATIENT
Start: 2025-02-03